# Patient Record
Sex: MALE | Race: WHITE | NOT HISPANIC OR LATINO | Employment: OTHER | URBAN - METROPOLITAN AREA
[De-identification: names, ages, dates, MRNs, and addresses within clinical notes are randomized per-mention and may not be internally consistent; named-entity substitution may affect disease eponyms.]

---

## 2017-10-18 ENCOUNTER — OFFICE VISIT (OUTPATIENT)
Dept: ORTHOPEDICS | Facility: CLINIC | Age: 67
End: 2017-10-18
Payer: COMMERCIAL

## 2017-10-18 ENCOUNTER — RADIANT APPOINTMENT (OUTPATIENT)
Dept: GENERAL RADIOLOGY | Facility: CLINIC | Age: 67
End: 2017-10-18
Attending: ORTHOPAEDIC SURGERY
Payer: COMMERCIAL

## 2017-10-18 VITALS — WEIGHT: 226.3 LBS | HEART RATE: 72 BPM | HEIGHT: 71 IN | BODY MASS INDEX: 31.68 KG/M2

## 2017-10-18 DIAGNOSIS — M25.511 CHRONIC RIGHT SHOULDER PAIN: Primary | ICD-10-CM

## 2017-10-18 DIAGNOSIS — M25.511 RIGHT SHOULDER PAIN: ICD-10-CM

## 2017-10-18 DIAGNOSIS — G89.29 CHRONIC RIGHT SHOULDER PAIN: Primary | ICD-10-CM

## 2017-10-18 PROCEDURE — 99203 OFFICE O/P NEW LOW 30 MIN: CPT | Performed by: ORTHOPAEDIC SURGERY

## 2017-10-18 PROCEDURE — 73030 X-RAY EXAM OF SHOULDER: CPT | Mod: TC

## 2017-10-18 NOTE — LETTER
10/18/2017         RE: Miguelangel Graham  [address] [facility] [facility] [address]        Dear Colleague,    Thank you for referring your patient, Miguelangel Graham, to the [facility]. Please see a copy of my visit note below.    ORTHOPEDIC CONSULT      Chief Complaint: Miguelangel Graham is a 67 year old male who is being seen for Chief Complaint   Patient presents with     Shoulder Pain     right shoulder pain-hx of rotorcuff repair 2000     Consult       History of Present Illness:   History of an open right shoulder rotator cuff repair in 2000.    Since April as had pain in his shoulder associated with raising his arm up. Occasionally will have pain radiating down the arm. He also has pain and spasm along the rhomboid area. No recent traumas or injury. Rates his pain as moderate. Describes it as achy. He has attempted Advil, rest, and activity modification.        Patient's past medical, surgical, social and family histories reviewed.     No past medical history on file.    Past Surgical History:   Procedure Laterality Date     HC KNEE SCOPE, DIAGNOSTIC      Arthroscopy, Knee     HC REPAIR ROTATOR CUFF,CHRONIC         Medications:    Current Outpatient Prescriptions on File Prior to Visit:  NO ACTIVE MEDICATIONS .     No current facility-administered medications on file prior to visit.     Allergies   Allergen Reactions     No Known Drug Allergies        Social History     Occupational History           Social History Main Topics     Smoking status: Former Smoker     Smokeless tobacco: Not on file     Alcohol use Yes     Drug use: No     Sexual activity: Not on file       Family History   Problem Relation Age of Onset     C.A.D. Brother      CAsherARADHA. Brother      CJERROD Brother      CAsherAROMI Brother      KELLY. Brother      DIABETES Sister      DIABETES Sister      DIABETES Brother      DIABETES Brother      DIABETES Brother        REVIEW OF SYSTEMS  10 point review systems performed otherwise  "negative as noted as per history of present illness.    Physical Exam:  Vitals: Pulse 72  Ht 5' 10.5\" (1.791 m)  Wt 226 lb 4.8 oz (102.6 kg)  BMI 32.01 kg/m2  BMI= Body mass index is 32.01 kg/(m^2).  Constitutional: healthy, alert and no acute distress   Psychiatric: mentation appears normal and affect normal/bright  NEURO: no focal deficits  RESP: Normal with easy respirations and no use of accessory muscles to breathe, no audible wheezing or retractions  CV: RUE:  no edema         Regular rate and rhythm by palpation  SKIN: No erythema, rashes, excoriation, or breakdown. No evidence of infection. , Previous well healed incisions/laceration: Anterior incision along the shoulder well-healed  JOINT/EXTREMITIES:right shoulder: Full range of motion. He has no focal areas of tenderness anteriorly. He has some tenderness and spasm along the rhomboid area. He has some minimal weakness with supraspinatus and infraspinatus testing. Negative [place]'s. Negative impingement findings.     GAIT: not tested     Diagnostic Modalities:  right shoulder X-ray: No fractures or dislocations. Type II acromion. Appears to have some postsurgical changes along the a.c. joint and proximal humeral head.  Independent visualization of the images was performed.      Impression: right shoulder pain and weakness with previous rotator cuff repair in 2000    Plan:  All of the above pertinent physical exam and imaging modalities findings was reviewed with Miguelangel.    Given his exam and previous history of recommend MRI for evaluation of rotator cuff tendon.      Return to clinic to discuss test results, or sooner as needed for changes.  Re-x-ray on return: No    Bhupendra Jones D.O.    Again, thank you for allowing me to participate in the care of your patient.        Sincerely,        Jose Jones, DO    "

## 2017-10-18 NOTE — MR AVS SNAPSHOT
"              After Visit Summary   10/18/2017    Miguelangel Graham    MRN: [id number]           Patient Information     Date Of Birth          1950        Visit Information        Provider Department      10/18/2017 2:10 PM Jose Jones DO [facility]        Today's Diagnoses     Right shoulder pain    -  1       Follow-ups after your visit        Your next 10 appointments already scheduled     Oct 18, 2017  2:10 PM CDT   [facility]    [address] [place] [id number] [phone]              Who to contact     If you have questions or need follow up information about today's clinic visit or your schedule please contact [facility] directly at [phone].  Normal or non-critical lab and imaging results will be communicated to you by MyChart, letter or phone within 4 business days after the clinic has received the results. If you do not hear from us within 7 days, please contact the clinic through Lovejuicehart or phone. If you have a critical or abnormal lab result, we will notify you by phone as soon as possible.  Submit refill requests through Spotbros or call your pharmacy and they will forward the refill request to us. Please allow 3 business days for your refill to be completed.          Additional Information About Your Visit        Lovejuicehart Information     Spotbros lets you send messages to your doctor, view your test results, renew your prescriptions, schedule appointments and more. To sign up, go to [email or web address] . Click on \"Log in\" on the left side of the screen, which will take you to the Welcome page. Then click on \"Sign up Now\" on the right side of the page.     You will be asked to enter the access code listed below, as well as some personal information. Please follow the directions to create your username and password.     Your access code is: " "[email or web address]  Expires: 2018  1:44 PM     Your access code will  in 90 days. If you need help or a new code, please call your [facility] clinic or [phone].        Care EveryWhere ID     This is your Care EveryWhere ID. This could be used by other organizations to access your [facility] medical records  [id number]        Your Vitals Were     Pulse Height BMI (Body Mass Index)             72 1.791 m (5' 10.5\") 32.01 kg/m2          Blood Pressure from Last 3 Encounters:   13 122/78   04/30/10 122/78   09 122/78    Weight from Last 3 Encounters:   10/18/17 102.6 kg (226 lb 4.8 oz)   13 105.7 kg (233 lb)   04/30/10 105.7 kg (233 lb)               Primary Care Provider Office Phone # Fax #    John Doshi [phone]       XXX RESIGNED  [facility] [address] [place] [address]        Equal Access to Services     [facility] HospitalSTACEY : Hadii concha saenz hadadalberto Somaddie, waaxda luqadaha, qaybta kaalmaximo deal, sherwin varghese. So [facility] [phone].    ATENCIÓN: Si habla español, tiene a alicea disposición servicios gratuitos de asistencia lingüística. [facility] [phone].    We comply with applicable federal civil rights laws and [place] laws. We do not discriminate on the basis of race, color, national origin, age, disability, sex, sexual orientation, or gender identity.            Thank you!     Thank you for choosing [facility]  for your care. Our goal is always to provide you with excellent care. Hearing back from our patients is one way we can continue to improve our services. Please take a few minutes to complete the written survey that you may receive in the mail after your visit with us. Thank you!             Your Updated Medication List - Protect others around you: Learn how to safely use, store and throw away your medicines at www.disposemymeds.org.          This list is accurate as of: 10/18/17  1:58 PM.  Always use " your most recent med list.                   Brand Name Dispense Instructions for use Diagnosis    NO ACTIVE MEDICATIONS      .    Disorders of bursae and tendons in shoulder region, unspecified

## 2017-10-18 NOTE — PROGRESS NOTES
"ORTHOPEDIC CONSULT      Chief Complaint: Miguelangel Graham is a 67 year old male who is being seen for Chief Complaint   Patient presents with     Shoulder Pain     right shoulder pain-hx of rotorcuff repair 2000     Consult       History of Present Illness:   History of an open right shoulder rotator cuff repair in 2000.    Since April as had pain in his shoulder associated with raising his arm up. Occasionally will have pain radiating down the arm. He also has pain and spasm along the rhomboid area. No recent traumas or injury. Rates his pain as moderate. Describes it as achy. He has attempted Advil, rest, and activity modification.        Patient's past medical, surgical, social and family histories reviewed.     No past medical history on file.    Past Surgical History:   Procedure Laterality Date     HC KNEE SCOPE, DIAGNOSTIC      Arthroscopy, Knee     HC REPAIR ROTATOR CUFF,CHRONIC         Medications:    Current Outpatient Prescriptions on File Prior to Visit:  NO ACTIVE MEDICATIONS .     No current facility-administered medications on file prior to visit.     Allergies   Allergen Reactions     No Known Drug Allergies        Social History     Occupational History           Social History Main Topics     Smoking status: Former Smoker     Smokeless tobacco: Not on file     Alcohol use Yes     Drug use: No     Sexual activity: Not on file       Family History   Problem Relation Age of Onset     C.A.D. Brother      C.A.D. Brother      C.A.D. Brother      C.A.D. Brother      C.A.D. Brother      DIABETES Sister      DIABETES Sister      DIABETES Brother      DIABETES Brother      DIABETES Brother        REVIEW OF SYSTEMS  10 point review systems performed otherwise negative as noted as per history of present illness.    Physical Exam:  Vitals: Pulse 72  Ht 5' 10.5\" (1.791 m)  Wt 226 lb 4.8 oz (102.6 kg)  BMI 32.01 kg/m2  BMI= Body mass index is 32.01 kg/(m^2).  Constitutional: healthy, alert and no " acute distress   Psychiatric: mentation appears normal and affect normal/bright  NEURO: no focal deficits  RESP: Normal with easy respirations and no use of accessory muscles to breathe, no audible wheezing or retractions  CV: RUE:  no edema         Regular rate and rhythm by palpation  SKIN: No erythema, rashes, excoriation, or breakdown. No evidence of infection. , Previous well healed incisions/laceration: Anterior incision along the shoulder well-healed  JOINT/EXTREMITIES:right shoulder: Full range of motion. He has no focal areas of tenderness anteriorly. He has some tenderness and spasm along the rhomboid area. He has some minimal weakness with supraspinatus and infraspinatus testing. Negative [place]'s. Negative impingement findings.     GAIT: not tested     Diagnostic Modalities:  right shoulder X-ray: No fractures or dislocations. Type II acromion. Appears to have some postsurgical changes along the a.c. joint and proximal humeral head.  Independent visualization of the images was performed.      Impression: right shoulder pain and weakness with previous rotator cuff repair in 2000    Plan:  All of the above pertinent physical exam and imaging modalities findings was reviewed with Miguelangel.    Given his exam and previous history of recommend MRI for evaluation of rotator cuff tendon.      Return to clinic to discuss test results, or sooner as needed for changes.  Re-x-ray on return: No    Bhupendra Jones D.O.

## 2017-10-18 NOTE — NURSING NOTE
"Chief Complaint   Patient presents with     Shoulder Pain     right shoulder pain-hx of rotorcuff repair 2000     Consult       Initial Pulse 72  Ht 1.791 m (5' 10.5\")  Wt 102.6 kg (226 lb 4.8 oz)  BMI 32.01 kg/m2 Estimated body mass index is 32.01 kg/(m^2) as calculated from the following:    Height as of this encounter: 1.791 m (5' 10.5\").    Weight as of this encounter: 102.6 kg (226 lb 4.8 oz).  Medication Reconciliation: complete    "

## 2017-10-21 ENCOUNTER — HOSPITAL ENCOUNTER (OUTPATIENT)
Dept: MRI IMAGING | Facility: CLINIC | Age: 67
Discharge: HOME | End: 2017-10-21
Attending: ORTHOPAEDIC SURGERY | Admitting: ORTHOPAEDIC SURGERY
Payer: MEDICARE

## 2017-10-21 DIAGNOSIS — G89.29 CHRONIC RIGHT SHOULDER PAIN: ICD-10-CM

## 2017-10-21 DIAGNOSIS — M25.511 CHRONIC RIGHT SHOULDER PAIN: ICD-10-CM

## 2017-10-21 PROCEDURE — 73221 MRI JOINT UPR EXTREM W/O DYE: CPT | Mod: RT

## 2017-10-25 ENCOUNTER — TELEPHONE (OUTPATIENT)
Dept: ORTHOPEDICS | Facility: CLINIC | Age: 67
End: 2017-10-25

## 2017-10-25 ENCOUNTER — OFFICE VISIT (OUTPATIENT)
Dept: ORTHOPEDICS | Facility: CLINIC | Age: 67
End: 2017-10-25
Payer: COMMERCIAL

## 2017-10-25 VITALS — TEMPERATURE: 97.5 F | HEIGHT: 71 IN | WEIGHT: 226 LBS | BODY MASS INDEX: 31.64 KG/M2

## 2017-10-25 DIAGNOSIS — M75.21 BICEPS TENDONITIS, RIGHT: ICD-10-CM

## 2017-10-25 DIAGNOSIS — M75.121 COMPLETE TEAR OF RIGHT ROTATOR CUFF: Primary | ICD-10-CM

## 2017-10-25 DIAGNOSIS — M19.019 AC JOINT ARTHROPATHY: ICD-10-CM

## 2017-10-25 PROCEDURE — 99213 OFFICE O/P EST LOW 20 MIN: CPT | Performed by: ORTHOPAEDIC SURGERY

## 2017-10-25 ASSESSMENT — PAIN SCALES - GENERAL: PAINLEVEL: NO PAIN (1)

## 2017-10-25 NOTE — NURSING NOTE
"Chief Complaint   Patient presents with     RECHECK     MRI results of right shoulder       Initial Temp 97.5  F (36.4  C) (Temporal)  Ht 1.791 m (5' 10.5\")  Wt 102.5 kg (226 lb)  BMI 31.97 kg/m2 Estimated body mass index is 31.97 kg/(m^2) as calculated from the following:    Height as of this encounter: 1.791 m (5' 10.5\").    Weight as of this encounter: 102.5 kg (226 lb).  Medication Reconciliation: complete   Marah/ROHINI     "

## 2017-10-25 NOTE — TELEPHONE ENCOUNTER
Surgery Scheduled   Date of Surgery 12/12/17 Time of Surgery    Procedure: Rt Shoulder RTC Repair    Hospital/Surgical Facility: [facility]   Surgeon: Dr. Abdi   Type of Anesthesia : General   Pre-op 11/27/17 with Dr. Horowitz   2 week post op:12/21/17 with Dr. Abdi   6 week post op: with      Surgery packet mailed to patient's home address. Patients instructed to arrive  hours prior to surgery. Patient understood and agrees to plan.  Nicol Moe

## 2017-10-25 NOTE — PROGRESS NOTES
"HISTORY OF PRESENT ILLNESS:    Miguelangel Graham is a 67 year old male who is seen in follow up for   Chief Complaint   Patient presents with     RECHECK     MRI results of right shoulder   Present symptoms: Patient reports a very persistent and constant ache of the deltoid and posterior shoulder. Patient is also describing adduction as well as difficulty reaching behind his back is difficult.  Patient had previous rotator cuff repair by open procedure approximately 10 years ago  Treatments tried to this point: Previous open rotator cuff repair  Patient evaluation done with Dr. Jose Jones DO    October 18, 2017 visit:  History of an open right shoulder rotator cuff repair in 2000.     Since April as had pain in his shoulder associated with raising his arm up. Occasionally will have pain radiating down the arm. He also has pain and spasm along the rhomboid area. No recent traumas or injury. Rates his pain as moderate. Describes it as achy. He has attempted Advil, rest, and activity modification.    Physical Exam:  Vitals: Temp 97.5  F (36.4  C) (Temporal)  Ht 1.791 m (5' 10.5\")  Wt 102.5 kg (226 lb)  BMI 31.97 kg/m2  BMI= Body mass index is 31.97 kg/(m^2).  Constitutional: healthy, alert and no acute distress   Psychiatric: mentation appears normal and affect normal/bright  NEURO: no focal deficits  SKIN: no excoriation or erythema. No signs of infection.  JOINT/EXTREMITIES:  Affected extremity pulses are easily palpable.  SHOULDER Exam-Right   Inspection: No obvious asymmetry    Tenderness of: AC Joint    Range of Motion: Active-patient demonstrates good range of motion in forward flexion as well as abduction to full 180 . Patient does have difficulty with adduction as well as internal rotation behind the back maneuver.  External rotation also with some mild deficit    Strength: Patient with discomfort in speed's maneuver as well as with resistance in abduction . Positive [place]'s.      IMAGING INTERPRETATION:  MRI " images are available. MRI does include a full-thickness tear at the insertion of the rotator cuff to the humeral head. There does not appear to be any retraction. Patient also with a very prominent and bulbous appearing a.c. joint. Also seen is some fraying /splitting of the biceps tendon.  Independent visualization of the images was performed. Points of the MRI were shared with the patient     ASSESSMENT:  Chief Complaint   Patient presents with     RECHECK     MRI results of right shoulder       ICD-10-CM    1. Complete tear of right rotator cuff M75.121    2. Biceps tendonitis, right M75.21    3. 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 Patient also with biceps tendinitis and a.c. joint symptoms.    Plan:   The above was explained to the patient as well as the surgical risks as well as recovery.  Due to the extent of the tear plan is for shoulder arthroscopy. This was explained to the patient as he had previous open procedure.    I discussed surgery would include a right shoulder arthroscopy with arthroscopic rotator cuff repair, biceps tenodesis, and distal clavicle excision with subacromial decompression.     The risks, benefits,  complication, limitations, and expectations were reviewed at length. Patient is familiar with the recovery process due to previous rotator cuff repair. Complication such as infection, bleeding, tendon re-tears was reviewed. Surgery to be done under general anesthesia, risks of aspiration, strokes, heart attacks,  and deaths was reviewed.     With any repair of a torn rotator cuff limitations would be in place post-operatively. This generally includes use of a sling and passive pendulum exercises in the first month, passive range of motion in the second month, active range of motion in the third month and finally strengthening in the fourth month.  There would be restrictions for overhead activity as well as weight  restrictions.. Return to full unrestricted activity would be about approximately 20 weeks after surgery. These are a general time reference and may be changed depending on the pathology and fixation.     All questions were answered.Together through a combined decision making approach we have decided on a shoulder arthroscopy and the above listed procedures.    The patient's past medical and surgical history was reviewed; The patient will need a preoperative medical evaluation and clearance.    Return to clinic postoperatively 10-14 days    Scribed by  Danna Mckee PA-C   10/25/2017  9:38 AM      I attest I have seen and evaluated the patient.  I agree with above impression and plan.    Jose Jones DO    Please schedule for surgery, pre op H&P, and post ops.      Patient Name:  Miguelangel Graham ([id number]).  :  1950  Gender:  male  Patient Type:  Same Day Surgery  Surgeon:  Jose Jones DO  Physician requests assist from:  PA    Procedures:    Right shoulder arthroscopy with rotator cuff repair, biceps tenodesis, distal clavicle excision and subacromial decompression with partial chondroplasty  Diagnosis:     Complete tear of right rotator cuff  Biceps tendonitis, right  AC joint arthropathy  Special instruments/supplies:  [employer]  Vendor Rep:  yes  Anesthesia:  General  Block:  Yes - Given by  CRNA  Block type: Shoulder  Time needed:  90 minutes    [facility] Discussed:  Not Applicable    Post op 1:

## 2017-10-25 NOTE — LETTER
"    10/25/2017         RE: Miguelangel Graham  [address] [facility] [address]        Dear Colleague,    Thank you for referring your patient, Miguelangel Graham, to the [facility]. Please see a copy of my visit note below.    HISTORY OF PRESENT ILLNESS:    Miguelangel Graham is a 67 year old male who is seen in follow up for   Chief Complaint   Patient presents with     RECHECK     MRI results of right shoulder   Present symptoms: Patient reports a very persistent and constant ache of the deltoid and posterior shoulder. Patient is also describing adduction as well as difficulty reaching behind his back is difficult.  Patient had previous rotator cuff repair by open procedure approximately 10 years ago  Treatments tried to this point: Previous open rotator cuff repair  Patient evaluation done with Dr. Jose Jones DO    October 18, 2017 visit:  History of an open right shoulder rotator cuff repair in 2000.     Since April as had pain in his shoulder associated with raising his arm up. Occasionally will have pain radiating down the arm. He also has pain and spasm along the rhomboid area. No recent traumas or injury. Rates his pain as moderate. Describes it as achy. He has attempted Advil, rest, and activity modification.    Physical Exam:  Vitals: Temp 97.5  F (36.4  C) (Temporal)  Ht 1.791 m (5' 10.5\")  Wt 102.5 kg (226 lb)  BMI 31.97 kg/m2  BMI= Body mass index is 31.97 kg/(m^2).  Constitutional: healthy, alert and no acute distress   Psychiatric: mentation appears normal and affect normal/bright  NEURO: no focal deficits  SKIN: no excoriation or erythema. No signs of infection.  JOINT/EXTREMITIES:  Affected extremity pulses are easily palpable.  SHOULDER Exam-Right   Inspection: No obvious asymmetry    Tenderness of: AC Joint    Range of Motion: Active-patient demonstrates good range of motion in forward flexion as well as abduction to full 180 . Patient does have difficulty with adduction as well as " internal rotation behind the back maneuver.  External rotation also with some mild deficit    Strength: Patient with discomfort in speed's maneuver as well as with resistance in abduction . Positive [place]'s.      IMAGING INTERPRETATION:  MRI images are available. MRI does include a full-thickness tear at the insertion of the rotator cuff to the humeral head. There does not appear to be any retraction. Patient also with a very prominent and bulbous appearing a.c. joint. Also seen is some fraying /splitting of the biceps tendon.  Independent visualization of the images was performed. Points of the MRI were shared with the patient     ASSESSMENT:  Chief Complaint   Patient presents with     RECHECK     MRI results of right shoulder       ICD-10-CM    1. Complete tear of right rotator cuff M75.121    2. Biceps tendonitis, right M75.21    3. 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 Patient also with biceps tendinitis and a.c. joint symptoms.    Plan:   The above was explained to the patient as well as the surgical risks as well as recovery.  Due to the extent of the tear plan is for shoulder arthroscopy. This was explained to the patient as he had previous open procedure.    I discussed surgery would include a right shoulder arthroscopy with arthroscopic rotator cuff repair, biceps tenodesis, and distal clavicle excision with subacromial decompression.     The risks, benefits,  complication, limitations, and expectations were reviewed at length. Patient is familiar with the recovery process due to previous rotator cuff repair. Complication such as infection, bleeding, tendon re-tears was reviewed. Surgery to be done under general anesthesia, risks of aspiration, strokes, heart attacks,  and deaths was reviewed.     With any repair of a torn rotator cuff limitations would be in place post-operatively. This generally includes use of a sling and passive  pendulum exercises in the first month, passive range of motion in the second month, active range of motion in the third month and finally strengthening in the fourth month.  There would be restrictions for overhead activity as well as weight restrictions.. Return to full unrestricted activity would be about approximately 20 weeks after surgery. These are a general time reference and may be changed depending on the pathology and fixation.     All questions were answered.Together through a combined decision making approach we have decided on a shoulder arthroscopy and the above listed procedures.    The patient's past medical and surgical history was reviewed; The patient will need a preoperative medical evaluation and clearance.    Return to clinic postoperatively 10-14 days    Scribed by  Danna Mckee PA-C   10/25/2017  9:38 AM      I attest I have seen and evaluated the patient.  I agree with above impression and plan.    Jose Jones DO    Please schedule for surgery, pre op H&P, and post ops.      Patient Name:  Miguelangel Graham ([id number]).  :  1950  Gender:  male  Patient Type:  Same Day Surgery  Surgeon:  Jose Jones DO  Physician requests assist from:  PA    Procedures:    Right shoulder arthroscopy with rotator cuff repair, biceps tenodesis, distal clavicle excision and subacromial decompression with partial chondroplasty  Diagnosis:     Complete tear of right rotator cuff  Biceps tendonitis, right  AC joint arthropathy  Special instruments/supplies:  [employer]  Vendor Rep:  yes  Anesthesia:  General  Block:  Yes - Given by  CRNA  Block type: Shoulder  Time needed:  90 minutes    [facility] Discussed:  Not Applicable    Post op 1:              Again, thank you for allowing me to participate in the care of your patient.        Sincerely,        Jose Jones DO

## 2017-10-25 NOTE — MR AVS SNAPSHOT
"              After Visit Summary   10/25/2017    Miguelangel Graham    MRN: [id number]           Patient Information     Date Of Birth          1950        Visit Information        Provider Department      10/25/2017 9:20 AM Jose Jones DO [facility]         Follow-ups after your visit        Your next 10 appointments already scheduled     Oct 25, 2017  9:20 AM CDT   Return Visit with Jose Jones DO   [facility] ([facility]    [address] [place] [id number] [phone]              Who to contact     If you have questions or need follow up information about today's clinic visit or your schedule please contact [facility] directly at [phone].  Normal or non-critical lab and imaging results will be communicated to you by MyChart, letter or phone within 4 business days after the clinic has received the results. If you do not hear from us within 7 days, please contact the clinic through MyChart or phone. If you have a critical or abnormal lab result, we will notify you by phone as soon as possible.  Submit refill requests through Viewdle or call your pharmacy and they will forward the refill request to us. Please allow 3 business days for your refill to be completed.          Additional Information About Your Visit        Digit Game Studioshart Information     Viewdle lets you send messages to your doctor, view your test results, renew your prescriptions, schedule appointments and more. To sign up, go to [email or web address] . Click on \"Log in\" on the left side of the screen, which will take you to the Welcome page. Then click on \"Sign up Now\" on the right side of the page.     You will be asked to enter the access code listed below, as well as some personal information. Please follow the directions to create your username and password.     Your access code is: [email or web address]  Expires: 1/16/2018  1:44 PM     Your access " "code will  in 90 days. If you need help or a new code, please call your [facility] clinic or [phone].        Care EveryWhere ID     This is your Care EveryWhere ID. This could be used by other organizations to access your [facility] medical records  [id number]        Your Vitals Were     Temperature Height BMI (Body Mass Index)             97.5  F (36.4  C) (Temporal) 1.791 m (5' 10.5\") 31.97 kg/m2          Blood Pressure from Last 3 Encounters:   13 122/78   04/30/10 122/78   09 122/78    Weight from Last 3 Encounters:   10/25/17 102.5 kg (226 lb)   10/18/17 102.6 kg (226 lb 4.8 oz)   13 105.7 kg (233 lb)              Today, you had the following     No orders found for display       Primary Care Provider Office Phone # Fax #    John Doshi [phone]       XXX RESIGNED  [facility] [address] [place] [address]        Equal Access to Services     [facility]: Hadii concha ortiz Somaddie, waaxda lukeri, qaybta karodney deal, sherwin yoder . So [facility] [phone].    ATENCIÓN: Si habla español, tiene a alicea disposición servicios gratuitos de asistencia lingüística. [facility] [phone].    We comply with applicable federal civil rights laws and [place] laws. We do not discriminate on the basis of race, color, national origin, age, disability, sex, sexual orientation, or gender identity.            Thank you!     Thank you for choosing [facility]  for your care. Our goal is always to provide you with excellent care. Hearing back from our patients is one way we can continue to improve our services. Please take a few minutes to complete the written survey that you may receive in the mail after your visit with us. Thank you!             Your Updated Medication List - Protect others around you: Learn how to safely use, store and throw away your medicines at www.disposemymeds.org.          This list is accurate " as of: 10/25/17  9:16 AM.  Always use your most recent med list.                   Brand Name Dispense Instructions for use Diagnosis    NO ACTIVE MEDICATIONS      .    Disorders of bursae and tendons in shoulder region, unspecified

## 2017-11-14 ENCOUNTER — ALLIED HEALTH/NURSE VISIT (OUTPATIENT)
Dept: FAMILY MEDICINE | Facility: CLINIC | Age: 67
End: 2017-11-14
Payer: COMMERCIAL

## 2017-11-14 DIAGNOSIS — Z23 NEED FOR PROPHYLACTIC VACCINATION AND INOCULATION AGAINST INFLUENZA: Primary | ICD-10-CM

## 2017-11-14 PROCEDURE — 99207 ZZC NO CHARGE NURSE ONLY: CPT

## 2017-11-14 PROCEDURE — G0008 ADMIN INFLUENZA VIRUS VAC: HCPCS

## 2017-11-14 PROCEDURE — 90662 IIV NO PRSV INCREASED AG IM: CPT

## 2017-11-14 NOTE — MR AVS SNAPSHOT
After Visit Summary   11/14/2017    Miguelangel Graham    MRN: [id number]           Patient Information     Date Of Birth          1950        Visit Information        Provider Department      11/14/2017 4:30 PM NL FLOAT TEAM D [facility]        Today's Diagnoses     Need for prophylactic vaccination and inoculation against influenza    -  1       Follow-ups after your visit        Your next 10 appointments already scheduled     Nov 14, 2017  4:30 PM CST   Nurse Only with NL FLOAT TEAM D [facility] ([facility])    [address] [place] [id number] [phone]            Dec 01, 2017  8:00 AM CST   Pre-Op physical with Gregory Horowitz MD   [facility] ([facility])    [address] [place] [id number] [phone]            Dec 12, 2017   Procedure with Jose Jones DO   [facility] ([facility])    [address] [place] [id number] [phone]           From [address]: Exit at [address] on south side of [place]. Turn right on [address]. Turn left at stoplight on [address]. [facility] will be in view two blocks ahead            Dec 21, 2017  9:00 AM CST   Return Visit with Jose Jones DO   [facility] ([facility])    [address] [place] [id number] [phone]              Who to contact     If you have questions or need follow up information about today's clinic visit or your schedule please contact [facility] directly at [phone].  Normal or non-critical lab and imaging results will be communicated to you by MyChart, letter or phone within 4 business days after the clinic has received the results. If you do not hear from us within 7 days, please contact the clinic through MyChart or phone. If you have a critical or  "abnormal lab result, we will notify you by phone as soon as possible.  Submit refill requests through Hotswap or call your pharmacy and they will forward the refill request to us. Please allow 3 business days for your refill to be completed.          Additional Information About Your Visit        Carnivalhart Information     Hotswap lets you send messages to your doctor, view your test results, renew your prescriptions, schedule appointments and more. To sign up, go to [email or web address] . Click on \"Log in\" on the left side of the screen, which will take you to the Welcome page. Then click on \"Sign up Now\" on the right side of the page.     You will be asked to enter the access code listed below, as well as some personal information. Please follow the directions to create your username and password.     Your access code is: [email or web address]  Expires: 2018 12:44 PM     Your access code will  in 90 days. If you need help or a new code, please call your [facility] clinic or [phone].        Care EveryWhere ID     This is your Care EveryWhere ID. This could be used by other organizations to access your [facility] medical records  [id number]         Blood Pressure from Last 3 Encounters:   13 122/78   04/30/10 122/78   09 122/78    Weight from Last 3 Encounters:   10/25/17 226 lb (102.5 kg)   10/18/17 226 lb 4.8 oz (102.6 kg)   13 233 lb (105.7 kg)              We Performed the Following     FLU VACCINE, INCREASED ANTIGEN, PRESV FREE, AGE 65+ [26927]     Vaccine Administration, Initial [93587]        Primary Care Provider Office Phone # Fax #    John Doshi [phone]       XXX RESIGNED  [facility] [address] [place] [address]        Equal Access to Services     [facility] : Dom Courtney, warosalia luqlaura, qaybta kaalmaximo deal, sherwin yoder . [facility] [phone].    ATENCIÓN: Si shelly rodriguez " disposición servicios gratuitos de asistencia lingüística. Sarath dominguez [phone].    We comply with applicable federal civil rights laws and [place] laws. We do not discriminate on the basis of race, color, national origin, age, disability, sex, sexual orientation, or gender identity.            Thank you!     Thank you for choosing [facility]  for your care. Our goal is always to provide you with excellent care. Hearing back from our patients is one way we can continue to improve our services. Please take a few minutes to complete the written survey that you may receive in the mail after your visit with us. Thank you!             Your Updated Medication List - Protect others around you: Learn how to safely use, store and throw away your medicines at www.disposemymeds.org.          This list is accurate as of: 11/14/17  3:47 PM.  Always use your most recent med list.                   Brand Name Dispense Instructions for use Diagnosis    NO ACTIVE MEDICATIONS      .    Disorders of bursae and tendons in shoulder region, unspecified

## 2017-11-14 NOTE — PROGRESS NOTES

## 2017-12-01 ENCOUNTER — OFFICE VISIT (OUTPATIENT)
Dept: FAMILY MEDICINE | Facility: CLINIC | Age: 67
End: 2017-12-01
Payer: COMMERCIAL

## 2017-12-01 VITALS
HEART RATE: 70 BPM | OXYGEN SATURATION: 96 % | HEIGHT: 71 IN | TEMPERATURE: 97.8 F | WEIGHT: 228 LBS | SYSTOLIC BLOOD PRESSURE: 134 MMHG | DIASTOLIC BLOOD PRESSURE: 80 MMHG | BODY MASS INDEX: 31.92 KG/M2

## 2017-12-01 DIAGNOSIS — M75.121 COMPLETE TEAR OF RIGHT ROTATOR CUFF: ICD-10-CM

## 2017-12-01 DIAGNOSIS — Z01.818 PREOP GENERAL PHYSICAL EXAM: Primary | ICD-10-CM

## 2017-12-01 PROCEDURE — 93000 ELECTROCARDIOGRAM COMPLETE: CPT | Performed by: FAMILY MEDICINE

## 2017-12-01 PROCEDURE — 99214 OFFICE O/P EST MOD 30 MIN: CPT | Performed by: FAMILY MEDICINE

## 2017-12-01 NOTE — PROGRESS NOTES
[address] [address]  [phone]  Dept: [phone]    PRE-OP EVALUATION:  Today's date: 2017    Miguelangel Graham (: 1950) presents for pre-operative evaluation assessment as requested by Dr. Jones   He requires evaluation and anesthesia risk assessment prior to undergoing surgery/procedure for treatment of Right shoulder pain .  Proposed procedure: Right shoulder arthroscopy: Rotator cuff repair, biceps tenodesis, clavicle resection and shoulder decompression.    Date of Surgery/ Procedure: 17  Time of Surgery/ Procedure: 1230  Hospital/Surgical Facility: [facility]  Fax number for surgical facility:   Primary Physician: No Ref-Primary, Physician  Type of Anesthesia Anticipated: Combined general with block    Patient has a Health Care Directive or Living Will:  NO    Preop Questions 2017   1.  Do you have a history of heart attack, stroke, stent, bypass or surgery on an artery in the head, neck, heart or legs? No   2.  Do you ever have any pain or discomfort in your chest? No   3.  Do you have a history of  Heart Failure? No   4.   Are you troubled by shortness of breath when:  walking on a level surface, or up a slight hill, or at night? No   5.  Do you currently have a cold, bronchitis or other respiratory infection? No   6.  Do you have a cough, shortness of breath, or wheezing? No   7.  Do you sometimes get pains in the calves of your legs when you walk? No   8. Do you or anyone in your family have previous history of blood clots? No   9.  Do you or does anyone in your family have a serious bleeding problem such as prolonged bleeding following surgeries or cuts? No   10. Have you ever had problems with anemia or been told to take iron pills? No   11. Have you had any abnormal blood loss such as black, tarry or bloody stools? No   12. Have you ever had a blood transfusion? No   13. Have you or any of your relatives ever had problems with  anesthesia? No   14. Do you have sleep apnea, excessive snoring or daytime drowsiness? YES - wife says he snores too much    15. Do you have any prosthetic heart valves? No   16. Do you have prosthetic joints? No           HPI:                                                      Brief HPI related to upcoming procedure: Long-term problems with his right shoulder has had previous surgery for rotator cuff repair now it has become painful again in decreased usage. MRI indicates acute changes          MEDICAL HISTORY:                                                    Patient Active Problem List    Diagnosis Date Noted     AC joint arthropathy 10/25/2017     Priority: Medium     Biceps tendonitis, right 10/25/2017     Priority: Medium     Complete tear of right rotator cuff 10/25/2017     Priority: Medium     CARDIOVASCULAR SCREENING; LDL GOAL LESS THAN 160 10/31/2010     Priority: Medium      No past medical history on file.  Past Surgical History:   Procedure Laterality Date     HC KNEE SCOPE, DIAGNOSTIC      Arthroscopy, Knee     HC REPAIR ROTATOR CUFF,CHRONIC       Current Outpatient Prescriptions   Medication Sig Dispense Refill     NO ACTIVE MEDICATIONS .       OTC products: None, except as noted above    Allergies   Allergen Reactions     No Known Drug Allergies       Latex Allergy: NO    Social History   Substance Use Topics     Smoking status: Former Smoker     Smokeless tobacco: Never Used     Alcohol use Yes     History   Drug Use No       REVIEW OF SYSTEMS:                                                    Constitutional, neuro, ENT, endocrine, pulmonary, cardiac, gastrointestinal, genitourinary, musculoskeletal, integument and psychiatric systems are negative, except as otherwise noted.      EXAM:                                                    There were no vitals taken for this visit.    GENERAL APPEARANCE: healthy, alert and no distress     EYES: EOMI,  PERRL     HENT: ear canals and TM's normal and  nose and mouth without ulcers or lesions     NECK: no adenopathy, no asymmetry, masses, or scars and thyroid normal to palpation     RESP: lungs clear to auscultation - no rales, rhonchi or wheezes     CV: regular rates and rhythm, normal S1 S2, no S3 or S4 and no murmur, click or rub     ABDOMEN:  soft, nontender, no HSM or masses and bowel sounds normal     MS: extremities normal- no gross deformities noted, no evidence of inflammation in joints, FROM in all extremities.     SKIN: no suspicious lesions or rashes     NEURO: Normal strength and tone, sensory exam grossly normal, mentation intact and speech normal     PSYCH: mentation appears normal. and affect normal/bright     LYMPHATICS: No axillary, cervical, or supraclavicular nodes    DIAGNOSTICS:                                                    EKG: sinus bradycardia, normal axis, normal intervals, no acute ST/T changes c/w ischemia, no LVH by voltage criteria    Recent Labs   Lab Test  05/01/13   0803   NA  140   POTASSIUM  4.1   CR  0.86        IMPRESSION:                                                    Reason for surgery/procedure: Right rotator cuff tear     The proposed surgical procedure is considered INTERMEDIATE risk.    REVISED CARDIAC RISK INDEX  The patient has the following serious cardiovascular risks for perioperative complications such as (MI, PE, VFib and 3  AV Block):  No serious cardiac risks  INTERPRETATION: 0 risks: Class I (very low risk - 0.4% complication rate)    The patient has the following additional risks for perioperative complications:  No identified additional risks      ICD-10-CM    1. Preop general physical exam Z01.818        RECOMMENDATIONS:                                                              APPROVAL GIVEN to proceed with proposed procedure, without further diagnostic evaluation       Signed Electronically by: Gregory Horowitz MD    Copy of this evaluation report is provided to requesting  physician.    [facility] Preop Guidelines

## 2017-12-01 NOTE — NURSING NOTE
"Chief Complaint   Patient presents with     Pre-Op Exam     Arthoscopy of right shoulder: Rotator cuff repair, Bicep tenodesis, clavicle resection and shoulder decompression       Initial /80 (BP Location: Right arm, Patient Position: Chair, Cuff Size: Adult Large)  Pulse 70  Temp 97.8  F (36.6  C) (Temporal)  Ht 5' 10.5\" (1.791 m)  Wt 228 lb (103.4 kg)  SpO2 96%  BMI 32.25 kg/m2 Estimated body mass index is 32.25 kg/(m^2) as calculated from the following:    Height as of this encounter: 5' 10.5\" (1.791 m).    Weight as of this encounter: 228 lb (103.4 kg).  Medication Reconciliation: complete    "

## 2017-12-01 NOTE — MR AVS SNAPSHOT
After Visit Summary   12/1/2017    Miguelangel Graham    MRN: [id number]           Patient Information     Date Of Birth          1950        Visit Information        Provider Department      12/1/2017 8:00 AM Gregory Horowitz MD [facility]        Today's Diagnoses     Preop general physical exam    -  1    Complete tear of right rotator cuff          Care Instructions      Before Your Surgery      Call your surgeon if there is any change in your health. This includes signs of a cold or flu (such as a sore throat, runny nose, cough, rash or fever).    Do not smoke, drink alcohol or take over the counter medicine (unless your surgeon or primary care doctor tells you to) for the 24 hours before and after surgery.    If you take prescribed drugs: Follow your doctor s orders about which medicines to take and which to stop until after surgery.    Eating and drinking prior to surgery: follow the instructions from your surgeon    Take a shower or bath the night before surgery. Use the soap your surgeon gave you to gently clean your skin. If you do not have soap from your surgeon, use your regular soap. Do not shave or scrub the surgery site.  Wear clean pajamas and have clean sheets on your bed.   Before Your Surgery    Call your surgeon if there is any change in your health. This includes signs of a cold or flu (such as a sore throat, runny nose, cough, rash or fever).  Do not smoke, drink alcohol or take over the counter medicine (unless your surgeon or primary care doctor tells you to) for the 24 hours before and after surgery.  If you take prescribed drugs: Follow your doctor s orders about which medicines to take and which to stop until after surgery.  Eating and drinking prior to surgery: follow the instructions from your surgeon  Take a shower or bath the night before surgery. Use the soap your surgeon gave you to gently clean your skin. If you do not have soap from your surgeon, use  "your regular soap. Do not shave or scrub the surgery site.  Wear clean pajamas and have clean sheets on your bed.           Follow-ups after your visit        Your next 10 appointments already scheduled     Dec 12, 2017   Procedure with DO Nagi Ribeiro [facility] ([facility])    [address] [facility] [address] [place] [id number] [phone]           From [address]: Exit at [address] on south side of [place]. Turn right on [address]. Turn left at stoplight on [address]. [facility] will be in view two blocks ahead            Dec 21, 2017  9:00 AM CST   Return Visit with Jose Jones DO   [facility] ([facility])    [address]  [place] [phone]              Who to contact     If you have questions or need follow up information about today's clinic visit or your schedule please contact [facility] directly at [phone].  Normal or non-critical lab and imaging results will be communicated to you by MyChart, letter or phone within 4 business days after the clinic has received the results. If you do not hear from us within 7 days, please contact the clinic through Excelsofthart or phone. If you have a critical or abnormal lab result, we will notify you by phone as soon as possible.  Submit refill requests through Yoyi Media or call your pharmacy and they will forward the refill request to us. Please allow 3 business days for your refill to be completed.          Additional Information About Your Visit        Excelsofthart Information     [employer] Media lets you send messages to your doctor, view your test results, renew your prescriptions, schedule appointments and more. To sign up, go to [email or web address] . Click on \"Log in\" on the left side of the screen, which will take you to the Welcome page. Then click on \"Sign up Now\" on the right side of the page.     You will be " "asked to enter the access code listed below, as well as some personal information. Please follow the directions to create your username and password.     Your access code is: [email or web address]  Expires: 2018 12:44 PM     Your access code will  in 90 days. If you need help or a new code, please call your [facility] clinic or [phone].        Care EveryWhere ID     This is your Care EveryWhere ID. This could be used by other organizations to access your [facility] medical records  [id number]        Your Vitals Were     Pulse Temperature Height Pulse Oximetry BMI (Body Mass Index)       70 97.8  F (36.6  C) (Temporal) 5' 10.5\" (1.791 m) 96% 32.25 kg/m2        Blood Pressure from Last 3 Encounters:   17 134/80   13 122/78   04/30/10 122/78    Weight from Last 3 Encounters:   17 228 lb (103.4 kg)   10/25/17 226 lb (102.5 kg)   10/18/17 226 lb 4.8 oz (102.6 kg)              We Performed the Following     EKG 12-lead complete w/read - Clinics        Primary Care Provider Fax #    Physician No Ref-Primary [phone]       No address on file        Equal Access to Services     [employer] : Dom morrowo Sorogerioali, waaxda luqadaha, qaybta kaalmada adeegyada, sherwin yoder . So [facility] [phone].    ATENCIÓN: Si habla español, tiene a alicea disposición servicios gratuitos de asistencia lingüística. Llame al [phone].    We comply with applicable federal civil rights laws and [place] laws. We do not discriminate on the basis of race, color, national origin, age, disability, sex, sexual orientation, or gender identity.            Thank you!     Thank you for choosing [facility]  for your care. Our goal is always to provide you with excellent care. Hearing back from our patients is one way we can continue to improve our services. Please take a few minutes to complete the written survey that you may receive in the mail after your visit with us. Thank " you!             Your Updated Medication List - Protect others around you: Learn how to safely use, store and throw away your medicines at www.disposemymeds.org.          This list is accurate as of: 12/1/17  1:14 PM.  Always use your most recent med list.                   Brand Name Dispense Instructions for use Diagnosis    NO ACTIVE MEDICATIONS      .    Disorders of bursae and tendons in shoulder region, unspecified

## 2017-12-11 NOTE — H&P (VIEW-ONLY)
[address] [address]  [phone]  Dept: [phone]    PRE-OP EVALUATION:  Today's date: 2017    Miguelangel Graham (: 1950) presents for pre-operative evaluation assessment as requested by Dr. Jones   He requires evaluation and anesthesia risk assessment prior to undergoing surgery/procedure for treatment of Right shoulder pain .  Proposed procedure: Right shoulder arthroscopy: Rotator cuff repair, biceps tenodesis, clavicle resection and shoulder decompression.    Date of Surgery/ Procedure: 17  Time of Surgery/ Procedure: 1230  Hospital/Surgical Facility: [facility]  Fax number for surgical facility:   Primary Physician: No Ref-Primary, Physician  Type of Anesthesia Anticipated: Combined general with block    Patient has a Health Care Directive or Living Will:  NO    Preop Questions 2017   1.  Do you have a history of heart attack, stroke, stent, bypass or surgery on an artery in the head, neck, heart or legs? No   2.  Do you ever have any pain or discomfort in your chest? No   3.  Do you have a history of  Heart Failure? No   4.   Are you troubled by shortness of breath when:  walking on a level surface, or up a slight hill, or at night? No   5.  Do you currently have a cold, bronchitis or other respiratory infection? No   6.  Do you have a cough, shortness of breath, or wheezing? No   7.  Do you sometimes get pains in the calves of your legs when you walk? No   8. Do you or anyone in your family have previous history of blood clots? No   9.  Do you or does anyone in your family have a serious bleeding problem such as prolonged bleeding following surgeries or cuts? No   10. Have you ever had problems with anemia or been told to take iron pills? No   11. Have you had any abnormal blood loss such as black, tarry or bloody stools? No   12. Have you ever had a blood transfusion? No   13. Have you or any of your relatives ever had problems with  anesthesia? No   14. Do you have sleep apnea, excessive snoring or daytime drowsiness? YES - wife says he snores too much    15. Do you have any prosthetic heart valves? No   16. Do you have prosthetic joints? No           HPI:                                                      Brief HPI related to upcoming procedure: Long-term problems with his right shoulder has had previous surgery for rotator cuff repair now it has become painful again in decreased usage. MRI indicates acute changes          MEDICAL HISTORY:                                                    Patient Active Problem List    Diagnosis Date Noted     AC joint arthropathy 10/25/2017     Priority: Medium     Biceps tendonitis, right 10/25/2017     Priority: Medium     Complete tear of right rotator cuff 10/25/2017     Priority: Medium     CARDIOVASCULAR SCREENING; LDL GOAL LESS THAN 160 10/31/2010     Priority: Medium      No past medical history on file.  Past Surgical History:   Procedure Laterality Date     HC KNEE SCOPE, DIAGNOSTIC      Arthroscopy, Knee     HC REPAIR ROTATOR CUFF,CHRONIC       Current Outpatient Prescriptions   Medication Sig Dispense Refill     NO ACTIVE MEDICATIONS .       OTC products: None, except as noted above    Allergies   Allergen Reactions     No Known Drug Allergies       Latex Allergy: NO    Social History   Substance Use Topics     Smoking status: Former Smoker     Smokeless tobacco: Never Used     Alcohol use Yes     History   Drug Use No       REVIEW OF SYSTEMS:                                                    Constitutional, neuro, ENT, endocrine, pulmonary, cardiac, gastrointestinal, genitourinary, musculoskeletal, integument and psychiatric systems are negative, except as otherwise noted.      EXAM:                                                    There were no vitals taken for this visit.    GENERAL APPEARANCE: healthy, alert and no distress     EYES: EOMI,  PERRL     HENT: ear canals and TM's normal and  nose and mouth without ulcers or lesions     NECK: no adenopathy, no asymmetry, masses, or scars and thyroid normal to palpation     RESP: lungs clear to auscultation - no rales, rhonchi or wheezes     CV: regular rates and rhythm, normal S1 S2, no S3 or S4 and no murmur, click or rub     ABDOMEN:  soft, nontender, no HSM or masses and bowel sounds normal     MS: extremities normal- no gross deformities noted, no evidence of inflammation in joints, FROM in all extremities.     SKIN: no suspicious lesions or rashes     NEURO: Normal strength and tone, sensory exam grossly normal, mentation intact and speech normal     PSYCH: mentation appears normal. and affect normal/bright     LYMPHATICS: No axillary, cervical, or supraclavicular nodes    DIAGNOSTICS:                                                    EKG: sinus bradycardia, normal axis, normal intervals, no acute ST/T changes c/w ischemia, no LVH by voltage criteria    Recent Labs   Lab Test  05/01/13   0803   NA  140   POTASSIUM  4.1   CR  0.86        IMPRESSION:                                                    Reason for surgery/procedure: Right rotator cuff tear     The proposed surgical procedure is considered INTERMEDIATE risk.    REVISED CARDIAC RISK INDEX  The patient has the following serious cardiovascular risks for perioperative complications such as (MI, PE, VFib and 3  AV Block):  No serious cardiac risks  INTERPRETATION: 0 risks: Class I (very low risk - 0.4% complication rate)    The patient has the following additional risks for perioperative complications:  No identified additional risks      ICD-10-CM    1. Preop general physical exam Z01.818        RECOMMENDATIONS:                                                              APPROVAL GIVEN to proceed with proposed procedure, without further diagnostic evaluation       Signed Electronically by: Gregory Horowitz MD    Copy of this evaluation report is provided to requesting  physician.    [facility] Preop Guidelines

## 2017-12-12 ENCOUNTER — SURGERY (OUTPATIENT)
Age: 67
End: 2017-12-12

## 2017-12-12 ENCOUNTER — HOSPITAL ENCOUNTER (OUTPATIENT)
Facility: CLINIC | Age: 67
Discharge: HOME | End: 2017-12-12
Attending: ORTHOPAEDIC SURGERY | Admitting: ORTHOPAEDIC SURGERY
Payer: MEDICARE

## 2017-12-12 ENCOUNTER — ANESTHESIA EVENT (OUTPATIENT)
Dept: SURGERY | Facility: CLINIC | Age: 67
End: 2017-12-12
Payer: MEDICARE

## 2017-12-12 ENCOUNTER — ANESTHESIA (OUTPATIENT)
Dept: SURGERY | Facility: CLINIC | Age: 67
End: 2017-12-12
Payer: MEDICARE

## 2017-12-12 VITALS
BODY MASS INDEX: 31.92 KG/M2 | TEMPERATURE: 97.9 F | WEIGHT: 228 LBS | OXYGEN SATURATION: 98 % | DIASTOLIC BLOOD PRESSURE: 75 MMHG | SYSTOLIC BLOOD PRESSURE: 130 MMHG | RESPIRATION RATE: 16 BRPM | HEIGHT: 71 IN

## 2017-12-12 DIAGNOSIS — M75.121 COMPLETE TEAR OF RIGHT ROTATOR CUFF: Primary | ICD-10-CM

## 2017-12-12 PROCEDURE — 25000128 H RX IP 250 OP 636: Performed by: ORTHOPAEDIC SURGERY

## 2017-12-12 PROCEDURE — 25000566 ZZH SEVOFLURANE, EA 15 MIN: Performed by: ORTHOPAEDIC SURGERY

## 2017-12-12 PROCEDURE — 71000015 ZZH RECOVERY PHASE 1 LEVEL 2 EA ADDTL HR: Performed by: ORTHOPAEDIC SURGERY

## 2017-12-12 PROCEDURE — 23412 REPAIR ROTATOR CUFF CHRONIC: CPT | Mod: AS | Performed by: NURSE PRACTITIONER

## 2017-12-12 PROCEDURE — 29828 SHO ARTHRS SRG BICP TENODSIS: CPT | Mod: RT | Performed by: ORTHOPAEDIC SURGERY

## 2017-12-12 PROCEDURE — 23120 CLAVICULECTOMY PARTIAL: CPT | Mod: AS | Performed by: NURSE PRACTITIONER

## 2017-12-12 PROCEDURE — 23120 CLAVICULECTOMY PARTIAL: CPT | Mod: RT | Performed by: ORTHOPAEDIC SURGERY

## 2017-12-12 PROCEDURE — 25000128 H RX IP 250 OP 636: Performed by: NURSE ANESTHETIST, CERTIFIED REGISTERED

## 2017-12-12 PROCEDURE — 27110028 ZZH OR GENERAL SUPPLY NON-STERILE: Performed by: ORTHOPAEDIC SURGERY

## 2017-12-12 PROCEDURE — 37000008 ZZH ANESTHESIA TECHNICAL FEE, 1ST 30 MIN: Performed by: ORTHOPAEDIC SURGERY

## 2017-12-12 PROCEDURE — 40000306 ZZH STATISTIC PRE PROC ASSESS II: Performed by: ORTHOPAEDIC SURGERY

## 2017-12-12 PROCEDURE — 37000009 ZZH ANESTHESIA TECHNICAL FEE, EACH ADDTL 15 MIN: Performed by: ORTHOPAEDIC SURGERY

## 2017-12-12 PROCEDURE — 71000027 ZZH RECOVERY PHASE 2 EACH 15 MINS: Performed by: ORTHOPAEDIC SURGERY

## 2017-12-12 PROCEDURE — 29826 SHO ARTHRS SRG DECOMPRESSION: CPT | Mod: AS | Performed by: NURSE PRACTITIONER

## 2017-12-12 PROCEDURE — 25000125 ZZHC RX 250: Performed by: NURSE ANESTHETIST, CERTIFIED REGISTERED

## 2017-12-12 PROCEDURE — 29826 SHO ARTHRS SRG DECOMPRESSION: CPT | Mod: 59 | Performed by: ORTHOPAEDIC SURGERY

## 2017-12-12 PROCEDURE — 36000063 ZZH SURGERY LEVEL 4 EA 15 ADDTL MIN: Performed by: ORTHOPAEDIC SURGERY

## 2017-12-12 PROCEDURE — 71000014 ZZH RECOVERY PHASE 1 LEVEL 2 FIRST HR: Performed by: ORTHOPAEDIC SURGERY

## 2017-12-12 PROCEDURE — 25000125 ZZHC RX 250: Performed by: ORTHOPAEDIC SURGERY

## 2017-12-12 PROCEDURE — 27210794 ZZH OR GENERAL SUPPLY STERILE: Performed by: ORTHOPAEDIC SURGERY

## 2017-12-12 PROCEDURE — 29828 SHO ARTHRS SRG BICP TENODSIS: CPT | Mod: AS | Performed by: NURSE PRACTITIONER

## 2017-12-12 PROCEDURE — 27210995 ZZH RX 272: Performed by: ORTHOPAEDIC SURGERY

## 2017-12-12 PROCEDURE — C1713 ANCHOR/SCREW BN/BN,TIS/BN: HCPCS | Performed by: ORTHOPAEDIC SURGERY

## 2017-12-12 PROCEDURE — 36000093 ZZH SURGERY LEVEL 4 1ST 30 MIN: Performed by: ORTHOPAEDIC SURGERY

## 2017-12-12 PROCEDURE — 23412 REPAIR ROTATOR CUFF CHRONIC: CPT | Mod: RT | Performed by: ORTHOPAEDIC SURGERY

## 2017-12-12 DEVICE — IMP ANCHOR SUTURE Q-FIX 1.8MM 25-1800: Type: IMPLANTABLE DEVICE | Site: SHOULDER | Status: FUNCTIONAL

## 2017-12-12 DEVICE — IMP ANCHOR SUTURE HEALICOIL PK 4.5MM 72203378: Type: IMPLANTABLE DEVICE | Site: SHOULDER | Status: FUNCTIONAL

## 2017-12-12 RX ORDER — OXYCODONE HYDROCHLORIDE 5 MG/1
10 TABLET ORAL EVERY 4 HOURS PRN
Status: DISCONTINUED | OUTPATIENT
Start: 2017-12-12 | End: 2017-12-12 | Stop reason: HOSPADM

## 2017-12-12 RX ORDER — PROPOFOL 10 MG/ML
INJECTION, EMULSION INTRAVENOUS PRN
Status: DISCONTINUED | OUTPATIENT
Start: 2017-12-12 | End: 2017-12-12

## 2017-12-12 RX ORDER — LIDOCAINE HYDROCHLORIDE AND EPINEPHRINE 10; 10 MG/ML; UG/ML
INJECTION, SOLUTION INFILTRATION; PERINEURAL PRN
Status: DISCONTINUED | OUTPATIENT
Start: 2017-12-12 | End: 2017-12-12 | Stop reason: HOSPADM

## 2017-12-12 RX ORDER — FENTANYL CITRATE 50 UG/ML
INJECTION, SOLUTION INTRAMUSCULAR; INTRAVENOUS PRN
Status: DISCONTINUED | OUTPATIENT
Start: 2017-12-12 | End: 2017-12-12

## 2017-12-12 RX ORDER — LIDOCAINE HYDROCHLORIDE 20 MG/ML
INJECTION, SOLUTION INFILTRATION; PERINEURAL PRN
Status: DISCONTINUED | OUTPATIENT
Start: 2017-12-12 | End: 2017-12-12

## 2017-12-12 RX ORDER — BUPIVACAINE HYDROCHLORIDE 2.5 MG/ML
INJECTION, SOLUTION INFILTRATION; PERINEURAL PRN
Status: DISCONTINUED | OUTPATIENT
Start: 2017-12-12 | End: 2017-12-12 | Stop reason: HOSPADM

## 2017-12-12 RX ORDER — FENTANYL CITRATE 50 UG/ML
25-50 INJECTION, SOLUTION INTRAMUSCULAR; INTRAVENOUS
Status: DISCONTINUED | OUTPATIENT
Start: 2017-12-12 | End: 2017-12-12 | Stop reason: HOSPADM

## 2017-12-12 RX ORDER — SODIUM CHLORIDE, SODIUM LACTATE, POTASSIUM CHLORIDE, CALCIUM CHLORIDE 600; 310; 30; 20 MG/100ML; MG/100ML; MG/100ML; MG/100ML
INJECTION, SOLUTION INTRAVENOUS CONTINUOUS
Status: DISCONTINUED | OUTPATIENT
Start: 2017-12-12 | End: 2017-12-12 | Stop reason: HOSPADM

## 2017-12-12 RX ORDER — CEFAZOLIN SODIUM 2 G/100ML
2 INJECTION, SOLUTION INTRAVENOUS
Status: COMPLETED | OUTPATIENT
Start: 2017-12-12 | End: 2017-12-12

## 2017-12-12 RX ORDER — ONDANSETRON 2 MG/ML
4 INJECTION INTRAMUSCULAR; INTRAVENOUS EVERY 30 MIN PRN
Status: COMPLETED | OUTPATIENT
Start: 2017-12-12 | End: 2017-12-12

## 2017-12-12 RX ORDER — GLYCOPYRROLATE 0.2 MG/ML
INJECTION, SOLUTION INTRAMUSCULAR; INTRAVENOUS PRN
Status: DISCONTINUED | OUTPATIENT
Start: 2017-12-12 | End: 2017-12-12

## 2017-12-12 RX ORDER — METHOCARBAMOL 750 MG/1
750 TABLET, FILM COATED ORAL EVERY 6 HOURS PRN
Qty: 60 TABLET | Refills: 1 | Status: SHIPPED | OUTPATIENT
Start: 2017-12-12 | End: 2018-01-22

## 2017-12-12 RX ORDER — DIMENHYDRINATE 50 MG/ML
25 INJECTION, SOLUTION INTRAMUSCULAR; INTRAVENOUS EVERY 6 HOURS PRN
Status: DISCONTINUED | OUTPATIENT
Start: 2017-12-12 | End: 2017-12-12 | Stop reason: HOSPADM

## 2017-12-12 RX ORDER — NALOXONE HYDROCHLORIDE 0.4 MG/ML
.1-.4 INJECTION, SOLUTION INTRAMUSCULAR; INTRAVENOUS; SUBCUTANEOUS
Status: DISCONTINUED | OUTPATIENT
Start: 2017-12-12 | End: 2017-12-12 | Stop reason: HOSPADM

## 2017-12-12 RX ORDER — NEOSTIGMINE METHYLSULFATE 1 MG/ML
VIAL (ML) INJECTION PRN
Status: DISCONTINUED | OUTPATIENT
Start: 2017-12-12 | End: 2017-12-12

## 2017-12-12 RX ORDER — DEXAMETHASONE SODIUM PHOSPHATE 10 MG/ML
INJECTION, SOLUTION INTRAMUSCULAR; INTRAVENOUS PRN
Status: DISCONTINUED | OUTPATIENT
Start: 2017-12-12 | End: 2017-12-12

## 2017-12-12 RX ORDER — BUPIVACAINE HYDROCHLORIDE AND EPINEPHRINE 5; 5 MG/ML; UG/ML
INJECTION, SOLUTION PERINEURAL PRN
Status: DISCONTINUED | OUTPATIENT
Start: 2017-12-12 | End: 2017-12-12

## 2017-12-12 RX ORDER — OXYCODONE HYDROCHLORIDE 5 MG/1
5-10 TABLET ORAL
Qty: 70 TABLET | Refills: 0 | Status: SHIPPED | OUTPATIENT
Start: 2017-12-12 | End: 2018-01-22

## 2017-12-12 RX ORDER — OXYCODONE HYDROCHLORIDE 5 MG/1
5 TABLET ORAL
Status: DISCONTINUED | OUTPATIENT
Start: 2017-12-12 | End: 2017-12-12 | Stop reason: HOSPADM

## 2017-12-12 RX ORDER — EPHEDRINE SULFATE 50 MG/ML
INJECTION, SOLUTION INTRAMUSCULAR; INTRAVENOUS; SUBCUTANEOUS PRN
Status: DISCONTINUED | OUTPATIENT
Start: 2017-12-12 | End: 2017-12-12

## 2017-12-12 RX ORDER — ONDANSETRON 2 MG/ML
INJECTION INTRAMUSCULAR; INTRAVENOUS PRN
Status: DISCONTINUED | OUTPATIENT
Start: 2017-12-12 | End: 2017-12-12

## 2017-12-12 RX ORDER — ACETAMINOPHEN 10 MG/ML
INJECTION, SOLUTION INTRAVENOUS PRN
Status: DISCONTINUED | OUTPATIENT
Start: 2017-12-12 | End: 2017-12-12

## 2017-12-12 RX ORDER — AMOXICILLIN 250 MG
1-2 CAPSULE ORAL 2 TIMES DAILY
Qty: 30 TABLET | Refills: 1 | Status: SHIPPED | OUTPATIENT
Start: 2017-12-12 | End: 2017-12-21

## 2017-12-12 RX ORDER — HYDROMORPHONE HYDROCHLORIDE 1 MG/ML
.3-.5 INJECTION, SOLUTION INTRAMUSCULAR; INTRAVENOUS; SUBCUTANEOUS EVERY 10 MIN PRN
Status: DISCONTINUED | OUTPATIENT
Start: 2017-12-12 | End: 2017-12-12 | Stop reason: HOSPADM

## 2017-12-12 RX ORDER — ALBUTEROL SULFATE 0.83 MG/ML
2.5 SOLUTION RESPIRATORY (INHALATION) EVERY 4 HOURS PRN
Status: DISCONTINUED | OUTPATIENT
Start: 2017-12-12 | End: 2017-12-12 | Stop reason: HOSPADM

## 2017-12-12 RX ORDER — MEPERIDINE HYDROCHLORIDE 50 MG/ML
12.5 INJECTION INTRAMUSCULAR; INTRAVENOUS; SUBCUTANEOUS
Status: DISCONTINUED | OUTPATIENT
Start: 2017-12-12 | End: 2017-12-12 | Stop reason: HOSPADM

## 2017-12-12 RX ORDER — CEFAZOLIN SODIUM 1 G/3ML
1 INJECTION, POWDER, FOR SOLUTION INTRAMUSCULAR; INTRAVENOUS SEE ADMIN INSTRUCTIONS
Status: DISCONTINUED | OUTPATIENT
Start: 2017-12-12 | End: 2017-12-12 | Stop reason: HOSPADM

## 2017-12-12 RX ORDER — ONDANSETRON 4 MG/1
4 TABLET, ORALLY DISINTEGRATING ORAL EVERY 30 MIN PRN
Status: COMPLETED | OUTPATIENT
Start: 2017-12-12 | End: 2017-12-12

## 2017-12-12 RX ADMIN — MIDAZOLAM 2 MG: 1 INJECTION INTRAMUSCULAR; INTRAVENOUS at 12:38

## 2017-12-12 RX ADMIN — GLYCOPYRROLATE 0.4 MG: 0.2 INJECTION, SOLUTION INTRAMUSCULAR; INTRAVENOUS at 14:57

## 2017-12-12 RX ADMIN — Medication 100 MG: at 12:50

## 2017-12-12 RX ADMIN — SODIUM CHLORIDE, POTASSIUM CHLORIDE, SODIUM LACTATE AND CALCIUM CHLORIDE: 600; 310; 30; 20 INJECTION, SOLUTION INTRAVENOUS at 12:52

## 2017-12-12 RX ADMIN — ONDANSETRON 4 MG: 2 INJECTION, SOLUTION INTRAMUSCULAR; INTRAVENOUS at 17:10

## 2017-12-12 RX ADMIN — SODIUM CHLORIDE, POTASSIUM CHLORIDE, SODIUM LACTATE AND CALCIUM CHLORIDE: 600; 310; 30; 20 INJECTION, SOLUTION INTRAVENOUS at 14:51

## 2017-12-12 RX ADMIN — DEXAMETHASONE SODIUM PHOSPHATE 2.5 MG: 10 INJECTION, SOLUTION INTRAMUSCULAR; INTRAVENOUS at 15:26

## 2017-12-12 RX ADMIN — DEXAMETHASONE SODIUM PHOSPHATE 2.5 MG: 10 INJECTION, SOLUTION INTRAMUSCULAR; INTRAVENOUS at 15:25

## 2017-12-12 RX ADMIN — DIMENHYDRINATE 25 MG: 50 INJECTION, SOLUTION INTRAMUSCULAR; INTRAVENOUS at 17:01

## 2017-12-12 RX ADMIN — FENTANYL CITRATE 50 MCG: 50 INJECTION, SOLUTION INTRAMUSCULAR; INTRAVENOUS at 13:20

## 2017-12-12 RX ADMIN — Medication 5 ML: at 15:27

## 2017-12-12 RX ADMIN — SODIUM CHLORIDE, POTASSIUM CHLORIDE, SODIUM LACTATE AND CALCIUM CHLORIDE: 600; 310; 30; 20 INJECTION, SOLUTION INTRAVENOUS at 12:01

## 2017-12-12 RX ADMIN — Medication 5 ML: at 15:25

## 2017-12-12 RX ADMIN — ROCURONIUM BROMIDE 10 MG: 10 INJECTION INTRAVENOUS at 12:49

## 2017-12-12 RX ADMIN — LIDOCAINE HYDROCHLORIDE 1 ML: 10 INJECTION, SOLUTION EPIDURAL; INFILTRATION; INTRACAUDAL; PERINEURAL at 12:02

## 2017-12-12 RX ADMIN — DEXAMETHASONE SODIUM PHOSPHATE 2.5 MG: 10 INJECTION, SOLUTION INTRAMUSCULAR; INTRAVENOUS at 15:28

## 2017-12-12 RX ADMIN — CEFAZOLIN SODIUM 2 G: 2 INJECTION, SOLUTION INTRAVENOUS at 12:58

## 2017-12-12 RX ADMIN — LIDOCAINE HYDROCHLORIDE,EPINEPHRINE BITARTRATE 20 ML: 10; .01 INJECTION, SOLUTION INFILTRATION; PERINEURAL at 13:14

## 2017-12-12 RX ADMIN — ONDANSETRON 4 MG: 2 INJECTION, SOLUTION INTRAMUSCULAR; INTRAVENOUS at 16:42

## 2017-12-12 RX ADMIN — CEFAZOLIN 1 G: 1 INJECTION, POWDER, FOR SOLUTION INTRAMUSCULAR; INTRAVENOUS at 14:54

## 2017-12-12 RX ADMIN — FENTANYL CITRATE 100 MCG: 50 INJECTION, SOLUTION INTRAMUSCULAR; INTRAVENOUS at 12:43

## 2017-12-12 RX ADMIN — FENTANYL CITRATE 50 MCG: 50 INJECTION, SOLUTION INTRAMUSCULAR; INTRAVENOUS at 13:50

## 2017-12-12 RX ADMIN — Medication 10 MG: at 13:16

## 2017-12-12 RX ADMIN — ONDANSETRON 4 MG: 2 INJECTION INTRAMUSCULAR; INTRAVENOUS at 14:55

## 2017-12-12 RX ADMIN — Medication 3 MG: at 14:57

## 2017-12-12 RX ADMIN — Medication 5 ML: at 15:26

## 2017-12-12 RX ADMIN — FENTANYL CITRATE 50 MCG: 50 INJECTION, SOLUTION INTRAMUSCULAR; INTRAVENOUS at 15:10

## 2017-12-12 RX ADMIN — Medication 10 MG: at 13:28

## 2017-12-12 RX ADMIN — DEXAMETHASONE SODIUM PHOSPHATE 10 MG: 10 INJECTION, SOLUTION INTRAMUSCULAR; INTRAVENOUS at 13:05

## 2017-12-12 RX ADMIN — LIDOCAINE HYDROCHLORIDE 40 MG: 20 INJECTION, SOLUTION INFILTRATION; PERINEURAL at 12:50

## 2017-12-12 RX ADMIN — DEXAMETHASONE SODIUM PHOSPHATE 2.5 MG: 10 INJECTION, SOLUTION INTRAMUSCULAR; INTRAVENOUS at 15:27

## 2017-12-12 RX ADMIN — ROCURONIUM BROMIDE 40 MG: 10 INJECTION INTRAVENOUS at 13:00

## 2017-12-12 RX ADMIN — EPINEPHRINE 3000 ML GIVEN: 1 INJECTION INTRAMUSCULAR; INTRAVENOUS; SUBCUTANEOUS at 13:20

## 2017-12-12 RX ADMIN — PROPOFOL 150 MG: 10 INJECTION, EMULSION INTRAVENOUS at 12:50

## 2017-12-12 RX ADMIN — ACETAMINOPHEN 1000 MG: 10 INJECTION, SOLUTION INTRAVENOUS at 13:18

## 2017-12-12 RX ADMIN — Medication 5 ML: at 15:28

## 2017-12-12 RX ADMIN — Medication 5 MG: at 14:00

## 2017-12-12 ASSESSMENT — LIFESTYLE VARIABLES: TOBACCO_USE: 0

## 2017-12-12 NOTE — IP AVS SNAPSHOT
[facility]    [address]     [place] [address]    Phone:  [phone]                                       After Visit Summary   12/12/2017    Miguelangel Graham    MRN: [id number]           After Visit Summary Signature Page     I have received my discharge instructions, and my questions have been answered. I have discussed any challenges I see with this plan with the nurse or doctor.    ..........................................................................................................................................  Patient/Patient Representative Signature      ..........................................................................................................................................  Patient Representative Print Name and Relationship to Patient    ..................................................               ................................................  Date                                            Time    ..........................................................................................................................................  Reviewed by Signature/Title    ...................................................              ..............................................  Date                                                            Time

## 2017-12-12 NOTE — ANESTHESIA CARE TRANSFER NOTE
Patient: Miguelangel Graham    Procedure(s):   - Wound Class: I-Clean   - Wound Class: I-Clean   - Wound Class: I-Clean  right shoulder arthroscopy with open mini rotator cuff repair, biceps tenodesis, distal clavicle and subacromial decompression with partial acromioplasty and removal of foreing body - Wound Class: I-Clean   - Wound Class: II-Clean Contaminated    Diagnosis: complete tear right rotator cuff, biceps tenodesis, AC joint arthropathy  Diagnosis Additional Information: No value filed.    Anesthesia Type:   General, ETT, Periph. Nerve Block for postop pain     Note:  Airway :Face Mask  Patient transferred to:PACU  Handoff Report: Identifed the Patient, Identified the Reponsible Provider, Reviewed the pertinent medical history, Discussed the surgical course, Reviewed Intra-OP anesthesia mangement and issues during anesthesia, Set expectations for post-procedure period and Allowed opportunity for questions and acknowledgement of understanding      Vitals: (Last set prior to Anesthesia Care Transfer)    CRNA VITALS  12/12/2017 1506 - 12/12/2017 1541      12/12/2017             Pulse: 61    SpO2: 98 %    EKG: Sinus bradycardia                Electronically Signed By: POONAM Hinson CRNA  December 12, 2017  3:41 PM

## 2017-12-12 NOTE — ANESTHESIA POSTPROCEDURE EVALUATION
Patient: Miguelangel Graham    Procedure(s):   - Wound Class: I-Clean   - Wound Class: I-Clean   - Wound Class: I-Clean  right shoulder arthroscopy with open mini rotator cuff repair, biceps tenodesis, distal clavicle and subacromial decompression with partial acromioplasty and removal of foreing body - Wound Class: I-Clean   - Wound Class: II-Clean Contaminated    Diagnosis:complete tear right rotator cuff, biceps tenodesis, AC joint arthropathy  Diagnosis Additional Information: No value filed.    Anesthesia Type:  General, ETT, Periph. Nerve Block for postop pain    Note:  Anesthesia Post Evaluation    Patient location during evaluation: PACU and Bedside  Patient participation: Able to fully participate in evaluation  Level of consciousness: awake and alert  Pain management: satisfactory to patient  Airway patency: patent  Cardiovascular status: stable  Respiratory status: nasal cannula  Hydration status: stable  PONV: none     Anesthetic complications: None    Comments: Appear to tolerate Gen with right ISB well without anesthesia related problems / complications noted.  Pain level satisfactory per patient. No N  /  V.  No complaints per patient.  Will follow as needed.        Last vitals:  Vitals:    12/12/17 1615 12/12/17 1630 12/12/17 1634   BP: 132/78 118/63 118/63   Resp: 20 22 18   Temp:      SpO2: 94% 94% 93%         Electronically Signed By: POONAM Hinson CRNA  December 12, 2017  4:42 PM

## 2017-12-12 NOTE — ANESTHESIA PREPROCEDURE EVALUATION
Anesthesia Evaluation     . Pt has had prior anesthetic. Type: General    No history of anesthetic complications          ROS/MED HX    ENT/Pulmonary:  - neg pulmonary ROS    (-) tobacco use   Neurologic:  - neg neurologic ROS     Cardiovascular:  - neg cardiovascular ROS   (+) ----. : . . . :. . Previous cardiac testing date:results:date: results:ECG reviewed date:12/1/17 results:sinus bradycardia, normal axis, normal intervals, no acute ST/T changes c/w ischemia, no LVH by voltage criteria date: results:          METS/Exercise Tolerance:     Hematologic:  - neg hematologic  ROS       Musculoskeletal:   (+) , , other musculoskeletal- Right shoulder arthropathy - presents for surgical intervention of same      GI/Hepatic:  - neg GI/hepatic ROS       Renal/Genitourinary:  - ROS Renal section negative       Endo:  - neg endo ROS       Psychiatric:  - neg psychiatric ROS       Infectious Disease:  - neg infectious disease ROS       Malignancy:      - no malignancy   Other:    - neg other ROS                 Physical Exam  Normal systems: cardiovascular, pulmonary and dental    Airway   Mallampati: II  TM distance: >3 FB  Neck ROM: full    Dental     Cardiovascular   Rhythm and rate: regular and normal  (-) no murmur    Pulmonary    breath sounds clear to auscultation                    Anesthesia Plan      History & Physical Review  History and physical reviewed and following examination; no interval change.    ASA Status:  2 .    NPO Status:  > 6 hours    Plan for General, ETT and Periph. Nerve Block for postop pain with Propofol and Intravenous induction. Maintenance will be Balanced.    PONV prophylaxis:  Ondansetron (or other 5HT-3) and Dexamethasone or Solumedrol  Right interscalene block postoperatively was discussed      Postoperative Care  Postoperative pain management:  IV analgesics, Oral pain medications and Peripheral nerve block (Single Shot).      Consents  Anesthetic plan, risks, benefits and  alternatives discussed with:  Patient.  Use of blood products discussed: No .   .                          .

## 2017-12-12 NOTE — IP AVS SNAPSHOT
MRN:[id number]                      After Visit Summary   12/12/2017    Miguelangel Graham    MRN: [id number]           Thank you!     Thank you for choosing [facility] for your care. Our goal is always to provide you with excellent care. Hearing back from our patients is one way we can continue to improve our services. Please take a few minutes to complete the written survey that you may receive in the mail after you visit with us. Thank you!        Patient Information     Date Of Birth          1950        About your hospital stay     You were admitted on:  December 12, 2017 You last received care in the:  [facility] II    You were discharged on:  December 12, 2017       Who to Call     For medical emergencies, please call 911.  For non-urgent questions about your medical care, please call your primary care provider or clinic, None  For questions related to your surgery, please call your surgery clinic        Attending Provider     Provider Jose Santiago,  Orthopedics       Primary Care Provider Fax #    Physician No Ref-Primary [phone]      After Care Instructions      Diet as Tolerated       Return to diet before surgery, unless instructed otherwise.            Discharge Instructions       Review outpatient procedure discharge instructions with patient as directed by Provider            Discharge Instructions - Lifting Limit (specify)       Lifting limit  0 pounds until seen at Post-op follow up appointment.            Dressing Change       Change dressing on third day after surgery.            Ice to affected area       Ice pack to surgical site every 15 minutes per hour for 24 hours            No Alcohol       For 24 hours post procedure or when taking pain medications            No driving or operating machinery        until further notice            Notify Provider       For signs and symptoms of infection: Fever greater than 101, redness,  swelling, heat at site, drainage, pus.            Return to clinic       Return to clinic in 10 days            Wound care       Do not immerse wound in water until sutures removed                  Your next 10 appointments already scheduled     Dec 21, 2017  9:00 AM CST   [facility]    [address] [id number] [phone]              Further instructions from your care team       General Shoulder Arthroscopy Discharge Instructions                                        [phone]  Bone and Joint Service Line for issues or concerns        General Care:  After surgery you may feel tired/sleepy. This is normal. Please have someone stay with you for 24 hours after surgery. You should avoid driving for 1-2 days after surgery, as your reaction time may be slow. You should not drive at all if you have had surgery on your arms, right leg and/or are taking narcotic pain medications until released by your doctor. If you have any question along the way please contact the office. If you feel it is an issue cannot wait for normal office hours, contact the on-call physician.    Bandages:    Change your bandage after the first 48-72 hours. You may use Band-Aids or sterile gauze with a small amount of tape. It s normal to have some blood-tinged fluid on your bandages, this will usually continue for the first day or two. Keep the area clean and dry. Do not apply any ointments.     Bathing:  Do not submerge your incision in water such as a bath or pool. It is ok to shower after removing your initial bandage after the first 2 days from surgery. Avoid any excessively hot showers, baths, or hot tubes after surgery.     Follow up:  Your follow up appointment should already be scheduled. If its not, please call the office to verify an appointment 10 days after surgery.     Diet:  Start with non-alcoholic liquids at  first, particularly water or sports drinks after surgery. Progress to bland foods such as crackers and bread and finally to your normal diet if you have no problems.     Pain control:  Take your pain medications as prescribed. These medications may make you sleepy. Do not drive, operate equipment, or drink alcohol when taking these.  You may take Tylenol (Generic name is acetaminophen) as directed on the bottle for additional relief or in place of the prescribed pain medications as your pain gets better. If the medications cause a reaction such as nausea or skin rash, stop taking them and contact your doctor. Please plan accordingly, pain medications will not be re-filled on the weekends or at night. Call the office during the day if you need more medications. Narcotic pain medication can cause constipation, take the stool softer as prescribed.         Sleeping Position:  Sometimes this can be a challenge after shoulder surgery. Some find it comfortable sleeping propped up on several pillows with pillows also behind their elbow. Others, if available, sleep in an easy chair. You may also lay on your back, it may be more comfortable to have a pillow behind your elbow to keep it from falling back.     Sling/Brace:  Keep the sling or brace on after surgery until your follow up. You may remove to bathe, but keep your elbow to your side and do not reach with your arm. You should also slip your arm out of your sling and allow your elbow to straighten all the way out and then bend all the way up. You may need to use your other arm to assist in this. Also make sure to move your wrist back and forth and practice making a fist. Do this 10-15 times about 3-4 times a day.       Physical Therapy:  Depending on your surgery, physical therapy may start within a few days or be delayed 4-6 weeks. At your first post-operative visit, your doctor will direct you on your personal therapy program. You may start the Codman exercises as  listed below if pain is controlled.     Interscalene Nerve Block   Regional anesthesia is injected into or around the nerves to anesthetize the area supplied by that set of nerves.  It is a type of local anesthesia used to numb a specific area, and is used to control pain and decrease the need for narcotic following surgery.  Interscalene Block: A block injected near the neck/upper shoulder for post-operative pain relief after upper arm or shoulder surgery.  Benefits: Significant to total pain relief following extensive surgeries involving the shoulder and upper arm.  Additional benefits include: Decreased pain medication requirements, reduced incidence of nausea and vomiting, and potentially early discharge home.  Normal Course: A numb and often immobile shoulder and upper arm is expected for approximately 8-12 hours.  The duration of the numbness can vary and is dependent on the type of local anesthetic used, additive and individual variation.    Once the numbness starts to wear off: the discomfort from surgery will intensify progressively over the next 1-2 hours.  We recommend starting oral narcotics (e.g. Vicodin or Percocet) and anti-inflammatory medications (e.g. ibuprofen or Motrin) as soon as oral medications are tolerated.  These medications should be taken on a scheduled basis, allowing for a smooth transition from the nerve block to oral medication based relief.  Normal and expected side effects: a droopy eyelid and blurry vision on the affected side, along with voice hoarseness can last as long as the local anesthetic effect.  Local anesthetic effect varies, but is typically between 8 and 24 hours.  Mild sensation of shortness of breath may be noted particularly in patients with respiratory disease  Risks: failed block, bleeding, infection, reaction to local anesthetic including seizure and cardiac arrest, spinal block, epidural adeline, collapsed lung, peripheral nerve injury or persistent tingling  sensation are all potential risks.  Please discuss any concerns regarding these risks with your anesthesia care provider.  These are most likely to occur at the time of administration of the block.   Additional Recommendations: Please keep the operative arm and elbow well protected and padded for the duration of numbness.  This will prevent unrecognized pressure from being placed on the arm that could result in nerve injury.  Post-operative call: To ensure your safety, you will receive a nursing call 24 hours after surgery.  If you have additional concerns or if you feel that the medications are not wearing off, please inform the nurse or your anesthesia care provider.  Please discuss all concerns regarding this procedure and your anesthetic care with your anesthesia care provider.  The above information is not intended as a substitute for a complete discussion with your anesthesia care provider.  It is intended for your education and to enhance your ability to ask informed questions.    Codman Shoulder Exercises:  Bend over at the waist and let your arm completely relax. Support your weight by leaning on a table or counter.  Swing your arm slowly from side to side.  Repeat this 20 times, four times each day:      Normal findings after surgery:  It is sometimes normal to have pain in the back of the shoulder even before the block wears off in the hand or elbow.  Warmth and swelling about the hand and shoulder is normal for up to 3-4 weeks after surgery.  Numbness around the incisions is normal.  You may have bruising around the incisions and into the bicep area. You may notice this bruising settle into the elbow and forearm.   Low grade fevers less than 100.5 degrees Fahrenheit are normal.     When to call the Office:  [phone]  Temperature greater than 101.5 degrees Fahreheit.  Fever, chills, and increasing pain in the shoulder.  Excessive drainage from the incisions that include bright red blood.  Drainage  from the incisions sites that appear yellow, pus-like, or foul smelling.  Increasing pain the shoulder not relieved by the prescribed pain medications or ice.  Persistent nausea or vomiting   Any other effects you feel are significant.    [facility] Adult Discharge Orders & Instructions     For 24 hours after surgery    1. Get plenty of rest.  A responsible adult must stay with you for at least 24 hours after you leave the hospital.   2. Do not drive or use heavy equipment.  If you have weakness or tingling, don't drive or use heavy equipment until this feeling goes away.  3. Do not drink alcohol.  4. Avoid strenuous or risky activities.  Ask for help when climbing stairs.   5. You may feel lightheaded.  IF so, sit for a few minutes before standing.  Have someone help you get up.   6. If you have nausea (feel sick to your stomach): Drink only clear liquids such as apple juice, ginger ale, broth or 7-Up.  Rest may also help.  Be sure to drink enough fluids.  Move to a regular diet as you feel able.  7. You may have a slight fever. Call the doctor if your fever is over 100 F (37.7 C) (taken under the tongue) or lasts longer than 24 hours.  8. You may have a dry mouth, a sore throat, muscle aches or trouble sleeping.  These should go away after 24 hours.  9. Do not make important or legal decisions.   Call your doctor for any of the followin.  Signs of infection (fever, growing tenderness at the surgery site, a large amount of drainage or bleeding, severe pain, foul-smelling drainage, redness, swelling).    2. It has been over 8 to 10 hours since surgery and you are still not able to urinate (pass water).        Pending Results     No orders found from 12/10/2017 to 2017.            Admission Information     Date & Time Provider Department Dept. Phone    2017 Jose Jones DO [facility] Phase [phone]      Your Vitals Were     Blood Pressure  "Temperature Respirations Height Weight Pulse Oximetry    130/84 97.9  F (36.6  C) 16 1.791 m (5' 10.5\") 103.4 kg (228 lb) 92%    BMI (Body Mass Index)                   32.25 kg/m2           [employer] Information     [employer] lets you send messages to your doctor, view your test results, renew your prescriptions, schedule appointments and more. To sign up, go to [email or web address] . Click on \"Log in\" on the left side of the screen, which will take you to the Welcome page. Then click on \"Sign up Now\" on the right side of the page.     You will be asked to enter the access code listed below, as well as some personal information. Please follow the directions to create your username and password.     Your access code is: [email or web address]  Expires: 2018 12:44 PM     Your access code will  in 90 days. If you need help or a new code, please call your [facility] clinic or [phone].        Care EveryWhere ID     This is your Care EveryWhere ID. This could be used by other organizations to access your [facility] medical records  [id number]        Equal Access to Services     OLIVIER SKY AH: Dom Courtney, dinadra jackman, qapaula kaalmada toyin, sherwin varghese. So [facility] [phone].    ATENCIÓN: Si habla español, tiene a alicea disposición servicios gratuitos de asistencia lingüística. Llame al [phone].    We comply with applicable federal civil rights laws and [place] laws. We do not discriminate on the basis of race, color, national origin, age, disability, sex, sexual orientation, or gender identity.               Review of your medicines      START taking        Dose / Directions    methocarbamol 750 MG tablet   Commonly known as:  ROBAXIN   Used for:  Complete tear of right rotator cuff        Dose:  750 mg   Take 1 tablet (750 mg) by mouth every 6 hours as needed for muscle spasms (muscle spasm)   Quantity:  60 tablet   Refills:  1       oxyCODONE IR 5 MG tablet "   Commonly known as:  ROXICODONE   Used for:  Complete tear of right rotator cuff        Dose:  5-10 mg   Take 1-2 tablets (5-10 mg) by mouth every 3 hours as needed for pain or other (Moderate to Severe)   Quantity:  70 tablet   Refills:  0       senna-docusate 8.6-50 MG per tablet   Commonly known as:  SENOKOT-S;PERICOLACE   Used for:  Complete tear of right rotator cuff        Dose:  1-2 tablet   Take 1-2 tablets by mouth 2 times daily Take while on oral narcotics to prevent or treat constipation.   Quantity:  30 tablet   Refills:  1         CONTINUE these medicines which have NOT CHANGED        Dose / Directions    NO ACTIVE MEDICATIONS   Used for:  Disorders of bursae and tendons in shoulder region, unspecified        .   Refills:  0            Where to get your medicines      Some of these will need a paper prescription and others can be bought over the counter. Ask your nurse if you have questions.     Bring a paper prescription for each of these medications     methocarbamol 750 MG tablet    oxyCODONE IR 5 MG tablet    senna-docusate 8.6-50 MG per tablet                Protect others around you: Learn how to safely use, store and throw away your medicines at www.disposemymeds.org.             Medication List: This is a list of all your medications and when to take them. Check marks below indicate your daily home schedule. Keep this list as a reference.      Medications           Morning Afternoon Evening Bedtime As Needed    methocarbamol 750 MG tablet   Commonly known as:  ROBAXIN   Take 1 tablet (750 mg) by mouth every 6 hours as needed for muscle spasms (muscle spasm)                                NO ACTIVE MEDICATIONS   .                                oxyCODONE IR 5 MG tablet   Commonly known as:  ROXICODONE   Take 1-2 tablets (5-10 mg) by mouth every 3 hours as needed for pain or other (Moderate to Severe)                                senna-docusate 8.6-50 MG per tablet   Commonly known as:   SENOKOT-S;PERICOLACE   Take 1-2 tablets by mouth 2 times daily Take while on oral narcotics to prevent or treat constipation.

## 2017-12-12 NOTE — INTERVAL H&P NOTE
This H&P has been reviewed and there are no clinically significant changes in the patient s condition.  The patient was evaluated by myself as well as Dr. Javad Horowitz prior to surgery. The Patient is approved for surgery.

## 2017-12-12 NOTE — BRIEF OP NOTE
[facility] Brief Operative Note    Pre-operative diagnosis: complete tear right rotator cuff, subacromial impingement, long head biceps tendon tear, AC joint arthropathy   Post-operative diagnosis: right shoudler retained suture, rotator cuff tear, subacromial impingment, AC joint arthritis, biceps long head tear   Procedure: Procedure(s):  ARTHROSCOPY SHOULDER arthroscopic BICEPS TENODESIS   Open SHOULDER DISTAL CLAVICLE RESECTION    ARTHROSCOPY SHOULDER  subacromial decompression with partial acromioplasty,    Open mini open rotator cuff repair.    REMOVE FOREIGN BODY UPPER EXTREMITY   Surgeon: Jose Jones DO   Assistant(s): Gurdeep Sales, APRN, CNP, DNP (A advanced practice provider was necessary for his expertise, exposure and surgical assistance throughout the case.)   Estimated blood loss:  Fluids: Less than 10 ml  2200 ml Crystalloids   Specimens: None   Findings: See dictated operative report for full details [id number]     Bhupendra Jones D.O.

## 2017-12-12 NOTE — DISCHARGE INSTRUCTIONS
General Shoulder Arthroscopy Discharge Instructions                                        [phone]  [facility] Service Line for issues or concerns        General Care:  After surgery you may feel tired/sleepy. This is normal. Please have someone stay with you for 24 hours after surgery. You should avoid driving for 1-2 days after surgery, as your reaction time may be slow. You should not drive at all if you have had surgery on your arms, right leg and/or are taking narcotic pain medications until released by your doctor. If you have any question along the way please contact the office. If you feel it is an issue cannot wait for normal office hours, contact the on-call physician.    Bandages:    Change your bandage after the first 48-72 hours. You may use Band-Aids or sterile gauze with a small amount of tape. It s normal to have some blood-tinged fluid on your bandages, this will usually continue for the first day or two. Keep the area clean and dry. Do not apply any ointments.     Bathing:  Do not submerge your incision in water such as a bath or pool. It is ok to shower after removing your initial bandage after the first 2 days from surgery. Avoid any excessively hot showers, baths, or hot tubes after surgery.     Follow up:  Your follow up appointment should already be scheduled. If its not, please call the office to verify an appointment 10 days after surgery.     Diet:  Start with non-alcoholic liquids at first, particularly water or sports drinks after surgery. Progress to bland foods such as crackers and bread and finally to your normal diet if you have no problems.     Pain control:  Take your pain medications as prescribed. These medications may make you sleepy. Do not drive, operate equipment, or drink alcohol when taking these.  You may take Tylenol (Generic name is acetaminophen) as directed on the bottle for additional relief or in place of the prescribed pain medications as your pain gets  better. If the medications cause a reaction such as nausea or skin rash, stop taking them and contact your doctor. Please plan accordingly, pain medications will not be re-filled on the weekends or at night. Call the office during the day if you need more medications. Narcotic pain medication can cause constipation, take the stool softer as prescribed.         Sleeping Position:  Sometimes this can be a challenge after shoulder surgery. Some find it comfortable sleeping propped up on several pillows with pillows also behind their elbow. Others, if available, sleep in an easy chair. You may also lay on your back, it may be more comfortable to have a pillow behind your elbow to keep it from falling back.     Sling/Brace:  Keep the sling or brace on after surgery until your follow up. You may remove to bathe, but keep your elbow to your side and do not reach with your arm. You should also slip your arm out of your sling and allow your elbow to straighten all the way out and then bend all the way up. You may need to use your other arm to assist in this. Also make sure to move your wrist back and forth and practice making a fist. Do this 10-15 times about 3-4 times a day.       Physical Therapy:  Depending on your surgery, physical therapy may start within a few days or be delayed 4-6 weeks. At your first post-operative visit, your doctor will direct you on your personal therapy program. You may start the Codman exercises as listed below if pain is controlled.     Interscalene Nerve Block   Regional anesthesia is injected into or around the nerves to anesthetize the area supplied by that set of nerves.  It is a type of local anesthesia used to numb a specific area, and is used to control pain and decrease the need for narcotic following surgery.  Interscalene Block: A block injected near the neck/upper shoulder for post-operative pain relief after upper arm or shoulder surgery.  Benefits: Significant to total pain relief  following extensive surgeries involving the shoulder and upper arm.  Additional benefits include: Decreased pain medication requirements, reduced incidence of nausea and vomiting, and potentially early discharge home.  Normal Course: A numb and often immobile shoulder and upper arm is expected for approximately 8-12 hours.  The duration of the numbness can vary and is dependent on the type of local anesthetic used, additive and individual variation.    Once the numbness starts to wear off: the discomfort from surgery will intensify progressively over the next 1-2 hours.  We recommend starting oral narcotics (e.g. Vicodin or Percocet) and anti-inflammatory medications (e.g. ibuprofen or Motrin) as soon as oral medications are tolerated.  These medications should be taken on a scheduled basis, allowing for a smooth transition from the nerve block to oral medication based relief.  Normal and expected side effects: a droopy eyelid and blurry vision on the affected side, along with voice hoarseness can last as long as the local anesthetic effect.  Local anesthetic effect varies, but is typically between 8 and 24 hours.  Mild sensation of shortness of breath may be noted particularly in patients with respiratory disease  Risks: failed block, bleeding, infection, reaction to local anesthetic including seizure and cardiac arrest, spinal block, epidural adeline, collapsed lung, peripheral nerve injury or persistent tingling sensation are all potential risks.  Please discuss any concerns regarding these risks with your anesthesia care provider.  These are most likely to occur at the time of administration of the block.   Additional Recommendations: Please keep the operative arm and elbow well protected and padded for the duration of numbness.  This will prevent unrecognized pressure from being placed on the arm that could result in nerve injury.  Post-operative call: To ensure your safety, you will receive a nursing call 24  hours after surgery.  If you have additional concerns or if you feel that the medications are not wearing off, please inform the nurse or your anesthesia care provider.  Please discuss all concerns regarding this procedure and your anesthetic care with your anesthesia care provider.  The above information is not intended as a substitute for a complete discussion with your anesthesia care provider.  It is intended for your education and to enhance your ability to ask informed questions.    Codman Shoulder Exercises:  Bend over at the waist and let your arm completely relax. Support your weight by leaning on a table or counter.  Swing your arm slowly from side to side.  Repeat this 20 times, four times each day:      Normal findings after surgery:  It is sometimes normal to have pain in the back of the shoulder even before the block wears off in the hand or elbow.  Warmth and swelling about the hand and shoulder is normal for up to 3-4 weeks after surgery.  Numbness around the incisions is normal.  You may have bruising around the incisions and into the bicep area. You may notice this bruising settle into the elbow and forearm.   Low grade fevers less than 100.5 degrees Fahrenheit are normal.     When to call the Office:  [phone]  Temperature greater than 101.5 degrees Fahreheit.  Fever, chills, and increasing pain in the shoulder.  Excessive drainage from the incisions that include bright red blood.  Drainage from the incisions sites that appear yellow, pus-like, or foul smelling.  Increasing pain the shoulder not relieved by the prescribed pain medications or ice.  Persistent nausea or vomiting   Any other effects you feel are significant.    [facility] Adult Discharge Orders & Instructions     For 24 hours after surgery    1. Get plenty of rest.  A responsible adult must stay with you for at least 24 hours after you leave the hospital.   2. Do not drive or use heavy equipment.  If you  have weakness or tingling, don't drive or use heavy equipment until this feeling goes away.  3. Do not drink alcohol.  4. Avoid strenuous or risky activities.  Ask for help when climbing stairs.   5. You may feel lightheaded.  IF so, sit for a few minutes before standing.  Have someone help you get up.   6. If you have nausea (feel sick to your stomach): Drink only clear liquids such as apple juice, ginger ale, broth or 7-Up.  Rest may also help.  Be sure to drink enough fluids.  Move to a regular diet as you feel able.  7. You may have a slight fever. Call the doctor if your fever is over 100 F (37.7 C) (taken under the tongue) or lasts longer than 24 hours.  8. You may have a dry mouth, a sore throat, muscle aches or trouble sleeping.  These should go away after 24 hours.  9. Do not make important or legal decisions.   Call your doctor for any of the followin.  Signs of infection (fever, growing tenderness at the surgery site, a large amount of drainage or bleeding, severe pain, foul-smelling drainage, redness, swelling).    2. It has been over 8 to 10 hours since surgery and you are still not able to urinate (pass water).

## 2017-12-12 NOTE — ANESTHESIA PROCEDURE NOTES
Peripheral nerve/Neuraxial procedure note : interscalene  Pre-Procedure  Performed by  MERARI BARRIOS   Location: post-op      Pre-Anesthestic Checklist: patient identified, IV checked, site marked, risks and benefits discussed, informed consent, monitors and equipment checked, pre-op evaluation, at physician/surgeon's request and post-op pain management    Timeout  Correct Patient: Yes   Correct Procedure: Yes   Correct Site: Yes   Correct Laterality: Yes   Correct Position: Yes   Site Marked: Yes   .   Procedure Documentation    .    Procedure:    Interscalene.     Ultrasound used to identify targeted nerve, plexus, or vascular marker and placed a needle adjacent to it., Ultrasound was used to visualize the spread of the anesthetic in close proximity to the above stated nerve.   Patient Prep;mask, sterile gloves, chlorhexidine gluconate and isopropyl alcohol.  Nerve Stim: Initial Level 0.05 mA.  Lowest motor response 0.02 mA..  Needle: insulated Needle Gauge: 22.  Needle Length (millimeters) 100  Insertion Method: Single Shot.       Assessment/Narrative  Paresthesias: No.  Injection made incrementally with aspirations every 5 mL..  The placement was negative for: blood aspirated, painful injection and site bleeding.  Bolus given via..   Secured via.   Complications: none. Test dose of mL at. Test dose negative for signs of intravascular, subdural or intrathecal injection. Comments:  Block was performed prior to emergence from anesthesia per CHAS Beltrán under the direct supervision of JOHN Barrios CRNA without noted incident.  Pt tolerated the procedure well.  Upon emergence pt rated pain as a 0 on 0-10 pain scale.  No complications noted.  Will follow if needed.

## 2017-12-13 NOTE — OP NOTE
DATE OF PROCEDURE:  12/12/2017      POSTOPERATIVE DIAGNOSIS:  Right shoulder full thickness rotator cuff tendon tear, biceps tenosynovitis, subacromial impingement, acromioclavicular joint arthritis.      POSTOPERATIVE DIAGNOSIS:  Right shoulder retained suture, rotator cuff tear full thickness, subacromial impingement, acromioclavicular joint arthritis, long head biceps tendon partial tear.      PROCEDURES:  Right shoulder arthroscopy with arthroscopic biceps tenodesis, subacromial decompression with partial acromioplasty.  Open right shoulder rotator cuff tendon tear, and distal clavicle excision as well as right shoulder arthroscopy with arthroscopic removal of foreign body (previous suture).      ANESTHESIA:  General with postop scalene block.      SURGEON:  Jose Jones DO      ASSISTANT:   MAGDALENE LEVIN CNP  (utilized throughout the procedure, assisting with instrument passage into and out of the shoulder, and he provided closure.)      ESTIMATED BLOOD LOSS:  Less than 10 mL.      FLUIDS:  2200 mL crystalloid.        COUNTS:  Correct.      DISPOSITION:  PACU in stable condition.      SPECIFICATIONS:  [employer] one 4.5 Healicoil suture anchor and 2 Q-Fix anchors for the biceps tenodesis.      INDICATIONS:  Miguelangel Graham is a pleasant 67-year-old male with a previous history of rotator cuff surgery.  Complaining of right shoulder pain.  Pain has been going on since April.  Progressive with occasional spasm of the rhomboid area.  Describes it as achy.  He attempted rest, activity modification, Advil.  He presents with an MRI which helped to confirm his clinical diagnosis.  Based on his exam and MRI results we discussed shoulder arthroscopy with biceps tenodesis, distal clavicle excision and subacromial decompression with partial acromioplasty.  I discussed the potential for open procedure based on his previous surgery.  The risks and benefits were thoroughly discussed.  Once reviewed, he opted to  proceed.      DETAILS OF PROCEDURE:  He was met preoperatively and again informed consent was verified appropriate extremity was marked.  He was wheeled to operative suite #1.  He was moved to the table with no issues.  When deemed appropriate by Anesthesia, he was positioned in the beach chair position, head remained in neutral through the entirety of the case.  The right upper extremity was then sterilely prepped and draped in a normal manner.  Prior to incision, a timeout was performed.  Again, the appropriate patient, surgery and extremity were verified and antibiotics administered.  A posterior portal was established, the trocar was gradually and easily introduced intra-articular no significant resistance.  The scope was then introduced.  Under direct visualization with an outside-in technique, an anterior portal was established.  In probing the structures the articular surface of the glenoid and humeral head were intact with no significant chondromalacia.  The superior labrum showed some fraying; however, no SLAP tear.  There was some longitudinal fraying of the biceps tendon particularly at end of the sling.  There was some synovitis along the anterior aspect of the shoulder and along the subscapularis.  Subscapularis was carefully probed had some longitudinal splits however, there was no tear at the footprint.  There was a full thickness tear of the supraspinatus tendon along the previous retained suture.  At this time, based on his fraying and his preop exam I performed a tenotomy with the jose.  I then turned my attention  subacromial.  Subacromial there was some significantly thickened scarred bursal tissue.  It was tight for manipulation and movement.  I was able to perform a subacromial decompression.  I was able to use a shaver and performed a partial acromioplasty.  Worked my way anteriorly.  Again, throughout this whole area with some thickened scarred fibrous tissue.  I was able to work down finding  and palpating the bicipital groove.  At this time, there was noted a stitch along the soft tissue of the subscapularis.  It was loose.  This was removed.  Again, the subscapularis tendon was carefully probed showing no tear.  The bicipital groove was opened on the lateral aspect along the transverse ligament.  The biceps tendon was found.  I placed a locking grasper on it to place tension on it.  I then placed a total of 2 Q-Fix anchors with the first one setting the tension, creating a suture loop tying it down nicely.  I then passed the other utilizing the first pass through the biceps tendon.  I then tied this down nicely approximated the tendon.  The remainder of the stump was then removed.  At this time, based on the swelling, I opted to go open.  I removed the scope.  I extended a previous anterolateral portal incision with blunt dissection through the deltoid.  Care was taken not to extend this distally.  Once through the deltoid the rotator cuff tear was easily found.  I then placed 1 Q-Fix anchor.  It had good purchase in the bone and I was able whipstitch part of the tendon and tie it down nicely.  I used a double loaded tendon repeated.  This brought the tendon down nicely to the footprint.  Prior to doing this, I did roughen up the footprint to a bleeding bed of bone utilizing a rongeur.  I then made a separate more anterior incision along the previous portal site extending into the AC joint.  Blunt dissection carried it down to the level of the AC joint.  Protecting the rotator cuff below, I was able to use a saw and do distal clavicle resection approximately 1 cm.  This fragment was then removed.  The areas were copiously irrigated, suctioned dry.  Hemostasis was achieved with the approach.  The subcutaneous tissues closed with 2-0 Vicryl, followed with 4-0 Monocryl closure.  A sterile dressing was applied.  He subsequently transferred to the PACU in stable condition.      He will be discharged home  with oxycodone and Robaxin for pain, followup appointment has been scheduled, discharge instructions provided.         JUANA HAYES DO             D: 2017 16:07   T: 2017 22:15   MT: JAVIER#[id number]      Name:     DIAN GARZA   MRN:      [id number]        Account:        [id number]   :      1950           Procedure Date: 2017      Document: [id number]

## 2017-12-21 ENCOUNTER — OFFICE VISIT (OUTPATIENT)
Dept: ORTHOPEDICS | Facility: CLINIC | Age: 67
End: 2017-12-21
Payer: COMMERCIAL

## 2017-12-21 VITALS — WEIGHT: 228 LBS | TEMPERATURE: 97.1 F | HEIGHT: 71 IN | BODY MASS INDEX: 31.92 KG/M2

## 2017-12-21 DIAGNOSIS — M75.121 COMPLETE TEAR OF RIGHT ROTATOR CUFF: Primary | ICD-10-CM

## 2017-12-21 PROCEDURE — 99024 POSTOP FOLLOW-UP VISIT: CPT | Performed by: ORTHOPAEDIC SURGERY

## 2017-12-21 ASSESSMENT — PAIN SCALES - GENERAL: PAINLEVEL: NO PAIN (0)

## 2017-12-21 NOTE — NURSING NOTE
"Chief Complaint   Patient presents with     Surgical Followup     DOS:12/12/2017~ Right shoulder arthroscopy with arthroscopic biceps tenodesis, subacromial decompression with partial acromioplasty.  Open right shoulder rotator cuff tendon tear, and distal clavicle excision as well as right shoulder arthroscopy with arthroscopic removal of foreign body (previous suture). ~9 days postop       Initial Temp 97.1  F (36.2  C) (Temporal)  Ht 1.791 m (5' 10.5\")  Wt 103.4 kg (228 lb)  BMI 32.25 kg/m2 Estimated body mass index is 32.25 kg/(m^2) as calculated from the following:    Height as of this encounter: 1.791 m (5' 10.5\").    Weight as of this encounter: 103.4 kg (228 lb).  Medication Reconciliation: complete   Marah/ROHINI       "

## 2017-12-21 NOTE — MR AVS SNAPSHOT
"              After Visit Summary   2017    Miguelangel Graham    MRN: [id number]           Patient Information     Date Of Birth          1950        Visit Information        Provider Department      2017 9:00 AM Jose Jones, DO [facility]         Follow-ups after your visit        Who to contact     If you have questions or need follow up information about today's clinic visit or your schedule please contact [facility] directly at [phone].  Normal or non-critical lab and imaging results will be communicated to you by MyChart, letter or phone within 4 business days after the clinic has received the results. If you do not hear from us within 7 days, please contact the clinic through [employer] or phone. If you have a critical or abnormal lab result, we will notify you by phone as soon as possible.  Submit refill requests through [employer] or call your pharmacy and they will forward the refill request to us. Please allow 3 business days for your refill to be completed.          Additional Information About Your Visit        [facility] Information     [employer] lets you send messages to your doctor, view your test results, renew your prescriptions, schedule appointments and more. To sign up, go to [email or web address] . Click on \"Log in\" on the left side of the screen, which will take you to the Welcome page. Then click on \"Sign up Now\" on the right side of the page.     You will be asked to enter the access code listed below, as well as some personal information. Please follow the directions to create your username and password.     Your access code is: [email or web address]  Expires: 2018 12:44 PM     Your access code will  in 90 days. If you need help or a new code, please call your [facility] or [phone].        Care EveryWhere ID     This is your Care EveryWhere ID. This could be used by other organizations to access your [place] medical " "records  [id number]        Your Vitals Were     Temperature Height BMI (Body Mass Index)             97.1  F (36.2  C) (Temporal) 1.791 m (5' 10.5\") 32.25 kg/m2          Blood Pressure from Last 3 Encounters:   12/12/17 130/75   12/01/17 134/80   05/02/13 122/78    Weight from Last 3 Encounters:   12/21/17 103.4 kg (228 lb)   12/12/17 103.4 kg (228 lb)   12/01/17 103.4 kg (228 lb)              Today, you had the following     No orders found for display       Primary Care Provider Fax #    Physician No Ref-Primary [phone]       No address on file        Equal Access to Services     OLIVIER SKY : Dom Courtney, diandar jackman, jonathan kaalmastephania deal, sherwin yoder . So [facility] [phone].    ATENCIÓN: Si habla español, tiene a alicea disposición servicios gratuitos de asistencia lingüística. Llame al [phone].    We comply with applicable federal civil rights laws and [place] laws. We do not discriminate on the basis of race, color, national origin, age, disability, sex, sexual orientation, or gender identity.            Thank you!     Thank you for choosing [facility]  for your care. Our goal is always to provide you with excellent care. Hearing back from our patients is one way we can continue to improve our services. Please take a few minutes to complete the written survey that you may receive in the mail after your visit with us. Thank you!             Your Updated Medication List - Protect others around you: Learn how to safely use, store and throw away your medicines at www.disposemymeds.org.          This list is accurate as of: 12/21/17  9:23 AM.  Always use your most recent med list.                   Brand Name Dispense Instructions for use Diagnosis    methocarbamol 750 MG tablet    ROBAXIN    60 tablet    Take 1 tablet (750 mg) by mouth every 6 hours as needed for muscle spasms (muscle spasm)    Complete tear of right rotator cuff    "    oxyCODONE IR 5 MG tablet    ROXICODONE    70 tablet    Take 1-2 tablets (5-10 mg) by mouth every 3 hours as needed for pain or other (Moderate to Severe)    Complete tear of right rotator cuff

## 2017-12-21 NOTE — PROGRESS NOTES
"Orthopedic Clinic Post-Operative Note    CHIEF COMPLAINT:   Chief Complaint   Patient presents with     Surgical Followup     DOS:12/12/2017~ Right shoulder arthroscopy with arthroscopic biceps tenodesis, subacromial decompression with partial acromioplasty.  Open right shoulder rotator cuff tendon tear, and distal clavicle excision as well as right shoulder arthroscopy with arthroscopic removal of foreign body (previous suture). ~9 days postop       HISTORY OF PRESENT ILLNESS  Stopped his pain medication after the second day.  He has been taking Tylenol.  Pain is been much better than before surgery.  He has been \"coughing up blood\".  Only in the morning.  It has since decreased and only slightly blood-tinged in the morning.  Patient's past medical, surgical, social and family histories reviewed.     Past Medical History:   Diagnosis Date     PONV (postoperative nausea and vomiting) 12/12/2017    shoulder surgery       Past Surgical History:   Procedure Laterality Date     ARTHROSCOPY SHOULDER BICEPS TENODESIS REPAIR Right 12/12/2017    Procedure: ARTHROSCOPY SHOULDER BICEPS TENODESIS REPAIR;;  Surgeon: Jose Jones DO;  Location:  OR     ARTHROSCOPY SHOULDER DECOMPRESSION Right 12/12/2017    Procedure: ARTHROSCOPY SHOULDER DECOMPRESSION;;  Surgeon: Jose Jones DO;  Location:  OR     ARTHROSCOPY SHOULDER DISTAL CLAVICLE REPAIR Right 12/12/2017    Procedure: ARTHROSCOPY SHOULDER DISTAL CLAVICLE RESECTION;;  Surgeon: Jose Jones DO;  Location:  OR     ARTHROSCOPY SHOULDER, OPEN ROTATOR CUFF REPAIR, COMBINED Right 12/12/2017    Procedure: COMBINED ARTHROSCOPY SHOULDER, OPEN ROTATOR CUFF REPAIR;  right shoulder arthroscopy with open mini rotator cuff repair, biceps tenodesis, distal clavicle and subacromial decompression with partial acromioplasty and removal of foreing body;  Surgeon: Jose Jones DO;  Location:  OR      KNEE SCOPE, DIAGNOSTIC      " "Arthroscopy, Knee     HC REPAIR ROTATOR CUFF,CHRONIC       REMOVE FOREIGN BODY UPPER EXTREMITY Right 12/12/2017    Procedure: REMOVE FOREIGN BODY UPPER EXTREMITY;;  Surgeon: Jose Jones DO;  Location: [facility]       Medications:    Current Outpatient Prescriptions on File Prior to Visit:  methocarbamol (ROBAXIN) 750 MG tablet Take 1 tablet (750 mg) by mouth every 6 hours as needed for muscle spasms (muscle spasm)   oxyCODONE IR (ROXICODONE) 5 MG tablet Take 1-2 tablets (5-10 mg) by mouth every 3 hours as needed for pain or other (Moderate to Severe)     No current facility-administered medications on file prior to visit.     Allergies   Allergen Reactions     No Known Drug Allergies        Social History     Occupational History           Social History Main Topics     Smoking status: Former Smoker     Smokeless tobacco: Never Used     Alcohol use Yes      Comment: daily      Drug use: No     Sexual activity: Not on file       Family History   Problem Relation Age of Onset     C.A.RHETT Brother      ANTELMOARADHA. Brother      ANTELMOAROMI Brother      TOÑITO Brother      TOÑITO Brother      DIABETES Sister      DIABETES Sister      DIABETES Brother      DIABETES Brother      DIABETES Brother        REVIEW OF SYSTEMS  General: negative for, night sweats, dizziness, fatigue  Resp: No shortness of breath and no cough  CV: negative for chest pain, syncope or near-syncope  GI: negative for nausea, vomiting and diarrhea  : negative for dysuria and hematuria  Musculoskeletal: as above  Neurologic: negative for syncope   Hematologic: negative for bleeding disorder    Physical Exam:  Vitals: Temp 97.1  F (36.2  C) (Temporal)  Ht 5' 10.5\" (1.791 m)  Wt 228 lb (103.4 kg)  BMI 32.25 kg/m2  BMI= Body mass index is 32.25 kg/(m^2).  Constitutional: healthy, alert and no acute distress   Psychiatric: mentation appears normal and affect normal/bright  NEURO: no focal deficits  SKIN: .well healed, no erythema, no " "incision breakdown and no drainage.  JOINT/EXTREMITIES: Ecchymosis and swelling along the upper arm and into the anterior chest.  Distal neurovascular intact.  No significant peripheral edema.  No gross deformity.  GAIT: not tested     Diagnostic Modalities:  None today.  Independent visualization of the images was performed.      Impression:   Chief Complaint   Patient presents with     Surgical Followup     DOS:12/12/2017~ Right shoulder arthroscopy with arthroscopic biceps tenodesis, subacromial decompression with partial acromioplasty.  Open right shoulder rotator cuff tendon tear, and distal clavicle excision as well as right shoulder arthroscopy with arthroscopic removal of foreign body (previous suture). ~9 days postop   Doing well.  Arthroscopy pictures discussed.    Plan:   Activity: No use right arm  Staples/Sutures out: Yes.  Pain controlled: Yes. Continue to use: Tylenol  Immobilzation: Yes, Sling for an additional 4 weeks  Physical Therapy: Start in 2 weeks with medium rotator cuff and bicep tenodesis protocols  Rest, Ice  Return to clinic 6, week(s), or sooner as needed for changes.  We discussed him \"splitting of blood\".  Probable irritation related to the tube.  It has decreased significantly.  I have instructed him to call me if it is not gone in 1 week.  Plan to send him to ENT versus primary if continued.  Re-x-ray on return: No    Bhupendra Jones D.O.  "

## 2017-12-21 NOTE — LETTER
"    12/21/2017         RE: Miguelangel Graham  [address] [facility] [address]        Dear Colleague,    Thank you for referring your patient, Miguelangel Graham, to the [facility]. Please see a copy of my visit note below.    Orthopedic Clinic Post-Operative Note    CHIEF COMPLAINT:   Chief Complaint   Patient presents with     Surgical Followup     DOS:12/12/2017~ Right shoulder arthroscopy with arthroscopic biceps tenodesis, subacromial decompression with partial acromioplasty.  Open right shoulder rotator cuff tendon tear, and distal clavicle excision as well as right shoulder arthroscopy with arthroscopic removal of foreign body (previous suture). ~9 days postop       HISTORY OF PRESENT ILLNESS  Stopped his pain medication after the second day.  He has been taking Tylenol.  Pain is been much better than before surgery.  He has been \"coughing up blood\".  Only in the morning.  It has since decreased and only slightly blood-tinged in the morning.  Patient's past medical, surgical, social and family histories reviewed.     Past Medical History:   Diagnosis Date     PONV (postoperative nausea and vomiting) 12/12/2017    shoulder surgery       Past Surgical History:   Procedure Laterality Date     ARTHROSCOPY SHOULDER BICEPS TENODESIS REPAIR Right 12/12/2017    Procedure: ARTHROSCOPY SHOULDER BICEPS TENODESIS REPAIR;;  Surgeon: Jose Jones DO;  Location[facility]     ARTHROSCOPY SHOULDER DECOMPRESSION Right 12/12/2017    Procedure: ARTHROSCOPY SHOULDER DECOMPRESSION;;  Surgeon: Jose Jones DO;  Location: [facility]     ARTHROSCOPY SHOULDER DISTAL CLAVICLE REPAIR Right 12/12/2017    Procedure: ARTHROSCOPY SHOULDER DISTAL CLAVICLE RESECTION;;  Surgeon: Jose Jones DO;  Location: [facility]     ARTHROSCOPY SHOULDER, OPEN ROTATOR CUFF REPAIR, COMBINED Right 12/12/2017    Procedure: COMBINED ARTHROSCOPY SHOULDER, OPEN ROTATOR CUFF REPAIR;  right shoulder arthroscopy with open " "mini rotator cuff repair, biceps tenodesis, distal clavicle and subacromial decompression with partial acromioplasty and removal of foreing body;  Surgeon: Jose Jones DO;  Location: [facility]     HC KNEE SCOPE, DIAGNOSTIC      Arthroscopy, Knee     HC REPAIR ROTATOR CUFF,CHRONIC       REMOVE FOREIGN BODY UPPER EXTREMITY Right 12/12/2017    Procedure: REMOVE FOREIGN BODY UPPER EXTREMITY;;  Surgeon: Jose Jones DO;  Location: [facility]       Medications:    Current Outpatient Prescriptions on File Prior to Visit:  methocarbamol (ROBAXIN) 750 MG tablet Take 1 tablet (750 mg) by mouth every 6 hours as needed for muscle spasms (muscle spasm)   oxyCODONE IR (ROXICODONE) 5 MG tablet Take 1-2 tablets (5-10 mg) by mouth every 3 hours as needed for pain or other (Moderate to Severe)     No current facility-administered medications on file prior to visit.     Allergies   Allergen Reactions     No Known Drug Allergies        Social History     Occupational History           Social History Main Topics     Smoking status: Former Smoker     Smokeless tobacco: Never Used     Alcohol use Yes      Comment: daily      Drug use: No     Sexual activity: Not on file       Family History   Problem Relation Age of Onset     C.A.D. Brother      C.A.SAHARA. Brother      C.ARADHA. Brother      C.AROMI Brother      C.A.SAHARA. Brother      DIABETES Sister      DIABETES Sister      DIABETES Brother      DIABETES Brother      DIABETES Brother        REVIEW OF SYSTEMS  General: negative for, night sweats, dizziness, fatigue  Resp: No shortness of breath and no cough  CV: negative for chest pain, syncope or near-syncope  GI: negative for nausea, vomiting and diarrhea  : negative for dysuria and hematuria  Musculoskeletal: as above  Neurologic: negative for syncope   Hematologic: negative for bleeding disorder    Physical Exam:  Vitals: Temp 97.1  F (36.2  C) (Temporal)  Ht 5' 10.5\" (1.791 m)  Wt 228 lb (103.4 kg)  BMI " "32.25 kg/m2  BMI= Body mass index is 32.25 kg/(m^2).  Constitutional: healthy, alert and no acute distress   Psychiatric: mentation appears normal and affect normal/bright  NEURO: no focal deficits  SKIN: .well healed, no erythema, no incision breakdown and no drainage.  JOINT/EXTREMITIES: Ecchymosis and swelling along the upper arm and into the anterior chest.  Distal neurovascular intact.  No significant peripheral edema.  No gross deformity.  GAIT: not tested     Diagnostic Modalities:  None today.  Independent visualization of the images was performed.      Impression:   Chief Complaint   Patient presents with     Surgical Followup     DOS:12/12/2017~ Right shoulder arthroscopy with arthroscopic biceps tenodesis, subacromial decompression with partial acromioplasty.  Open right shoulder rotator cuff tendon tear, and distal clavicle excision as well as right shoulder arthroscopy with arthroscopic removal of foreign body (previous suture). ~9 days postop   Doing well.  Arthroscopy pictures discussed.    Plan:   Activity: No use right arm  Staples/Sutures out: Yes.  Pain controlled: Yes. Continue to use: Tylenol  Immobilzation: Yes, Sling for an additional 4 weeks  Physical Therapy: Start in 2 weeks with medium rotator cuff and bicep tenodesis protocols  Rest, Ice  Return to clinic 6, week(s), or sooner as needed for changes.  We discussed him \"splitting of blood\".  Probable irritation related to the tube.  It has decreased significantly.  I have instructed him to call me if it is not gone in 1 week.  Plan to send him to ENT versus primary if continued.  Re-x-ray on return: No    Bhupendra Jones D.O.    Again, thank you for allowing me to participate in the care of your patient.        Sincerely,        Jose Jones, DO    "

## 2018-01-03 ENCOUNTER — HOSPITAL ENCOUNTER (OUTPATIENT)
Dept: PHYSICAL THERAPY | Facility: CLINIC | Age: 68
Setting detail: THERAPIES SERIES
End: 2018-01-03
Attending: ORTHOPAEDIC SURGERY
Payer: MEDICARE

## 2018-01-03 PROCEDURE — 40000718 ZZHC STATISTIC PT DEPARTMENT ORTHO VISIT: Performed by: PHYSICAL THERAPIST

## 2018-01-03 PROCEDURE — 97161 PT EVAL LOW COMPLEX 20 MIN: CPT | Mod: GP | Performed by: PHYSICAL THERAPIST

## 2018-01-03 PROCEDURE — G8984 CARRY CURRENT STATUS: HCPCS | Mod: GP,CL | Performed by: PHYSICAL THERAPIST

## 2018-01-03 PROCEDURE — G8985 CARRY GOAL STATUS: HCPCS | Mod: GP,CH | Performed by: PHYSICAL THERAPIST

## 2018-01-03 PROCEDURE — 97110 THERAPEUTIC EXERCISES: CPT | Mod: GP | Performed by: PHYSICAL THERAPIST

## 2018-01-05 ENCOUNTER — HOSPITAL ENCOUNTER (OUTPATIENT)
Dept: PHYSICAL THERAPY | Facility: CLINIC | Age: 68
Setting detail: THERAPIES SERIES
End: 2018-01-05
Attending: ORTHOPAEDIC SURGERY
Payer: MEDICARE

## 2018-01-05 PROCEDURE — 97140 MANUAL THERAPY 1/> REGIONS: CPT | Mod: GP | Performed by: PHYSICAL THERAPIST

## 2018-01-05 PROCEDURE — 40000718 ZZHC STATISTIC PT DEPARTMENT ORTHO VISIT: Performed by: PHYSICAL THERAPIST

## 2018-01-05 PROCEDURE — 97110 THERAPEUTIC EXERCISES: CPT | Mod: GP | Performed by: PHYSICAL THERAPIST

## 2018-01-05 NOTE — PROGRESS NOTES
01/03/18 1500   General Information   Type of Visit Initial OP Ortho PT Evaluation   Start of Care Date 01/03/18   Referring Physician Dr. Jose Jones   Patient/Family Goals Statement Get back to normal   Orders Evaluate and Treat   Orders Comment RTC medium, biceps tenodesis   Date of Order 12/21/17   Insurance Type Medicare;[employer]   Medical Diagnosis Complete tear of right rotator cuff   Surgical/Medical history reviewed Yes   Precautions/Limitations no known precautions/limitations   Weight-Bearing Status - LUE nonweight-bearing   Weight-Bearing Status - RUE full weight-bearing   Weight-Bearing Status - LLE full weight-bearing   Weight-Bearing Status - RLE full weight-bearing   General Information Comments PMH: 1999 R RTC repair on the same shoulder, 2004 L RTC repair; R knee surgery decompression a couple times       Present No   Body Part(s)   Body Part(s) Shoulder   Presentation and Etiology   Pertinent history of current problem (include personal factors and/or comorbidities that impact the POC) Underwent RTC repair and tenodesis to the R shoulder on 12/12/17.  Pt has not moved his shoulder since surgery.  Wearing the sling all the time.  Does codmans, elbow, and wrist exercises.  Pt states that he has been doing everything with the L UE at this time.  He has been sleeping in the recliner.    Impairments A. Pain;C. Swelling;D. Decreased ROM;F. Decreased strength and endurance   Functional Limitations perform activities of daily living;perform desired leisure / sports activities   Symptom Location Deltoid region.   How/Where did it occur With repetition/overuse   Onset date of current episode/exacerbation 12/12/17   Chronicity New   Pain rating (0-10 point scale) Best (/10);Worst (/10)   Best (/10) 0/10   Worst (/10) 4/10   Pain quality B. Dull;C. Aching   Frequency of pain/symptoms B. Intermittent   Pain/symptoms are: Worse during the day   Pain/symptoms exacerbated by C. Lifting;D.  Carrying;G. Certain positions;H. Overhead reach;J. ADL;K. Home tasks   Pain/symptoms eased by I. OTC medication(s)  (Sling)   Progression of symptoms since onset: Improved   Prior Level of Function   Prior Level of Function-Mobility Independent   Prior Level of Function-ADLs Independent   Current Level of Function   Current Community Support Family/friend caregiver   Patient role/employment history F. Retired   Living environment House/townhome   Current equipment-Gait/Locomotion None   Current equipment-ADL None   Fall Risk Screen   Fall screen completed by PT   Have you fallen 2 or more times in the past year? No   Have you fallen and had an injury in the past year? No   Is patient a fall risk? No   Shoulder Objective Findings   Side (if bilateral, select both right and left) Right   Observation No acute   Integumentary  Swelling around incision site.   Posture Forward head, round shoulder   Cervical Screen (ROM, quadrant) WNL   Thoracic Mobility Screen WNL   Shoulder Special Tests Comments Did not assess as pt is post operative.   Palpation Tenderness to the R shoulder tissue and joint line.   Accessory Motion/Joint Mobility Assessed gentle joint play, normal at this time for available range.   Right Shoulder Flexion AROM NT   Right Shoulder Flexion PROM 94 degrees   Right Shoulder Abduction AROM NT   Right Shoulder Abduction PROM 88 degrees   Right Shoulder ER AROM NT   Right Shoulder ER PROM 10 degrees   Right Shoulder IR AROM NT   Right Shoulder IR PROM To belly   Right Shoulder Flexion Strength NT   Right Shoulder Abduction Strength NT   Right Shoulder ER Strength NT   Right Shoulder IR Strength NT   Right Shoulder Extension Strength NT   Planned Therapy Interventions   Planned Therapy Interventions joint mobilization;manual therapy;neuromuscular re-education;ROM;strengthening;stretching   Planned Modality Interventions   Planned Modality Interventions Comments As needed for symptom relief.   Clinical  Impression   Criteria for Skilled Therapeutic Interventions Met yes, treatment indicated   PT Diagnosis Decreased ROM, strength, and mobility secondary to post operative R RTC repair.   Influenced by the following impairments Pain, weakness   Functional limitations due to impairments Reaching, ADLs, house activities   Clinical Presentation Evolving/Changing   Clinical Presentation Rationale Pt is a 67 year old male post op 4 weeks R RTC and bicep tenodesis repair.  Pt demonstrates with difficulties of ROM, strength, posture, and stability.  Pt is unable to use the R UE at this time for ADLs and IADLs. Pt has a history of R and L shoulder surgeries which may play a factor in outcome of plan of care due to instability and weakness to shoulder and scapular regions.  Pt will benefit from skilled PT services to address impairments and return pt to normal function.   Clinical Decision Making (Complexity) Moderate complexity   Therapy Frequency 2 times/Week   Predicted Duration of Therapy Intervention (days/wks) 8 weeks   Risk & Benefits of therapy have been explained Yes   Patient, Family & other staff in agreement with plan of care Yes   Education Assessment   Preferred Learning Style Listening;Reading;Demonstration;Pictures/video   Barriers to Learning No barriers   ORTHO GOALS   PT Ortho Eval Goals 1;2;3;4   Ortho Goal 1   Goal Identifier #1   Goal Description Pt will demonstrate normal PROM of the R shoulder of >160 degrees for flexion and abduction in order to progress to AROM activities.   Target Date 02/02/18   Ortho Goal 2   Goal Identifier #2   Goal Description Pt will demonstrate AROM of the R shoulder flexion and abduction >160 degrees in order to complete overhead activities such as washing hair and putting dishes away.   Target Date 02/17/18   Ortho Goal 3   Goal Identifier #3   Goal Description Pt will demonstrate ability to sleep for 6 hours in bed without waking to R shoulder pain in order to have restful  night of sleep.   Target Date 02/17/18   Ortho Goal 4   Goal Identifier #4   Goal Description Pt will demonstrate ability to don and doff his jacket and clothes with <2/10 R shoulder pain in order to be independent with dressing.   Target Date 03/04/18   Total Evaluation Time   Total Evaluation Time 30   Therapy Certification   Certification date from 01/03/18   Certification date to 03/04/18   Medical Diagnosis Complete tear of right rotator cuff     Thank you for your referral.    Kathie Palm, PT, DPT  [facility]  [phone]

## 2018-01-05 NOTE — PROGRESS NOTES
[facility]    OUTPATIENT PHYSICAL THERAPY ORTHOPEDIC EVALUATION  PLAN OF TREATMENT FOR OUTPATIENT REHABILITATION  (COMPLETE FOR INITIAL CLAIMS ONLY)  Patient's Last Name, First Name, M.I.  YOB: 1950  GladysMiguelangel  TAMMI    Provider s Name:  [facility]   Medical Record No.  [id number]   Start of Care Date:  01/03/18   Onset Date:  12/12/17   Type:     _X__PT   ___OT   ___SLP Medical Diagnosis:  Complete tear of right rotator cuff     PT Diagnosis:  Decreased ROM, strength, and mobility secondary to post operative R RTC repair.   Visits from SOC:  1      _________________________________________________________________________________  Plan of Treatment/Functional Goals:  joint mobilization, manual therapy, neuromuscular re-education, ROM, strengthening, stretching        As needed for symptom relief.  Goals  Goal Identifier: #1  Goal Description: Pt will demonstrate normal PROM of the R shoulder of >160 degrees for flexion and abduction in order to progress to AROM activities.  Target Date: 02/02/18    Goal Identifier: #2  Goal Description: Pt will demonstrate AROM of the R shoulder flexion and abduction >160 degrees in order to complete overhead activities such as washing hair and putting dishes away.  Target Date: 02/17/18    Goal Identifier: #3  Goal Description: Pt will demonstrate ability to sleep for 6 hours in bed without waking to R shoulder pain in order to have restful night of sleep.  Target Date: 02/17/18    Goal Identifier: #4  Goal Description: Pt will demonstrate ability to don and doff his jacket and clothes with <2/10 R shoulder pain in order to be independent with dressing.  Target Date: 03/04/18    Therapy Frequency:  2 times/Week  Predicted Duration of Therapy Intervention:  8 weeks    Kathie Palm, PT                 I CERTIFY THE NEED FOR THESE SERVICES FURNISHED UNDER        THIS PLAN OF TREATMENT AND WHILE UNDER MY CARE     (Physician  co-signature of this document indicates review and certification of the therapy plan).                     Certification Date From:  01/03/18   Certification Date To:  03/04/18    Referring Provider:  Dr. Jose Jones    Initial Assessment        See Epic Evaluation Start of Care Date: 01/03/18             Thank you for your referral.    Kathie Palm, PT, DPT  [facility]  [phone]

## 2018-01-09 ENCOUNTER — HOSPITAL ENCOUNTER (OUTPATIENT)
Dept: PHYSICAL THERAPY | Facility: CLINIC | Age: 68
Setting detail: THERAPIES SERIES
End: 2018-01-09
Attending: ORTHOPAEDIC SURGERY
Payer: MEDICARE

## 2018-01-09 PROCEDURE — 97140 MANUAL THERAPY 1/> REGIONS: CPT | Mod: GP | Performed by: PHYSICAL THERAPIST

## 2018-01-09 PROCEDURE — 40000718 ZZHC STATISTIC PT DEPARTMENT ORTHO VISIT: Performed by: PHYSICAL THERAPIST

## 2018-01-12 ENCOUNTER — HOSPITAL ENCOUNTER (OUTPATIENT)
Dept: PHYSICAL THERAPY | Facility: CLINIC | Age: 68
Setting detail: THERAPIES SERIES
End: 2018-01-12
Attending: ORTHOPAEDIC SURGERY
Payer: MEDICARE

## 2018-01-12 PROCEDURE — 97140 MANUAL THERAPY 1/> REGIONS: CPT | Mod: GP | Performed by: PHYSICAL THERAPIST

## 2018-01-12 PROCEDURE — 40000718 ZZHC STATISTIC PT DEPARTMENT ORTHO VISIT: Performed by: PHYSICAL THERAPIST

## 2018-01-12 PROCEDURE — 97110 THERAPEUTIC EXERCISES: CPT | Mod: GP | Performed by: PHYSICAL THERAPIST

## 2018-01-15 ENCOUNTER — HOSPITAL ENCOUNTER (OUTPATIENT)
Dept: PHYSICAL THERAPY | Facility: CLINIC | Age: 68
Setting detail: THERAPIES SERIES
End: 2018-01-15
Attending: ORTHOPAEDIC SURGERY
Payer: MEDICARE

## 2018-01-15 PROCEDURE — 40000718 ZZHC STATISTIC PT DEPARTMENT ORTHO VISIT: Performed by: PHYSICAL THERAPIST

## 2018-01-15 PROCEDURE — 97110 THERAPEUTIC EXERCISES: CPT | Mod: GP | Performed by: PHYSICAL THERAPIST

## 2018-01-15 PROCEDURE — 97140 MANUAL THERAPY 1/> REGIONS: CPT | Mod: GP | Performed by: PHYSICAL THERAPIST

## 2018-01-17 ENCOUNTER — HOSPITAL ENCOUNTER (OUTPATIENT)
Dept: PHYSICAL THERAPY | Facility: CLINIC | Age: 68
Setting detail: THERAPIES SERIES
End: 2018-01-17
Attending: ORTHOPAEDIC SURGERY
Payer: MEDICARE

## 2018-01-17 PROCEDURE — 97110 THERAPEUTIC EXERCISES: CPT | Mod: GP | Performed by: PHYSICAL THERAPIST

## 2018-01-17 PROCEDURE — 40000718 ZZHC STATISTIC PT DEPARTMENT ORTHO VISIT: Performed by: PHYSICAL THERAPIST

## 2018-01-17 PROCEDURE — 97140 MANUAL THERAPY 1/> REGIONS: CPT | Mod: GP | Performed by: PHYSICAL THERAPIST

## 2018-01-22 ENCOUNTER — OFFICE VISIT (OUTPATIENT)
Dept: ORTHOPEDICS | Facility: OTHER | Age: 68
End: 2018-01-22
Payer: COMMERCIAL

## 2018-01-22 ENCOUNTER — HOSPITAL ENCOUNTER (OUTPATIENT)
Dept: PHYSICAL THERAPY | Facility: CLINIC | Age: 68
Setting detail: THERAPIES SERIES
End: 2018-01-22
Attending: ORTHOPAEDIC SURGERY
Payer: MEDICARE

## 2018-01-22 ENCOUNTER — RADIANT APPOINTMENT (OUTPATIENT)
Dept: GENERAL RADIOLOGY | Facility: OTHER | Age: 68
End: 2018-01-22
Attending: ORTHOPAEDIC SURGERY
Payer: COMMERCIAL

## 2018-01-22 VITALS — BODY MASS INDEX: 31.78 KG/M2 | WEIGHT: 227 LBS | HEIGHT: 71 IN | TEMPERATURE: 96.6 F

## 2018-01-22 DIAGNOSIS — M16.11 PRIMARY OSTEOARTHRITIS OF RIGHT HIP: Primary | ICD-10-CM

## 2018-01-22 DIAGNOSIS — M25.551 HIP PAIN, RIGHT: ICD-10-CM

## 2018-01-22 PROCEDURE — 99213 OFFICE O/P EST LOW 20 MIN: CPT | Mod: 24 | Performed by: ORTHOPAEDIC SURGERY

## 2018-01-22 PROCEDURE — 40000718 ZZHC STATISTIC PT DEPARTMENT ORTHO VISIT: Performed by: PHYSICAL THERAPIST

## 2018-01-22 PROCEDURE — 97140 MANUAL THERAPY 1/> REGIONS: CPT | Mod: GP | Performed by: PHYSICAL THERAPIST

## 2018-01-22 PROCEDURE — 73502 X-RAY EXAM HIP UNI 2-3 VIEWS: CPT

## 2018-01-22 PROCEDURE — 97110 THERAPEUTIC EXERCISES: CPT | Mod: GP | Performed by: PHYSICAL THERAPIST

## 2018-01-22 ASSESSMENT — PAIN SCALES - GENERAL: PAINLEVEL: MILD PAIN (2)

## 2018-01-22 NOTE — NURSING NOTE
"Chief Complaint   Patient presents with     Musculoskeletal Problem     right hip pain since 12/12/2017     Consult       Initial Temp 96.6  F (35.9  C) (Temporal)  Ht 1.803 m (5' 11\")  Wt 103 kg (227 lb)  BMI 31.66 kg/m2 Estimated body mass index is 31.66 kg/(m^2) as calculated from the following:    Height as of this encounter: 1.803 m (5' 11\").    Weight as of this encounter: 103 kg (227 lb).  Medication Reconciliation: complete   Marah/ROHINI     "

## 2018-01-22 NOTE — LETTER
"    1/22/2018         RE: Miguelangel Graham  [address] [facility] [address]        Dear Colleague,    Thank you for referring your patient, Miguelangel Graham, to the [facility]. Please see a copy of my visit note below.    Office Visit-Follow up    Chief Complaint: Miguelangel Graham is a 67 year old male who is being seen for   Chief Complaint   Patient presents with     Musculoskeletal Problem     right hip pain since 12/12/2017     Consult       History of Present Illness:   Complains of right hip pain since December 12, 2017.  This is approximately 3 days after shoulder surgery.  Denies any specific injuries or traumas.  Complaints of deep lateral hip pain radiating down his thigh into his knee at times.  He also has calf pain.  History of previous back and \"sciatic issues\".  No peripheral numbness and tingling.  Taking Advil.  Overall the pain is better when he walks.      REVIEW OF SYSTEMS  General: negative for, night sweats, dizziness, fatigue  Resp: No shortness of breath and no cough  CV: negative for chest pain, syncope or near-syncope  GI: negative for nausea, vomiting and diarrhea  : negative for dysuria and hematuria  Musculoskeletal: as above  Neurologic: negative for syncope   Hematologic: negative for bleeding disorder    Physical Exam:  Vitals: Temp 96.6  F (35.9  C) (Temporal)  Ht 5' 11\" (1.803 m)  Wt 227 lb (103 kg)  BMI 31.66 kg/m2  BMI= Body mass index is 31.66 kg/(m^2).  Constitutional: healthy, alert and no acute distress   Psychiatric: mentation appears normal and affect normal/bright  NEURO: no focal deficits  RESP: Normal with easy respirations and no use of accessory muscles to breathe, no audible wheezing or retractions  CV: RLE: no edema         SKIN: No erythema, rashes, excoriation, or breakdown. No evidence of infection.   JOINT/EXTREMITIES:right hip: Diminished internal rotation.  Pain is re-created with internal rotation to the thigh and lateral hip.  Straight " leg reproduces pain into the calf.  He has no peripheral numbness.  No focal areas of tenderness.  No deformity or atrophy.  GAIT: not tested             Diagnostic Modalities:  right hip X-ray: No fractures or dislocations.  Underlying decreased head neck offset consistent with SUHAS with decreased joint space.  Independent visualization of the images was performed.      Impression: right hip primary osteoarthritis with possible component of lumbar radiculopathy    Plan:  All of the above pertinent physical exam and imaging modalities findings was reviewed with Miguelangel.    Options discussed.  We reviewed his symptoms.  The focus of his pain appears to be along the hip.  Recommend trying a deep right hip intra-articular steroid injection by radiology under fluoroscopy.  We also discussed his back-this may be contributing to his pain.  We briefly reviewed a oral steroid pack.  At this point he would like to attempt the hip injection first.      Return to clinic 2, week(s), or sooner as needed for changes.  Re-x-ray on return: No    Bhupendra Jones D.O.          Again, thank you for allowing me to participate in the care of your patient.        Sincerely,        Jose Jones, DO

## 2018-01-22 NOTE — PROGRESS NOTES
"Office Visit-Follow up    Chief Complaint: Miguelangel Graham is a 67 year old male who is being seen for   Chief Complaint   Patient presents with     Musculoskeletal Problem     right hip pain since 12/12/2017     Consult       History of Present Illness:   Complains of right hip pain since December 12, 2017.  This is approximately 3 days after shoulder surgery.  Denies any specific injuries or traumas.  Complaints of deep lateral hip pain radiating down his thigh into his knee at times.  He also has calf pain.  History of previous back and \"sciatic issues\".  No peripheral numbness and tingling.  Taking Advil.  Overall the pain is better when he walks.      REVIEW OF SYSTEMS  General: negative for, night sweats, dizziness, fatigue  Resp: No shortness of breath and no cough  CV: negative for chest pain, syncope or near-syncope  GI: negative for nausea, vomiting and diarrhea  : negative for dysuria and hematuria  Musculoskeletal: as above  Neurologic: negative for syncope   Hematologic: negative for bleeding disorder    Physical Exam:  Vitals: Temp 96.6  F (35.9  C) (Temporal)  Ht 5' 11\" (1.803 m)  Wt 227 lb (103 kg)  BMI 31.66 kg/m2  BMI= Body mass index is 31.66 kg/(m^2).  Constitutional: healthy, alert and no acute distress   Psychiatric: mentation appears normal and affect normal/bright  NEURO: no focal deficits  RESP: Normal with easy respirations and no use of accessory muscles to breathe, no audible wheezing or retractions  CV: RLE: no edema         SKIN: No erythema, rashes, excoriation, or breakdown. No evidence of infection.   JOINT/EXTREMITIES:right hip: Diminished internal rotation.  Pain is re-created with internal rotation to the thigh and lateral hip.  Straight leg reproduces pain into the calf.  He has no peripheral numbness.  No focal areas of tenderness.  No deformity or atrophy.  GAIT: not tested             Diagnostic Modalities:  right hip X-ray: No fractures or dislocations.  Underlying " decreased head neck offset consistent with SUHAS with decreased joint space.  Independent visualization of the images was performed.      Impression: right hip primary osteoarthritis with possible component of lumbar radiculopathy    Plan:  All of the above pertinent physical exam and imaging modalities findings was reviewed with Miguelangel.    Options discussed.  We reviewed his symptoms.  The focus of his pain appears to be along the hip.  Recommend trying a deep right hip intra-articular steroid injection by radiology under fluoroscopy.  We also discussed his back-this may be contributing to his pain.  We briefly reviewed a oral steroid pack.  At this point he would like to attempt the hip injection first.      Return to clinic 2, week(s), or sooner as needed for changes.  Re-x-ray on return: No    Bhupendra Jones D.O.

## 2018-01-23 ENCOUNTER — HOSPITAL ENCOUNTER (OUTPATIENT)
Dept: GENERAL RADIOLOGY | Facility: CLINIC | Age: 68
Discharge: HOME | End: 2018-01-23
Attending: ORTHOPAEDIC SURGERY | Admitting: ORTHOPAEDIC SURGERY
Payer: MEDICARE

## 2018-01-23 DIAGNOSIS — M16.11 PRIMARY OSTEOARTHRITIS OF RIGHT HIP: ICD-10-CM

## 2018-01-23 LAB
BACTERIA SPEC CULT: NORMAL
BACTERIA SPEC CULT: NORMAL
GRAM STN SPEC: NORMAL
SPECIMEN SOURCE: NORMAL
SPECIMEN SOURCE: NORMAL

## 2018-01-23 PROCEDURE — 87070 CULTURE OTHR SPECIMN AEROBIC: CPT | Performed by: ORTHOPAEDIC SURGERY

## 2018-01-23 PROCEDURE — 25000128 H RX IP 250 OP 636: Performed by: RADIOLOGY

## 2018-01-23 PROCEDURE — 25000125 ZZHC RX 250: Performed by: RADIOLOGY

## 2018-01-23 PROCEDURE — 87205 SMEAR GRAM STAIN: CPT | Performed by: ORTHOPAEDIC SURGERY

## 2018-01-23 PROCEDURE — 87075 CULTR BACTERIA EXCEPT BLOOD: CPT | Performed by: ORTHOPAEDIC SURGERY

## 2018-01-23 PROCEDURE — 25500064 ZZH RX 255 OP 636: Performed by: RADIOLOGY

## 2018-01-23 PROCEDURE — 77002 NEEDLE LOCALIZATION BY XRAY: CPT | Mod: GA

## 2018-01-23 PROCEDURE — 87015 SPECIMEN INFECT AGNT CONCNTJ: CPT | Performed by: ORTHOPAEDIC SURGERY

## 2018-01-23 RX ORDER — TRIAMCINOLONE ACETONIDE 40 MG/ML
40 INJECTION, SUSPENSION INTRA-ARTICULAR; INTRAMUSCULAR ONCE
Status: COMPLETED | OUTPATIENT
Start: 2018-01-23 | End: 2018-01-23

## 2018-01-23 RX ORDER — BUPIVACAINE HYDROCHLORIDE 2.5 MG/ML
10 INJECTION, SOLUTION EPIDURAL; INFILTRATION; INTRACAUDAL ONCE
Status: COMPLETED | OUTPATIENT
Start: 2018-01-23 | End: 2018-01-23

## 2018-01-23 RX ORDER — LIDOCAINE HYDROCHLORIDE 10 MG/ML
10 INJECTION, SOLUTION EPIDURAL; INFILTRATION; INTRACAUDAL; PERINEURAL ONCE
Status: COMPLETED | OUTPATIENT
Start: 2018-01-23 | End: 2018-01-23

## 2018-01-23 RX ORDER — IOPAMIDOL 408 MG/ML
50 INJECTION, SOLUTION INTRAVASCULAR ONCE
Status: COMPLETED | OUTPATIENT
Start: 2018-01-23 | End: 2018-01-23

## 2018-01-23 RX ADMIN — LIDOCAINE HYDROCHLORIDE 1 ML: 10 INJECTION, SOLUTION EPIDURAL; INFILTRATION; INTRACAUDAL; PERINEURAL at 08:43

## 2018-01-23 RX ADMIN — TRIAMCINOLONE ACETONIDE 40 MG: 40 INJECTION, SUSPENSION INTRA-ARTICULAR; INTRAMUSCULAR at 08:53

## 2018-01-23 RX ADMIN — BUPIVACAINE HYDROCHLORIDE 4 ML: 2.5 INJECTION, SOLUTION EPIDURAL; INFILTRATION; INTRACAUDAL at 08:53

## 2018-01-23 RX ADMIN — IOPAMIDOL 1 ML: 408 INJECTION, SOLUTION INTRAVASCULAR at 08:43

## 2018-01-24 ENCOUNTER — HOSPITAL ENCOUNTER (OUTPATIENT)
Dept: PHYSICAL THERAPY | Facility: CLINIC | Age: 68
Setting detail: THERAPIES SERIES
End: 2018-01-24
Attending: ORTHOPAEDIC SURGERY
Payer: MEDICARE

## 2018-01-24 PROCEDURE — 40000718 ZZHC STATISTIC PT DEPARTMENT ORTHO VISIT: Performed by: PHYSICAL THERAPIST

## 2018-01-24 PROCEDURE — 97140 MANUAL THERAPY 1/> REGIONS: CPT | Mod: GP | Performed by: PHYSICAL THERAPIST

## 2018-01-24 PROCEDURE — 97110 THERAPEUTIC EXERCISES: CPT | Mod: GP | Performed by: PHYSICAL THERAPIST

## 2018-01-29 ENCOUNTER — HOSPITAL ENCOUNTER (OUTPATIENT)
Dept: PHYSICAL THERAPY | Facility: CLINIC | Age: 68
Setting detail: THERAPIES SERIES
End: 2018-01-29
Attending: ORTHOPAEDIC SURGERY
Payer: MEDICARE

## 2018-01-29 PROCEDURE — 97035 APP MDLTY 1+ULTRASOUND EA 15: CPT | Mod: GP | Performed by: PHYSICAL THERAPIST

## 2018-01-29 PROCEDURE — 40000718 ZZHC STATISTIC PT DEPARTMENT ORTHO VISIT: Performed by: PHYSICAL THERAPIST

## 2018-01-29 PROCEDURE — 97140 MANUAL THERAPY 1/> REGIONS: CPT | Mod: GP | Performed by: PHYSICAL THERAPIST

## 2018-01-30 LAB
BACTERIA SPEC CULT: NO GROWTH
SPECIMEN SOURCE: NORMAL

## 2018-02-01 ENCOUNTER — OFFICE VISIT (OUTPATIENT)
Dept: ORTHOPEDICS | Facility: CLINIC | Age: 68
End: 2018-02-01
Payer: COMMERCIAL

## 2018-02-01 VITALS — TEMPERATURE: 97.1 F | HEIGHT: 71 IN | BODY MASS INDEX: 31.78 KG/M2 | WEIGHT: 227 LBS

## 2018-02-01 DIAGNOSIS — M75.121 COMPLETE TEAR OF RIGHT ROTATOR CUFF: Primary | ICD-10-CM

## 2018-02-01 PROCEDURE — 99024 POSTOP FOLLOW-UP VISIT: CPT | Performed by: ORTHOPAEDIC SURGERY

## 2018-02-01 ASSESSMENT — PAIN SCALES - GENERAL: PAINLEVEL: NO PAIN (0)

## 2018-02-01 NOTE — NURSING NOTE
"Chief Complaint   Patient presents with     RECHECK     right shoulder follow up     Surgical Followup     DOS:12/12/2017~ Right shoulder arthroscopy with arthroscopic biceps tenodesis, subacromial decompression with partial acromioplasty.  Open right shoulder rotator cuff tendon tear, and distal clavicle excision as well as right shoulder arthroscopy with arthroscopic removal of foreign body (previous suture). ~7 weeks postop       Initial Temp 97.1  F (36.2  C) (Temporal)  Ht 1.803 m (5' 11\")  Wt 103 kg (227 lb)  BMI 31.66 kg/m2 Estimated body mass index is 31.66 kg/(m^2) as calculated from the following:    Height as of this encounter: 1.803 m (5' 11\").    Weight as of this encounter: 103 kg (227 lb).  Medication Reconciliation: complete   Marah/ROHINI     "

## 2018-02-01 NOTE — MR AVS SNAPSHOT
After Visit Summary   2/1/2018    Miguelangel Graham    MRN: [id number]           Patient Information     Date Of Birth          1950        Visit Information        Provider Department      2/1/2018 8:00 AM Jose Jones, DO [facility]         Follow-ups after your visit        Your next 10 appointments already scheduled     Feb 05, 2018  8:15 AM CST   Ortho Treatment with Kathie Kneisl, PT   [facility] ([facility])    [address] [place] [address]   [phone]            Feb 07, 2018  8:15 AM CST   Ortho Treatment with Kathie Kneisl, PT   [facility] ([facility])    [address] [place] [address]   [phone]            Feb 13, 2018  9:00 AM CST   Ortho Treatment with Kathie Kneisl, PT   [facility] ([facility])    [address] [place] [address]   [phone]            Feb 16, 2018  7:30 AM CST   Ortho Treatment with Kathie Kneisl, PT   [facility] ([facility])    [address] [place] [address]   [phone]              Who to contact     If you have questions or need follow up information about today's clinic visit or your schedule please contact [facility] directly at [phone].  Normal or non-critical lab and imaging results will be communicated to you by MyChart, letter or phone within 4 business days after the clinic has received the results. If you do not hear from us within 7 days, please contact the clinic through MyChart or phone. If you have a critical or abnormal lab result, we will notify you by phone as soon as possible.  Submit refill requests through [employer] or call your pharmacy and they will forward the refill request to us. Please allow 3 business days for your refill to be completed.          Additional Information About Your  "Visit        [facility] Information     WhoKnows lets you send messages to your doctor, view your test results, renew your prescriptions, schedule appointments and more. To sign up, go to [email or web address] . Click on \"Log in\" on the left side of the screen, which will take you to the Welcome page. Then click on \"Sign up Now\" on the right side of the page.     You will be asked to enter the access code listed below, as well as some personal information. Please follow the directions to create your username and password.     Your access code is: [email or web address]  Expires: 2018 10:13 AM     Your access code will  in 90 days. If you need help or a new code, please call your [place] clinic or [phone].        Care EveryWhere ID     This is your Care EveryWhere ID. This could be used by other organizations to access your [place] medical records  [id number]        Your Vitals Were     Temperature Height BMI (Body Mass Index)             97.1  F (36.2  C) (Temporal) 1.803 m (5' 11\") 31.66 kg/m2          Blood Pressure from Last 3 Encounters:   17 130/75   17 134/80   13 122/78    Weight from Last 3 Encounters:   18 103 kg (227 lb)   18 103 kg (227 lb)   17 103.4 kg (228 lb)              Today, you had the following     No orders found for display       Primary Care Provider Fax #    Physician No Ref-Primary [phone]       No address on file        Equal Access to Services     [facility] : Hadii concha ortiz Somaddie, waaxda luqadaha, qaybta kaalmasherwin osorio . So [facility] [phone].    ATENCIÓN: Si habla español, tiene a alicea disposición servicios gratuitos de asistencia lingüística. Llame al [phone].    We comply with applicable federal civil rights laws and [place] laws. We do not discriminate on the basis of race, color, national origin, age, disability, sex, sexual orientation, or gender identity.            Thank " you!     Thank you for choosing [facility]  for your care. Our goal is always to provide you with excellent care. Hearing back from our patients is one way we can continue to improve our services. Please take a few minutes to complete the written survey that you may receive in the mail after your visit with us. Thank you!             Your Updated Medication List - Protect others around you: Learn how to safely use, store and throw away your medicines at www.disposemymeds.org.      Notice  As of 2/1/2018  8:06 AM    You have not been prescribed any medications.

## 2018-02-01 NOTE — PROGRESS NOTES
Orthopedic Clinic Post-Operative Note    CHIEF COMPLAINT:   Chief Complaint   Patient presents with     RECHECK     right shoulder follow up     Surgical Followup     DOS:12/12/2017~ Right shoulder arthroscopy with arthroscopic biceps tenodesis, subacromial decompression with partial acromioplasty.  Open right shoulder rotator cuff tendon tear, and distal clavicle excision as well as right shoulder arthroscopy with arthroscopic removal of foreign body (previous suture). ~7 weeks postop       HISTORY OF PRESENT ILLNESS  Couple of days of stiffness and soreness with the right shoulder.  This is since worked its way out.  He is doing physical therapy.    Right hip has improved since aspiration and injection.    Patient's past medical, surgical, social and family histories reviewed.     Past Medical History:   Diagnosis Date     PONV (postoperative nausea and vomiting) 12/12/2017    shoulder surgery       Past Surgical History:   Procedure Laterality Date     ARTHROSCOPY SHOULDER BICEPS TENODESIS REPAIR Right 12/12/2017    Procedure: ARTHROSCOPY SHOULDER BICEPS TENODESIS REPAIR;;  Surgeon: Jose Jones DO;  [facility]     ARTHROSCOPY SHOULDER DECOMPRESSION Right 12/12/2017    Procedure: ARTHROSCOPY SHOULDER DECOMPRESSION;;  Surgeon: Jose Jones DO;  [facility]     ARTHROSCOPY SHOULDER DISTAL CLAVICLE REPAIR Right 12/12/2017    Procedure: ARTHROSCOPY SHOULDER DISTAL CLAVICLE RESECTION;;  Surgeon: Jose Jones DO;  [facility]     ARTHROSCOPY SHOULDER, OPEN ROTATOR CUFF REPAIR, COMBINED Right 12/12/2017    Procedure: COMBINED ARTHROSCOPY SHOULDER, OPEN ROTATOR CUFF REPAIR;  right shoulder arthroscopy with open mini rotator cuff repair, biceps tenodesis, distal clavicle and subacromial decompression with partial acromioplasty and removal of foreing body;  Surgeon: Jose Jones DO;  Location[facility]     HC KNEE SCOPE, DIAGNOSTIC      Arthroscopy, Knee     HC  "REPAIR ROTATOR CUFF,CHRONIC       REMOVE FOREIGN BODY UPPER EXTREMITY Right 12/12/2017    Procedure: REMOVE FOREIGN BODY UPPER EXTREMITY;;  Surgeon: Jose Jones DO;  Location: [facility]       Medications:    No current outpatient prescriptions on file prior to visit.  No current facility-administered medications on file prior to visit.     Allergies   Allergen Reactions     No Known Drug Allergies        Social History     Occupational History           Social History Main Topics     Smoking status: Former Smoker     Quit date: 1/1/1985     Smokeless tobacco: Never Used     Alcohol use Yes      Comment: daily      Drug use: No     Sexual activity: Not on file       Family History   Problem Relation Age of Onset     C.A.D. Brother      C.ARADHA. Brother      EMMY.ARADHA. Brother      C.ARADHA. Brother      ANTELMOARADHA. Brother      DIABETES Sister      DIABETES Sister      DIABETES Brother      DIABETES Brother      DIABETES Brother        REVIEW OF SYSTEMS  General: negative for, night sweats, dizziness, fatigue  Resp: No shortness of breath and no cough  CV: negative for chest pain, syncope or near-syncope  GI: negative for nausea, vomiting and diarrhea  : negative for dysuria and hematuria  Musculoskeletal: as above  Neurologic: negative for syncope   Hematologic: negative for bleeding disorder    Physical Exam:  Vitals: Temp 97.1  F (36.2  C) (Temporal)  Ht 5' 11\" (1.803 m)  Wt 227 lb (103 kg)  BMI 31.66 kg/m2  BMI= Body mass index is 31.66 kg/(m^2).  Constitutional: healthy, alert and no acute distress   Psychiatric: mentation appears normal and affect normal/bright  NEURO: no focal deficits  SKIN: .well healed, no erythema, no incision breakdown and no drainage.  JOINT/EXTREMITIES: Expected stiffness with motion of the shoulder.  No focal areas of tenderness.  Passively I am able to take him to 140/140 with a tight endpoint.  External rotation is approximately 5-10 .  GAIT: not tested     Diagnostic " Modalities:  None today.  Independent visualization of the images was performed.      Impression:   Chief Complaint   Patient presents with     RECHECK     right shoulder follow up     Surgical Followup     DOS:12/12/2017~ Right shoulder arthroscopy with arthroscopic biceps tenodesis, subacromial decompression with partial acromioplasty.  Open right shoulder rotator cuff tendon tear, and distal clavicle excision as well as right shoulder arthroscopy with arthroscopic removal of foreign body (previous suture). ~7 weeks postop   Doing as expected.  Right hip osteoarthritis doing well after the aspiration and steroid injection.    Plan:   Continue physical therapy following the protocol.  Return sooner if any questions concerns but otherwise I would expect to see him in approximately 6 weeks for the shoulder.  Return to clinic 6, week(s), or sooner as needed for changes.    Re-x-ray on return: No    Bhupendra Jones D.O.

## 2018-02-01 NOTE — LETTER
2/1/2018         RE: Miguelangel Graham  [address] [facility] [address]        Dear Colleague,    Thank you for referring your patient, Miguelangel Graham, to the [facility]. Please see a copy of my visit note below.    Orthopedic Clinic Post-Operative Note    CHIEF COMPLAINT:   Chief Complaint   Patient presents with     RECHECK     right shoulder follow up     Surgical Followup     DOS:12/12/2017~ Right shoulder arthroscopy with arthroscopic biceps tenodesis, subacromial decompression with partial acromioplasty.  Open right shoulder rotator cuff tendon tear, and distal clavicle excision as well as right shoulder arthroscopy with arthroscopic removal of foreign body (previous suture). ~7 weeks postop       HISTORY OF PRESENT ILLNESS  Couple of days of stiffness and soreness with the right shoulder.  This is since worked its way out.  He is doing physical therapy.    Right hip has improved since aspiration and injection.    Patient's past medical, surgical, social and family histories reviewed.     Past Medical History:   Diagnosis Date     PONV (postoperative nausea and vomiting) 12/12/2017    shoulder surgery       Past Surgical History:   Procedure Laterality Date     ARTHROSCOPY SHOULDER BICEPS TENODESIS REPAIR Right 12/12/2017    Procedure: ARTHROSCOPY SHOULDER BICEPS TENODESIS REPAIR;;  Surgeon: Jose Jones DO;  [facility]     ARTHROSCOPY SHOULDER DECOMPRESSION Right 12/12/2017    Procedure: ARTHROSCOPY SHOULDER DECOMPRESSION;;  Surgeon: Jose Jones DO;  [facility]     ARTHROSCOPY SHOULDER DISTAL CLAVICLE REPAIR Right 12/12/2017    Procedure: ARTHROSCOPY SHOULDER DISTAL CLAVICLE RESECTION;;  Surgeon: Jose Jones DO;  [facility]     ARTHROSCOPY SHOULDER, OPEN ROTATOR CUFF REPAIR, COMBINED Right 12/12/2017    Procedure: COMBINED ARTHROSCOPY SHOULDER, OPEN ROTATOR CUFF REPAIR;  right shoulder arthroscopy with open mini rotator cuff  "repair, biceps tenodesis, distal clavicle and subacromial decompression with partial acromioplasty and removal of foreing body;  Surgeon: Jose Jones DO;  [facility]     HC KNEE SCOPE, DIAGNOSTIC      Arthroscopy, Knee     HC REPAIR ROTATOR CUFF,CHRONIC       REMOVE FOREIGN BODY UPPER EXTREMITY Right 12/12/2017    Procedure: REMOVE FOREIGN BODY UPPER EXTREMITY;;  Surgeon: Jose Jones DO;  Location[facility]       Medications:    No current outpatient prescriptions on file prior to visit.  No current facility-administered medications on file prior to visit.     Allergies   Allergen Reactions     No Known Drug Allergies        Social History     Occupational History           Social History Main Topics     Smoking status: Former Smoker     Quit date: 1/1/1985     Smokeless tobacco: Never Used     Alcohol use Yes      Comment: daily      Drug use: No     Sexual activity: Not on file       Family History   Problem Relation Age of Onset     C.AROMI Brother      TOÑITO Brother      TOÑITO Brother      TOÑITO Brother      TOÑITO Brother      DIABETES Sister      DIABETES Sister      DIABETES Brother      DIABETES Brother      DIABETES Brother        REVIEW OF SYSTEMS  General: negative for, night sweats, dizziness, fatigue  Resp: No shortness of breath and no cough  CV: negative for chest pain, syncope or near-syncope  GI: negative for nausea, vomiting and diarrhea  : negative for dysuria and hematuria  Musculoskeletal: as above  Neurologic: negative for syncope   Hematologic: negative for bleeding disorder    Physical Exam:  Vitals: Temp 97.1  F (36.2  C) (Temporal)  Ht 5' 11\" (1.803 m)  Wt 227 lb (103 kg)  BMI 31.66 kg/m2  BMI= Body mass index is 31.66 kg/(m^2).  Constitutional: healthy, alert and no acute distress   Psychiatric: mentation appears normal and affect normal/bright  NEURO: no focal deficits  SKIN: .well healed, no erythema, no incision breakdown and no " drainage.  JOINT/EXTREMITIES: Expected stiffness with motion of the shoulder.  No focal areas of tenderness.  Passively I am able to take him to 140/140 with a tight endpoint.  External rotation is approximately 5-10 .  GAIT: not tested     Diagnostic Modalities:  None today.  Independent visualization of the images was performed.      Impression:   Chief Complaint   Patient presents with     RECHECK     right shoulder follow up     Surgical Followup     DOS:12/12/2017~ Right shoulder arthroscopy with arthroscopic biceps tenodesis, subacromial decompression with partial acromioplasty.  Open right shoulder rotator cuff tendon tear, and distal clavicle excision as well as right shoulder arthroscopy with arthroscopic removal of foreign body (previous suture). ~7 weeks postop   Doing as expected.  Right hip osteoarthritis doing well after the aspiration and steroid injection.    Plan:   Continue physical therapy following the protocol.  Return sooner if any questions concerns but otherwise I would expect to see him in approximately 6 weeks for the shoulder.  Return to clinic 6, week(s), or sooner as needed for changes.    Re-x-ray on return: No    Bhupendra Jones D.O.    Again, thank you for allowing me to participate in the care of your patient.        Sincerely,        Jose Jones, DO

## 2018-02-05 ENCOUNTER — HOSPITAL ENCOUNTER (OUTPATIENT)
Dept: PHYSICAL THERAPY | Facility: CLINIC | Age: 68
Setting detail: THERAPIES SERIES
End: 2018-02-05
Attending: ORTHOPAEDIC SURGERY
Payer: MEDICARE

## 2018-02-05 PROCEDURE — G8984 CARRY CURRENT STATUS: HCPCS | Mod: GP,CJ | Performed by: PHYSICAL THERAPIST

## 2018-02-05 PROCEDURE — 40000718 ZZHC STATISTIC PT DEPARTMENT ORTHO VISIT: Performed by: PHYSICAL THERAPIST

## 2018-02-05 PROCEDURE — G8985 CARRY GOAL STATUS: HCPCS | Mod: GP,CH | Performed by: PHYSICAL THERAPIST

## 2018-02-05 PROCEDURE — 97110 THERAPEUTIC EXERCISES: CPT | Mod: GP | Performed by: PHYSICAL THERAPIST

## 2018-02-05 NOTE — PROGRESS NOTES
Outpatient Physical Therapy Progress Note     Patient: Miguelangel Graham  : 1950    Beginning/End Dates of Reporting Period:  1/3/2018 to 2018    Referring Provider: Dr. Jose Jones    Therapy Diagnosis: Decreased ROM, strength, and mobility secondary to post operative R RTC repair.     Client Self Report: Pt reports doing better since last visit.  States MD follow up went well and wants him to continue.  Pt reports that he still has stiffness between exercises.  Does not use his arm much for activities.      Objective Measurements:  Objective Measure: ROM  Details: AROM seated R shoulder: flexion 94 degrees, abduction 83 degrees, ER in neutral 28 degrees.  AROM supine R shoulder: flexion 137 degrees, abduction 111 degrees, and ER in neutral 40 degrees.  AAROM of R shoulder supine: flexion 145 degrees, abduction 121 degrees, ER in neutral 49 degrees.  AAROM of R shoulder seated: flexion 121 degrees, abduction 110 degrees  Objective Measure: Palpation  Details: R glenohumeral joint restrictions with inferior and posterior motions resulting in poor mechanics of the shoulder with overhead motions.  Objective Measure: Strength  Details: Pt is able to activate lower trap musculature for scapular stability, however once he moves R shoulder beyond 30 degrees of abduction or flexion he loses the contraction and is not able to stabilize.    Goals:  Goal Identifier #1   Goal Description Pt will demonstrate normal PROM of the R shoulder of >160 degrees for flexion and abduction in order to progress to AROM activities.   Target Date 18   Date Met      Progress:     Goal Identifier #2   Goal Description Pt will demonstrate AROM of the R shoulder flexion and abduction >160 degrees in order to complete overhead activities such as washing hair and putting dishes away.   Target Date 18   Date Met      Progress:     Goal Identifier #3   Goal Description Pt will demonstrate ability to sleep for 6 hours in bed  without waking to R shoulder pain in order to have restful night of sleep.   Target Date 02/17/18   Date Met  02/05/18   Progress:     Goal Identifier #4   Goal Description Pt will demonstrate ability to don and doff his jacket and clothes with <2/10 R shoulder pain in order to be independent with dressing.   Target Date 03/04/18   Date Met      Progress:     Progress Toward Goals:   Progress this reporting period: Pt has been seen for a total of 10 visits since the start of therapy and since surgery.  Pt demonstrates improvement in ROM and activity tolerance since surgery.  He is progressing slowly with occasional flare up of symptoms.  Pt is not using the R UE as much for daily activities due to some pain with reaching out to the side, reaching overhead, and reaching down to the side like buckling his seat belt.  He still reports pain with sleeping on the R side.  Pt is able to sleep in bed, demonstrates improved ROM, and is starting to tolerate weight activities.  However pt continues to struggle with lower trapezius stability to maintain proper scapular mechanics and positioning with shoulder activities.  He has poor inferior and posterior mobility of the R glenohumeral joint.  Pt demonstrates weakness with upright activities.  PT will continue to work with pt on progressing ROM, joint mobility, strength, activity tolerance, and endurance exercises in order to return pt to OF.    Plan:  Continue therapy per current plan of care.    Discharge:  No    Thank you for your referral.    Kathie Palm, PT, DPT  [facility]  [phone]

## 2018-02-06 LAB
BACTERIA SPEC CULT: NORMAL
Lab: NORMAL
SPECIMEN SOURCE: NORMAL

## 2018-02-07 ENCOUNTER — HOSPITAL ENCOUNTER (OUTPATIENT)
Dept: PHYSICAL THERAPY | Facility: CLINIC | Age: 68
Setting detail: THERAPIES SERIES
End: 2018-02-07
Attending: ORTHOPAEDIC SURGERY
Payer: MEDICARE

## 2018-02-07 PROCEDURE — 97110 THERAPEUTIC EXERCISES: CPT | Mod: GP | Performed by: PHYSICAL THERAPIST

## 2018-02-07 PROCEDURE — 97140 MANUAL THERAPY 1/> REGIONS: CPT | Mod: GP | Performed by: PHYSICAL THERAPIST

## 2018-02-07 PROCEDURE — 40000718 ZZHC STATISTIC PT DEPARTMENT ORTHO VISIT: Performed by: PHYSICAL THERAPIST

## 2018-02-13 ENCOUNTER — HOSPITAL ENCOUNTER (OUTPATIENT)
Dept: PHYSICAL THERAPY | Facility: CLINIC | Age: 68
Setting detail: THERAPIES SERIES
End: 2018-02-13
Attending: ORTHOPAEDIC SURGERY
Payer: MEDICARE

## 2018-02-13 PROCEDURE — 40000718 ZZHC STATISTIC PT DEPARTMENT ORTHO VISIT: Performed by: PHYSICAL THERAPIST

## 2018-02-13 PROCEDURE — 97110 THERAPEUTIC EXERCISES: CPT | Mod: GP | Performed by: PHYSICAL THERAPIST

## 2018-02-13 PROCEDURE — 97140 MANUAL THERAPY 1/> REGIONS: CPT | Mod: GP | Performed by: PHYSICAL THERAPIST

## 2018-02-16 ENCOUNTER — HOSPITAL ENCOUNTER (OUTPATIENT)
Dept: PHYSICAL THERAPY | Facility: CLINIC | Age: 68
Setting detail: THERAPIES SERIES
End: 2018-02-16
Attending: ORTHOPAEDIC SURGERY
Payer: MEDICARE

## 2018-02-16 PROCEDURE — 40000718 ZZHC STATISTIC PT DEPARTMENT ORTHO VISIT: Performed by: PHYSICAL THERAPIST

## 2018-02-16 PROCEDURE — 97140 MANUAL THERAPY 1/> REGIONS: CPT | Mod: GP | Performed by: PHYSICAL THERAPIST

## 2018-02-16 PROCEDURE — 97110 THERAPEUTIC EXERCISES: CPT | Mod: GP | Performed by: PHYSICAL THERAPIST

## 2018-02-20 ENCOUNTER — HOSPITAL ENCOUNTER (OUTPATIENT)
Dept: PHYSICAL THERAPY | Facility: CLINIC | Age: 68
Setting detail: THERAPIES SERIES
End: 2018-02-20
Attending: ORTHOPAEDIC SURGERY
Payer: MEDICARE

## 2018-02-20 PROCEDURE — 97140 MANUAL THERAPY 1/> REGIONS: CPT | Mod: GP | Performed by: PHYSICAL THERAPIST

## 2018-02-20 PROCEDURE — 40000718 ZZHC STATISTIC PT DEPARTMENT ORTHO VISIT: Performed by: PHYSICAL THERAPIST

## 2018-02-20 PROCEDURE — 97110 THERAPEUTIC EXERCISES: CPT | Mod: GP | Performed by: PHYSICAL THERAPIST

## 2018-02-27 ENCOUNTER — HOSPITAL ENCOUNTER (OUTPATIENT)
Dept: PHYSICAL THERAPY | Facility: CLINIC | Age: 68
Setting detail: THERAPIES SERIES
End: 2018-02-27
Attending: ORTHOPAEDIC SURGERY
Payer: MEDICARE

## 2018-02-27 PROCEDURE — 40000718 ZZHC STATISTIC PT DEPARTMENT ORTHO VISIT: Performed by: PHYSICAL THERAPIST

## 2018-02-27 PROCEDURE — 97110 THERAPEUTIC EXERCISES: CPT | Mod: GP | Performed by: PHYSICAL THERAPIST

## 2018-02-27 PROCEDURE — 97140 MANUAL THERAPY 1/> REGIONS: CPT | Mod: GP | Performed by: PHYSICAL THERAPIST

## 2018-03-13 ENCOUNTER — HOSPITAL ENCOUNTER (OUTPATIENT)
Dept: PHYSICAL THERAPY | Facility: CLINIC | Age: 68
Setting detail: THERAPIES SERIES
End: 2018-03-13
Attending: ORTHOPAEDIC SURGERY
Payer: MEDICARE

## 2018-03-13 PROCEDURE — 40000718 ZZHC STATISTIC PT DEPARTMENT ORTHO VISIT: Performed by: PHYSICAL THERAPIST

## 2018-03-13 PROCEDURE — 97140 MANUAL THERAPY 1/> REGIONS: CPT | Mod: GP | Performed by: PHYSICAL THERAPIST

## 2018-03-13 PROCEDURE — 97110 THERAPEUTIC EXERCISES: CPT | Mod: GP | Performed by: PHYSICAL THERAPIST

## 2018-03-15 ENCOUNTER — OFFICE VISIT (OUTPATIENT)
Dept: ORTHOPEDICS | Facility: CLINIC | Age: 68
End: 2018-03-15
Payer: COMMERCIAL

## 2018-03-15 VITALS — TEMPERATURE: 97.4 F | WEIGHT: 233.5 LBS | BODY MASS INDEX: 32.69 KG/M2 | HEIGHT: 71 IN

## 2018-03-15 DIAGNOSIS — M75.121 COMPLETE TEAR OF RIGHT ROTATOR CUFF: ICD-10-CM

## 2018-03-15 DIAGNOSIS — Z98.890 S/P ROTATOR CUFF REPAIR: Primary | ICD-10-CM

## 2018-03-15 PROCEDURE — 99212 OFFICE O/P EST SF 10 MIN: CPT | Performed by: ORTHOPAEDIC SURGERY

## 2018-03-15 ASSESSMENT — PAIN SCALES - GENERAL: PAINLEVEL: NO PAIN (0)

## 2018-03-15 NOTE — NURSING NOTE
"Chief Complaint   Patient presents with     RECHECK     right shoulder follow up     Surgical Followup     DOS:12/12/2017~ Right shoulder arthroscopy with arthroscopic biceps tenodesis, subacromial decompression with partial acromioplasty.  Open right shoulder rotator cuff tendon tear, and distal clavicle excision as well as right shoulder arthroscopy with arthroscopic removal of foreign body (previous suture). ~13 weeks postop       Initial Temp 97.4  F (36.3  C) (Temporal)  Ht 1.803 m (5' 11\")  Wt 105.9 kg (233 lb 8 oz)  BMI 32.57 kg/m2 Estimated body mass index is 32.57 kg/(m^2) as calculated from the following:    Height as of this encounter: 1.803 m (5' 11\").    Weight as of this encounter: 105.9 kg (233 lb 8 oz).  Medication Reconciliation: complete   Marah/ROHINI     "

## 2018-03-15 NOTE — LETTER
3/15/2018         RE: Miguelangel Graham  [address] [facility] [address]        Dear Colleague,    Thank you for referring your patient, Miguelangel Graham, to the [facility]. Please see a copy of my visit note below.    Orthopedic Clinic Post-Operative Note    CHIEF COMPLAINT:   Chief Complaint   Patient presents with     RECHECK     right shoulder follow up     Surgical Followup     DOS:12/12/2017~ Right shoulder arthroscopy with arthroscopic biceps tenodesis, subacromial decompression with partial acromioplasty.  Open right shoulder rotator cuff tendon tear, and distal clavicle excision as well as right shoulder arthroscopy with arthroscopic removal of foreign body (previous suture). ~13 weeks postop       HISTORY OF PRESENT ILLNESS  Returns to clinic.  Feels he is making progress, but is slow.  States pain is well, starting to add small amounts of resistance and strength with physical therapy.  Doing at home exercises 3xday and down to 1 day a week of outpatient PT.  States no new injury no new concerns.  Feels his function is returning.  Overall feels improved since last visit.     Patient's past medical, surgical, social and family histories reviewed.     Past Medical History:   Diagnosis Date     PONV (postoperative nausea and vomiting) 12/12/2017    shoulder surgery       Past Surgical History:   Procedure Laterality Date     ARTHROSCOPY SHOULDER BICEPS TENODESIS REPAIR Right 12/12/2017    Procedure: ARTHROSCOPY SHOULDER BICEPS TENODESIS REPAIR;;  Surgeon: Jose Jones DO;  [facility]     ARTHROSCOPY SHOULDER DECOMPRESSION Right 12/12/2017    Procedure: ARTHROSCOPY SHOULDER DECOMPRESSION;;  Surgeon: Jose Jones DO;  [facility]     ARTHROSCOPY SHOULDER DISTAL CLAVICLE REPAIR Right 12/12/2017    Procedure: ARTHROSCOPY SHOULDER DISTAL CLAVICLE RESECTION;;  Surgeon: Jose Jones DO;  Location[facility]     ARTHROSCOPY SHOULDER, OPEN ROTATOR CUFF  "REPAIR, COMBINED Right 12/12/2017    Procedure: COMBINED ARTHROSCOPY SHOULDER, OPEN ROTATOR CUFF REPAIR;  right shoulder arthroscopy with open mini rotator cuff repair, biceps tenodesis, distal clavicle and subacromial decompression with partial acromioplasty and removal of foreing body;  Surgeon: Jose Jones DO;  Location[facility]     HC KNEE SCOPE, DIAGNOSTIC      Arthroscopy, Knee     HC REPAIR ROTATOR CUFF,CHRONIC       REMOVE FOREIGN BODY UPPER EXTREMITY Right 12/12/2017    Procedure: REMOVE FOREIGN BODY UPPER EXTREMITY;;  Surgeon: Jose Jones DO;  Location[facility]       Medications:    No current outpatient prescriptions on file prior to visit.  No current facility-administered medications on file prior to visit.     Allergies   Allergen Reactions     No Known Drug Allergies        Social History     Occupational History           Social History Main Topics     Smoking status: Former Smoker     Quit date: 1/1/1985     Smokeless tobacco: Never Used     Alcohol use Yes      Comment: daily      Drug use: No     Sexual activity: Not on file       Family History   Problem Relation Age of Onset     TOÑITO Brother      TOÑITO Brother      TOÑITO Brother      TOÑITO Brother      TOÑITO Brother      DIABETES Sister      DIABETES Sister      DIABETES Brother      DIABETES Brother      DIABETES Brother      Physical Exam:  Vitals: Temp 97.4  F (36.3  C) (Temporal)  Ht 1.803 m (5' 11\")  Wt 105.9 kg (233 lb 8 oz)  BMI 32.57 kg/m2  BMI= Body mass index is 32.57 kg/(m^2).  BMI= Body mass index is 31.66 kg/(m^2).  Constitutional: healthy, alert and no acute distress   Psychiatric: mentation appears normal and affect normal/bright  NEURO: no focal deficits  SKIN: .well healed, no erythema, no incision breakdown and no drainage.  JOINT/EXTREMITIES: Continued stiffness with motion of the shoulder.  No focal areas of tenderness.  Passively I am able to take him to 160/160 with a tight " endpoint.  External rotation is approximately 35 degrees  GAIT: not tested       Diagnostic Modalities:  None today.  Previous imaging reviewed.  Independent visualization of the images was performed.      Impression:   Chief Complaint   Patient presents with     RECHECK     right shoulder follow up     Surgical Followup     DOS:12/12/2017~ Right shoulder arthroscopy with arthroscopic biceps tenodesis, subacromial decompression with partial acromioplasty.  Open right shoulder rotator cuff tendon tear, and distal clavicle excision as well as right shoulder arthroscopy with arthroscopic removal of foreign body (previous suture). ~13 weeks postop   recovering as expected, making slow progress.    Plan:   Continue physical therapy and HEP following the protocol.  Return sooner if any questions concerns but otherwise I would expect to see him in approximately 8 weeks for the shoulder.  Return to clinic 8, week(s), or sooner as needed for changes.    Re-x-ray on return: No    Scribed by:  POONAM Avery, CNP, DNP  8:22 AM  3/15/2018    I attest I have seen and evaluated the patient.  I agree with above impression and plan.  Bhupendra Jones D.O.    Again, thank you for allowing me to participate in the care of your patient.        Sincerely,        Jose Jones, DO

## 2018-03-15 NOTE — PROGRESS NOTES
Orthopedic Clinic Post-Operative Note    CHIEF COMPLAINT:   Chief Complaint   Patient presents with     RECHECK     right shoulder follow up     Surgical Followup     DOS:12/12/2017~ Right shoulder arthroscopy with arthroscopic biceps tenodesis, subacromial decompression with partial acromioplasty.  Open right shoulder rotator cuff tendon tear, and distal clavicle excision as well as right shoulder arthroscopy with arthroscopic removal of foreign body (previous suture). ~13 weeks postop       HISTORY OF PRESENT ILLNESS  Returns to clinic.  Feels he is making progress, but is slow.  States pain is well, starting to add small amounts of resistance and strength with physical therapy.  Doing at home exercises 3xday and down to 1 day a week of outpatient PT.  States no new injury no new concerns.  Feels his function is returning.  Overall feels improved since last visit.     Patient's past medical, surgical, social and family histories reviewed.     Past Medical History:   Diagnosis Date     PONV (postoperative nausea and vomiting) 12/12/2017    shoulder surgery       Past Surgical History:   Procedure Laterality Date     ARTHROSCOPY SHOULDER BICEPS TENODESIS REPAIR Right 12/12/2017    Procedure: ARTHROSCOPY SHOULDER BICEPS TENODESIS REPAIR;;  Surgeon: Jose Jones DO;  [facility]     ARTHROSCOPY SHOULDER DECOMPRESSION Right 12/12/2017    Procedure: ARTHROSCOPY SHOULDER DECOMPRESSION;;  Surgeon: Jose Jones DO;  [facility]     ARTHROSCOPY SHOULDER DISTAL CLAVICLE REPAIR Right 12/12/2017    Procedure: ARTHROSCOPY SHOULDER DISTAL CLAVICLE RESECTION;;  Surgeon: Jose Jones DO;  [facility]     ARTHROSCOPY SHOULDER, OPEN ROTATOR CUFF REPAIR, COMBINED Right 12/12/2017    Procedure: COMBINED ARTHROSCOPY SHOULDER, OPEN ROTATOR CUFF REPAIR;  right shoulder arthroscopy with open mini rotator cuff repair, biceps tenodesis, distal clavicle and subacromial decompression with  "partial acromioplasty and removal of foreing body;  Surgeon: Jose Jones DO;  [facility]     HC KNEE SCOPE, DIAGNOSTIC      Arthroscopy, Knee     HC REPAIR ROTATOR CUFF,CHRONIC       REMOVE FOREIGN BODY UPPER EXTREMITY Right 12/12/2017    Procedure: REMOVE FOREIGN BODY UPPER EXTREMITY;;  Surgeon: Jose Jones DO;  Location[facility]       Medications:    No current outpatient prescriptions on file prior to visit.  No current facility-administered medications on file prior to visit.     Allergies   Allergen Reactions     No Known Drug Allergies        Social History     Occupational History           Social History Main Topics     Smoking status: Former Smoker     Quit date: 1/1/1985     Smokeless tobacco: Never Used     Alcohol use Yes      Comment: daily      Drug use: No     Sexual activity: Not on file       Family History   Problem Relation Age of Onset     TOÑITO Brother      TOÑITO Brother      TOÑITO Brother      TOÑITO Brother      TOÑITO Brother      DIABETES Sister      DIABETES Sister      DIABETES Brother      DIABETES Brother      DIABETES Brother      Physical Exam:  Vitals: Temp 97.4  F (36.3  C) (Temporal)  Ht 1.803 m (5' 11\")  Wt 105.9 kg (233 lb 8 oz)  BMI 32.57 kg/m2  BMI= Body mass index is 32.57 kg/(m^2).  BMI= Body mass index is 31.66 kg/(m^2).  Constitutional: healthy, alert and no acute distress   Psychiatric: mentation appears normal and affect normal/bright  NEURO: no focal deficits  SKIN: .well healed, no erythema, no incision breakdown and no drainage.  JOINT/EXTREMITIES: Continued stiffness with motion of the shoulder.  No focal areas of tenderness.  Passively I am able to take him to 160/160 with a tight endpoint.  External rotation is approximately 35 degrees  GAIT: not tested       Diagnostic Modalities:  None today.  Previous imaging reviewed.  Independent visualization of the images was performed.      Impression:   Chief Complaint "   Patient presents with     RECHECK     right shoulder follow up     Surgical Followup     DOS:12/12/2017~ Right shoulder arthroscopy with arthroscopic biceps tenodesis, subacromial decompression with partial acromioplasty.  Open right shoulder rotator cuff tendon tear, and distal clavicle excision as well as right shoulder arthroscopy with arthroscopic removal of foreign body (previous suture). ~13 weeks postop   recovering as expected, making slow progress.    Plan:   Continue physical therapy and HEP following the protocol.  Return sooner if any questions concerns but otherwise I would expect to see him in approximately 8 weeks for the shoulder.  Return to clinic 8, week(s), or sooner as needed for changes.    Re-x-ray on return: No    Scribed by:  Gurdeep Sales, APRN, CNP, DNP  8:22 AM  3/15/2018    I attest I have seen and evaluated the patient.  I agree with above impression and plan.  Bhupendra Jones D.O.

## 2018-03-15 NOTE — MR AVS SNAPSHOT
After Visit Summary   3/15/2018    Miguelangel Graham    MRN: [id number]           Patient Information     Date Of Birth          1950        Visit Information        Provider Department      3/15/2018 8:00 AM Jose Jones DO [facility]        Today's Diagnoses     S/P rotator cuff repair    -  1    Complete tear of right rotator cuff           Follow-ups after your visit        Your next 10 appointments already scheduled     Mar 20, 2018  7:30 AM CDT   Ortho Treatment with Kathie Palm, PT   [facility] ([facility])    [address] [place] [address] [address] [place] [address]   [phone]            Mar 27, 2018  8:15 AM CDT   Ortho Treatment with Kathie Palm, PT   [facility] ([facility])    [address] [place] [address] [address] [place] [address] [phone]            Apr 26, 2018  8:30 AM CDT   [facility] Visit with Jose Jones DO   [facility]    [address] [place] [id number] [phone]              Who to contact     If you have questions or need follow up information about today's clinic visit or your schedule please contact [facility] directly at [phone].  Normal or non-critical lab and imaging results will be communicated to you by [employer], letter or phone within 4 business days after the clinic has received the results. If you do not hear from us within 7 days, please contact the clinic through [employer] or phone. If you have a critical or abnormal lab result, we will notify you by phone as soon as possible.  Submit refill requests through [employer] or call your pharmacy and they will forward the refill request to us. Please allow 3 business days for your refill to be completed.          Additional Information About Your Visit        [employer] Information     [employer] lets you send messages to your doctor, view your test  "results, renew your prescriptions, schedule appointments and more. To sign up, go to [email or web address] . Click on \"Log in\" on the left side of the screen, which will take you to the Welcome page. Then click on \"Sign up Now\" on the right side of the page.     You will be asked to enter the access code listed below, as well as some personal information. Please follow the directions to create your username and password.     Your access code is: [email or web address]  Expires: 2018 11:13 AM     Your access code will  in 90 days. If you need help or a new code, please call your [facility] clinic or [phone].        Care EveryWhere ID     This is your Care EveryWhere ID. This could be used by other organizations to access your [facility] medical records  [id number]        Your Vitals Were     Temperature Height BMI (Body Mass Index)             97.4  F (36.3  C) (Temporal) 1.803 m (5' 11\") 32.57 kg/m2          Blood Pressure from Last 3 Encounters:   17 130/75   17 134/80   13 122/78    Weight from Last 3 Encounters:   03/15/18 105.9 kg (233 lb 8 oz)   18 103 kg (227 lb)   18 103 kg (227 lb)              Today, you had the following     No orders found for display       Primary Care Provider Fax #    Physician No Ref-Primary [phone]       No address on file        Equal Access to Services     WILLIAM SKY : Hadii aad ku hadasho Sorogerioali, waaxda luqadaha, qaybta kaalmada adeegyastephania, sherwin yoder . So [facility] [phone].    ATENCIÓN: Si habla español, tiene a alicea disposición servicios gratuitos de asistencia lingüística. Llame al [phone].    We comply with applicable federal civil rights laws and [place] laws. We do not discriminate on the basis of race, color, national origin, age, disability, sex, sexual orientation, or gender identity.            Thank you!     Thank you for choosing [facility]  for your care. Our goal is always " to provide you with excellent care. Hearing back from our patients is one way we can continue to improve our services. Please take a few minutes to complete the written survey that you may receive in the mail after your visit with us. Thank you!             Your Updated Medication List - Protect others around you: Learn how to safely use, store and throw away your medicines at www.disposemymeds.org.      Notice  As of 3/15/2018  8:43 AM    You have not been prescribed any medications.

## 2018-03-20 ENCOUNTER — HOSPITAL ENCOUNTER (OUTPATIENT)
Dept: PHYSICAL THERAPY | Facility: CLINIC | Age: 68
Setting detail: THERAPIES SERIES
End: 2018-03-20
Attending: ORTHOPAEDIC SURGERY
Payer: MEDICARE

## 2018-03-20 PROCEDURE — G8985 CARRY GOAL STATUS: HCPCS | Mod: GP,CH | Performed by: PHYSICAL THERAPIST

## 2018-03-20 PROCEDURE — 97140 MANUAL THERAPY 1/> REGIONS: CPT | Mod: GP | Performed by: PHYSICAL THERAPIST

## 2018-03-20 PROCEDURE — G8984 CARRY CURRENT STATUS: HCPCS | Mod: GP,CJ | Performed by: PHYSICAL THERAPIST

## 2018-03-20 PROCEDURE — 40000718 ZZHC STATISTIC PT DEPARTMENT ORTHO VISIT: Performed by: PHYSICAL THERAPIST

## 2018-03-20 NOTE — PROGRESS NOTES
[facility]      OUTPATIENT PHYSICAL THERAPY  PLAN OF TREATMENT FOR OUTPATIENT REHABILITATION    Patient's Last Name, First Name, M.I.                YOB: 1950  Miguelangel Graham  TAMMI                        Provider's Name  [facility] Medical Record No.  [id number]                               Onset Date: 12/12/17   Start of Care Date: 1/3/2018   Type:     _X_PT   ___OT   ___SLP Medical Diagnosis: Complete tear of right rotator cuff                       PT Diagnosis: Decreased ROM, strength, and mobility secondary to post operative R RTC repair      _________________________________________________________________________________  Plan of Treatment:    Frequency/Duration: 1x/week for 8 weeks     Goals:  Goal Identifier #1   Goal Description Pt will demonstrate normal PROM of the R shoulder of >160 degrees for flexion and abduction in order to progress to AROM activities.   Target Date 03/22/18   Date Met      Progress:     Goal Identifier #2   Goal Description Pt will demonstrate AROM of the R shoulder flexion and abduction >160 degrees in order to complete overhead activities such as washing hair and putting dishes away.   Target Date 03/22/18   Date Met      Progress:     Goal Identifier #3   Goal Description Pt will demonstrate ability to sleep for 6 hours in bed without waking to R shoulder pain in order to have restful night of sleep.   Target Date 02/17/18   Date Met  02/05/18   Progress:     Goal Identifier #4   Goal Description Pt will demonstrate ability to don and doff his jacket and clothes with <2/10 R shoulder pain in order to be independent with dressing.   Target Date 03/04/18   Date Met  03/13/18 (No pain just tightness)   Progress:     Progress Toward Goals:   Progress this reporting period: Pt has been seen for a total of 10 visits since the start of therapy and since  surgery.  Pt demonstrates improvement in ROM and activity tolerance since surgery.  He is progressing slowly with occasional flare up of symptoms.  Pt is not using the R UE as much for daily activities due to some pain with reaching out to the side, reaching overhead, and reaching down to the side like buckling his seat belt.  He still reports pain with sleeping on the R side.  Pt is able to sleep in bed, demonstrates improved ROM, and is starting to tolerate weight activities.  However pt continues to struggle with lower trapezius stability to maintain proper scapular mechanics and positioning with shoulder activities.  He has poor inferior and posterior mobility of the R glenohumeral joint.  Pt demonstrates weakness with upright activities.  PT will continue to work with pt on progressing ROM, joint mobility, strength, activity tolerance, and endurance exercises in order to return pt to PLOF.    Certification date from 3/4/2018 to 5/4/2018.    Kathie Palm, PT          I CERTIFY THE NEED FOR THESE SERVICES FURNISHED UNDER        THIS PLAN OF TREATMENT AND WHILE UNDER MY CARE     (Physician co-signature of this document indicates review and certification of the therapy plan).                Referring Provider: Dr. Jose Jones

## 2018-03-27 ENCOUNTER — HOSPITAL ENCOUNTER (OUTPATIENT)
Dept: PHYSICAL THERAPY | Facility: CLINIC | Age: 68
Setting detail: THERAPIES SERIES
End: 2018-03-27
Attending: ORTHOPAEDIC SURGERY
Payer: MEDICARE

## 2018-03-27 PROCEDURE — 97110 THERAPEUTIC EXERCISES: CPT | Mod: GP | Performed by: PHYSICAL THERAPIST

## 2018-03-27 PROCEDURE — 40000718 ZZHC STATISTIC PT DEPARTMENT ORTHO VISIT: Performed by: PHYSICAL THERAPIST

## 2018-03-27 PROCEDURE — 97140 MANUAL THERAPY 1/> REGIONS: CPT | Mod: GP | Performed by: PHYSICAL THERAPIST

## 2018-04-10 ENCOUNTER — HOSPITAL ENCOUNTER (OUTPATIENT)
Dept: PHYSICAL THERAPY | Facility: CLINIC | Age: 68
Setting detail: THERAPIES SERIES
End: 2018-04-10
Attending: ORTHOPAEDIC SURGERY
Payer: MEDICARE

## 2018-04-10 PROCEDURE — 40000718 ZZHC STATISTIC PT DEPARTMENT ORTHO VISIT: Performed by: PHYSICAL THERAPIST

## 2018-04-10 PROCEDURE — 97140 MANUAL THERAPY 1/> REGIONS: CPT | Mod: GP | Performed by: PHYSICAL THERAPIST

## 2018-04-17 ENCOUNTER — HOSPITAL ENCOUNTER (OUTPATIENT)
Dept: PHYSICAL THERAPY | Facility: CLINIC | Age: 68
Setting detail: THERAPIES SERIES
End: 2018-04-17
Attending: ORTHOPAEDIC SURGERY
Payer: MEDICARE

## 2018-04-17 PROCEDURE — 97140 MANUAL THERAPY 1/> REGIONS: CPT | Mod: GP | Performed by: PHYSICAL THERAPIST

## 2018-04-17 PROCEDURE — 40000718 ZZHC STATISTIC PT DEPARTMENT ORTHO VISIT: Performed by: PHYSICAL THERAPIST

## 2018-04-17 PROCEDURE — G8985 CARRY GOAL STATUS: HCPCS | Mod: GP,CH | Performed by: PHYSICAL THERAPIST

## 2018-04-17 PROCEDURE — G8984 CARRY CURRENT STATUS: HCPCS | Mod: GP,CJ | Performed by: PHYSICAL THERAPIST

## 2018-04-17 PROCEDURE — 97110 THERAPEUTIC EXERCISES: CPT | Mod: GP | Performed by: PHYSICAL THERAPIST

## 2018-04-17 NOTE — PROGRESS NOTES
Outpatient Physical Therapy Progress Note     Patient: Miguelangel Graham  : 1950    Beginning/End Dates of Reporting Period:  2018 to 2018    Referring Provider: Dr. Jose Jones    Therapy Diagnosis: Decreased ROM, strength, and mobility secondary to post operative R RTC repair.     Client Self Report: Pt states shoulder has been feeling overall ok.  Occasionally able to get the R arm to reach overhead to get a coffee cup out of cupboard.      Objective Measurements:  Objective Measure: ROM  Details: AROM seated R shoulder: flexion 96 degrees, abduction 89 degrees, ER in neutral 43 degrees.  AAROM seated of R shoulder: flexion 126 degrees, abduction 128 degrees.  Supine AROM of the R shoulder: flexion 133 degrees, abduction 140 degrees.  AAROM supine of R shoulder: flexion 140 degrees, abduction 145 degrees, ER in neutral 48 degrees.  Objective Measure: Palpation  Details: R scapular muscle tightness, teres minor, infraspinatus, and rhomboids.  Objective Measure: Strength  Details: Pt strugges with mid range strength with flexion and abduction.  Pt also has issues with scapular retraction and maintaining the sequencing between lower trap activation with arm raising.  Pt's scapula wants to follow head of humerus into flexion and abduction and not allow the separation to occur to avoid shoulder hiking and joint roll and glide.    Goals:  Goal Identifier #1   Goal Description Pt will demonstrate normal PROM of the R shoulder of >160 degrees for flexion and abduction in order to progress to AROM activities.   Target Date 18   Date Met      Progress:     Goal Identifier #2   Goal Description Pt will demonstrate AROM of the R shoulder flexion and abduction >160 degrees in order to complete overhead activities such as washing hair and putting dishes away.   Target Date 18   Date Met      Progress:     Goal Identifier #3   Goal Description Pt will demonstrate ability to sleep for 6 hours in bed  without waking to R shoulder pain in order to have restful night of sleep.   Target Date 02/17/18   Date Met  02/05/18   Progress:     Goal Identifier #4   Goal Description Pt will demonstrate ability to don and doff his jacket and clothes with <2/10 R shoulder pain in order to be independent with dressing.   Target Date 03/04/18   Date Met  03/13/18 (No pain just tightness)   Progress:     Goal Identifier New Goal #5   Goal Description Pt will demonstrate ability to reach overhead with 10# weight in order to put things on high shelf at home.   Target Date 06/01/18   Date Met      Progress:     Progress Toward Goals:   Progress this reporting period: Pt has been progressing very slowly.  ROM measurements have improved.  He continues to demonstrate tissue and joint tightness limiting full AROM and strength.  Struggles with mid range strength with shoulder flexion and abduction.  Continues to demonstrate shoulder hiking and poor scapular mechanics and sequencing.  Pt will benefit from continued PT services to progress ROM, strength, and scapular stability in order to improve functional activities.    Plan:  Continue therapy per current plan of care.    Discharge:  No    Thank you for your referral.    Kathie Palm, PT, DPT  [facility]  [phone]

## 2018-04-24 ENCOUNTER — HOSPITAL ENCOUNTER (OUTPATIENT)
Dept: PHYSICAL THERAPY | Facility: CLINIC | Age: 68
Setting detail: THERAPIES SERIES
End: 2018-04-24
Attending: ORTHOPAEDIC SURGERY
Payer: MEDICARE

## 2018-04-24 PROCEDURE — 97140 MANUAL THERAPY 1/> REGIONS: CPT | Mod: GP | Performed by: PHYSICAL THERAPIST

## 2018-04-24 PROCEDURE — 97110 THERAPEUTIC EXERCISES: CPT | Mod: GP,KX | Performed by: PHYSICAL THERAPIST

## 2018-04-24 PROCEDURE — 40000718 ZZHC STATISTIC PT DEPARTMENT ORTHO VISIT: Performed by: PHYSICAL THERAPIST

## 2018-04-26 ENCOUNTER — OFFICE VISIT (OUTPATIENT)
Dept: ORTHOPEDICS | Facility: CLINIC | Age: 68
End: 2018-04-26
Payer: COMMERCIAL

## 2018-04-26 VITALS
WEIGHT: 233 LBS | HEIGHT: 71 IN | SYSTOLIC BLOOD PRESSURE: 120 MMHG | DIASTOLIC BLOOD PRESSURE: 70 MMHG | TEMPERATURE: 97.5 F | BODY MASS INDEX: 32.62 KG/M2

## 2018-04-26 DIAGNOSIS — M75.121 COMPLETE TEAR OF RIGHT ROTATOR CUFF: Primary | ICD-10-CM

## 2018-04-26 PROCEDURE — 99212 OFFICE O/P EST SF 10 MIN: CPT | Performed by: ORTHOPAEDIC SURGERY

## 2018-04-26 ASSESSMENT — PAIN SCALES - GENERAL: PAINLEVEL: NO PAIN (0)

## 2018-04-26 NOTE — PROGRESS NOTES
Orthopedic Clinic Post-Operative Note    CHIEF COMPLAINT:   Chief Complaint   Patient presents with     RECHECK     right shoulder follow up      Surgical Followup     DOS:12/12/2017~ Right shoulder arthroscopy with arthroscopic biceps tenodesis, subacromial decompression with partial acromioplasty.  Open right shoulder rotator cuff tendon tear, and distal clavicle excision as well as right shoulder arthroscopy with arthroscopic removal of foreign body (previous suture). ~4 months postop       HISTORY OF PRESENT ILLNESS  Making progress.  He feels physical therapy is going well.  Still is tight.    Patient's past medical, surgical, social and family histories reviewed.     Past Medical History:   Diagnosis Date     PONV (postoperative nausea and vomiting) 12/12/2017    shoulder surgery       Past Surgical History:   Procedure Laterality Date     ARTHROSCOPY SHOULDER BICEPS TENODESIS REPAIR Right 12/12/2017    Procedure: ARTHROSCOPY SHOULDER BICEPS TENODESIS REPAIR;;  Surgeon: Jose Jones DO;  [facility]     ARTHROSCOPY SHOULDER DECOMPRESSION Right 12/12/2017    Procedure: ARTHROSCOPY SHOULDER DECOMPRESSION;;  Surgeon: Jose Jones DO;  [facility]     ARTHROSCOPY SHOULDER DISTAL CLAVICLE REPAIR Right 12/12/2017    Procedure: ARTHROSCOPY SHOULDER DISTAL CLAVICLE RESECTION;;  Surgeon: Jose Jones DO;  [facility]     ARTHROSCOPY SHOULDER, OPEN ROTATOR CUFF REPAIR, COMBINED Right 12/12/2017    Procedure: COMBINED ARTHROSCOPY SHOULDER, OPEN ROTATOR CUFF REPAIR;  right shoulder arthroscopy with open mini rotator cuff repair, biceps tenodesis, distal clavicle and subacromial decompression with partial acromioplasty and removal of foreing body;  Surgeon: Jose Jones DO;  Location[facility]     HC KNEE SCOPE, DIAGNOSTIC      Arthroscopy, Knee     HC REPAIR ROTATOR CUFF,CHRONIC       REMOVE FOREIGN BODY UPPER EXTREMITY Right 12/12/2017    Procedure: REMOVE  "FOREIGN BODY UPPER EXTREMITY;;  Surgeon: Jose Jones DO;  [facility]       Medications:    No current outpatient prescriptions on file prior to visit.  No current facility-administered medications on file prior to visit.     Allergies   Allergen Reactions     No Known Drug Allergies        Social History     Occupational History           Social History Main Topics     Smoking status: Former Smoker     Quit date: 1/1/1985     Smokeless tobacco: Never Used     Alcohol use Yes      Comment: daily      Drug use: No     Sexual activity: Not on file       Family History   Problem Relation Age of Onset     C.A.SAHARA. Brother      EMMY.ARADHA. Brother      EMMY.ARADHA. Brother      EMMY.ARADHA. Brother      KELLY. Brother      DIABETES Sister      DIABETES Sister      DIABETES Brother      DIABETES Brother      DIABETES Brother        REVIEW OF SYSTEMS  General: negative for, night sweats, dizziness, fatigue  Resp: No shortness of breath and no cough  CV: negative for chest pain, syncope or near-syncope  GI: negative for nausea, vomiting and diarrhea  : negative for dysuria and hematuria  Musculoskeletal: as above  Neurologic: negative for syncope   Hematologic: negative for bleeding disorder    Physical Exam:  Vitals: /70 (BP Location: Right arm, Patient Position: Chair, Cuff Size: Adult Large)  Temp 97.5  F (36.4  C) (Temporal)  Ht 5' 11\" (1.803 m)  Wt 233 lb (105.7 kg)  BMI 32.5 kg/m2  BMI= Body mass index is 32.5 kg/(m^2).  Constitutional: healthy, alert and no acute distress   Psychiatric: mentation appears normal and affect normal/bright  NEURO: no focal deficits  SKIN: .well healed, no erythema, no incision breakdown and no drainage.  JOINT/EXTREMITIES: Active motion 90/90.  Passive approximately 160/160.  No focal areas of pain.  Still has some coupled shoulder motion.  Although this seems to be improving  GAIT: not tested     Diagnostic Modalities:  None today.  Independent visualization of " the images was performed.      Impression:   Chief Complaint   Patient presents with     RECHECK     right shoulder follow up      Surgical Followup     DOS:12/12/2017~ Right shoulder arthroscopy with arthroscopic biceps tenodesis, subacromial decompression with partial acromioplasty.  Open right shoulder rotator cuff tendon tear, and distal clavicle excision as well as right shoulder arthroscopy with arthroscopic removal of foreign body (previous suture). ~4 months postop   Making progress.  Although he is tight.    Plan:   Continue physical therapy.  Progress as he tolerates.  As he progressed okay to add some strengthening.    Return to clinic 2, month(s), PRN, or sooner as needed for changes.    Re-x-ray on return: No    Bhupendra Jones D.O.

## 2018-04-26 NOTE — NURSING NOTE
"Chief Complaint   Patient presents with     RECHECK     right shoulder follow up      Surgical Followup     DOS:12/12/2017~ Right shoulder arthroscopy with arthroscopic biceps tenodesis, subacromial decompression with partial acromioplasty.  Open right shoulder rotator cuff tendon tear, and distal clavicle excision as well as right shoulder arthroscopy with arthroscopic removal of foreign body (previous suture). ~4 months postop       Initial /70 (BP Location: Right arm, Patient Position: Chair, Cuff Size: Adult Large)  Temp 97.5  F (36.4  C) (Temporal)  Ht 1.803 m (5' 11\")  Wt 105.7 kg (233 lb)  BMI 32.5 kg/m2 Estimated body mass index is 32.5 kg/(m^2) as calculated from the following:    Height as of this encounter: 1.803 m (5' 11\").    Weight as of this encounter: 105.7 kg (233 lb).  Medication Reconciliation: complete   Marah/ROHINI     "

## 2018-04-26 NOTE — LETTER
4/26/2018         RE: Miguelangel Graham  [address] [address]        Dear Colleague,    Thank you for referring your patient, Miguelangel Graham, to the [facility]. Please see a copy of my visit note below.    Orthopedic Clinic Post-Operative Note    CHIEF COMPLAINT:   Chief Complaint   Patient presents with     RECHECK     right shoulder follow up      Surgical Followup     DOS:12/12/2017~ Right shoulder arthroscopy with arthroscopic biceps tenodesis, subacromial decompression with partial acromioplasty.  Open right shoulder rotator cuff tendon tear, and distal clavicle excision as well as right shoulder arthroscopy with arthroscopic removal of foreign body (previous suture). ~4 months postop       HISTORY OF PRESENT ILLNESS  Making progress.  He feels physical therapy is going well.  Still is tight.    Patient's past medical, surgical, social and family histories reviewed.     Past Medical History:   Diagnosis Date     PONV (postoperative nausea and vomiting) 12/12/2017    shoulder surgery       Past Surgical History:   Procedure Laterality Date     ARTHROSCOPY SHOULDER BICEPS TENODESIS REPAIR Right 12/12/2017    Procedure: ARTHROSCOPY SHOULDER BICEPS TENODESIS REPAIR;;  Surgeon: Jose Jones DO;  [facility]     ARTHROSCOPY SHOULDER DECOMPRESSION Right 12/12/2017    Procedure: ARTHROSCOPY SHOULDER DECOMPRESSION;;  Surgeon: Jose Jones DO;  [facility]     ARTHROSCOPY SHOULDER DISTAL CLAVICLE REPAIR Right 12/12/2017    Procedure: ARTHROSCOPY SHOULDER DISTAL CLAVICLE RESECTION;;  Surgeon: Jose Jones DO;  [facility]     ARTHROSCOPY SHOULDER, OPEN ROTATOR CUFF REPAIR, COMBINED Right 12/12/2017    Procedure: COMBINED ARTHROSCOPY SHOULDER, OPEN ROTATOR CUFF REPAIR;  right shoulder arthroscopy with open mini rotator cuff repair, biceps tenodesis, distal clavicle and subacromial decompression with partial acromioplasty and removal of  "foreing body;  Surgeon: Jose Jones DO;  [facility]     HC KNEE SCOPE, DIAGNOSTIC      Arthroscopy, Knee     HC REPAIR ROTATOR CUFF,CHRONIC       REMOVE FOREIGN BODY UPPER EXTREMITY Right 12/12/2017    Procedure: REMOVE FOREIGN BODY UPPER EXTREMITY;;  Surgeon: Jose Jones DO;  [facility]       Medications:    No current outpatient prescriptions on file prior to visit.  No current facility-administered medications on file prior to visit.     Allergies   Allergen Reactions     No Known Drug Allergies        Social History     Occupational History           Social History Main Topics     Smoking status: Former Smoker     Quit date: 1/1/1985     Smokeless tobacco: Never Used     Alcohol use Yes      Comment: daily      Drug use: No     Sexual activity: Not on file       Family History   Problem Relation Age of Onset     TOÑITO Brother      TOÑITO Brother      TOÑITO Brother      TOÑITO Brother      TOÑITO Brother      DIABETES Sister      DIABETES Sister      DIABETES Brother      DIABETES Brother      DIABETES Brother        REVIEW OF SYSTEMS  General: negative for, night sweats, dizziness, fatigue  Resp: No shortness of breath and no cough  CV: negative for chest pain, syncope or near-syncope  GI: negative for nausea, vomiting and diarrhea  : negative for dysuria and hematuria  Musculoskeletal: as above  Neurologic: negative for syncope   Hematologic: negative for bleeding disorder    Physical Exam:  Vitals: /70 (BP Location: Right arm, Patient Position: Chair, Cuff Size: Adult Large)  Temp 97.5  F (36.4  C) (Temporal)  Ht 5' 11\" (1.803 m)  Wt 233 lb (105.7 kg)  BMI 32.5 kg/m2  BMI= Body mass index is 32.5 kg/(m^2).  Constitutional: healthy, alert and no acute distress   Psychiatric: mentation appears normal and affect normal/bright  NEURO: no focal deficits  SKIN: .well healed, no erythema, no incision breakdown and no drainage.  JOINT/EXTREMITIES: " Active motion 90/90.  Passive approximately 160/160.  No focal areas of pain.  Still has some coupled shoulder motion.  Although this seems to be improving  GAIT: not tested     Diagnostic Modalities:  None today.  Independent visualization of the images was performed.      Impression:   Chief Complaint   Patient presents with     RECHECK     right shoulder follow up      Surgical Followup     DOS:12/12/2017~ Right shoulder arthroscopy with arthroscopic biceps tenodesis, subacromial decompression with partial acromioplasty.  Open right shoulder rotator cuff tendon tear, and distal clavicle excision as well as right shoulder arthroscopy with arthroscopic removal of foreign body (previous suture). ~4 months postop   Making progress.  Although he is tight.    Plan:   Continue physical therapy.  Progress as he tolerates.  As he progressed okay to add some strengthening.    Return to clinic 2, month(s), PRN, or sooner as needed for changes.    Re-x-ray on return: No    Bhupendra Jones D.O.    Again, thank you for allowing me to participate in the care of your patient.        Sincerely,        Jose Jones, DO

## 2018-04-26 NOTE — MR AVS SNAPSHOT
After Visit Summary   4/26/2018    Miguelangel Graham    MRN: [id number]           Patient Information     Date Of Birth          1950        Visit Information        Provider Department      4/26/2018 8:30 AM Jose Jones, DO [facility]         Follow-ups after your visit        Your next 10 appointments already scheduled     May 01, 2018  7:30 AM CDT   Ortho Treatment with Kathie Kneisl, PT   [facility] ([facility])    [address] [place] [address]   [phone]            May 08, 2018  7:30 AM CDT   Ortho Treatment with Kathie Kneisl, PT   [facility] ([facility])    [address] [place] [address]   [phone]            May 22, 2018  7:30 AM CDT   Ortho Treatment with Kathie Kneisl, PT   [facility] ([facility])    [address] [place] [address]   [phone]            May 29, 2018  7:30 AM CDT   Ortho Treatment with Kathie Kneisl, PT   [facility] ([facility])    [address] [place] [address]   [phone]              Who to contact     If you have questions or need follow up information about today's clinic visit or your schedule please contact [facility] directly at [phone].  Normal or non-critical lab and imaging results will be communicated to you by MyChart, letter or phone within 4 business days after the clinic has received the results. If you do not hear from us within 7 days, please contact the clinic through MyChart or phone. If you have a critical or abnormal lab result, we will notify you by phone as soon as possible.  Submit refill requests through [employer] or call your pharmacy and they will forward the refill request to us. Please allow 3 business days for your refill to be completed.          Additional Information About Your  "Visit        Vided[employer]hart Information     "[employer] lets you send messages to your doctor, view your test results, renew your prescriptions, schedule appointments and more. To sign up, go to [email or web address] . Click on \"Log in\" on the left side of the screen, which will take you to the Welcome page. Then click on \"Sign up Now\" on the right side of the page.     You will be asked to enter the access code listed below, as well as some personal information. Please follow the directions to create your username and password.     Your access code is: [email or web address]  Expires: 2018  8:34 AM     Your access code will  in 90 days. If you need help or a new code, please call your [facility] clinic or [phone].        Care EveryWhere ID     This is your Care EveryWhere ID. This could be used by other organizations to access your [facility] medical records  [id number]        Your Vitals Were     Temperature Height BMI (Body Mass Index)             97.5  F (36.4  C) (Temporal) 1.803 m (5' 11\") 32.5 kg/m2          Blood Pressure from Last 3 Encounters:   18 120/70   17 130/75   17 134/80    Weight from Last 3 Encounters:   18 105.7 kg (233 lb)   03/15/18 105.9 kg (233 lb 8 oz)   18 103 kg (227 lb)              Today, you had the following     No orders found for display       Primary Care Provider Fax #    Physician No Ref-Primary [phone]       No address on file        Equal Access to Services     [facility]: Hadii concha Courtney, waaxda luqadaha, qaybta kaalsherwin shah . So [facility] [phone].    ATENCIÓN: Si habla español, tiene a alicea disposición servicios gratuitos de asistencia lingüística. Llame al [phone].    We comply with applicable federal civil rights laws and [place] laws. We do not discriminate on the basis of race, color, national origin, age, disability, sex, sexual orientation, or gender identity.          "   Thank you!     Thank you for choosing [facility]  for your care. Our goal is always to provide you with excellent care. Hearing back from our patients is one way we can continue to improve our services. Please take a few minutes to complete the written survey that you may receive in the mail after your visit with us. Thank you!             Your Updated Medication List - Protect others around you: Learn how to safely use, store and throw away your medicines at www.disposemymeds.org.      Notice  As of 4/26/2018  8:34 AM    You have not been prescribed any medications.

## 2018-05-01 ENCOUNTER — HOSPITAL ENCOUNTER (OUTPATIENT)
Dept: PHYSICAL THERAPY | Facility: CLINIC | Age: 68
Setting detail: THERAPIES SERIES
End: 2018-05-01
Attending: ORTHOPAEDIC SURGERY
Payer: MEDICARE

## 2018-05-01 PROCEDURE — 97110 THERAPEUTIC EXERCISES: CPT | Mod: GP,KX | Performed by: PHYSICAL THERAPIST

## 2018-05-01 PROCEDURE — 97140 MANUAL THERAPY 1/> REGIONS: CPT | Mod: GP,KX | Performed by: PHYSICAL THERAPIST

## 2018-05-01 PROCEDURE — 40000718 ZZHC STATISTIC PT DEPARTMENT ORTHO VISIT: Performed by: PHYSICAL THERAPIST

## 2018-05-08 ENCOUNTER — HOSPITAL ENCOUNTER (OUTPATIENT)
Dept: PHYSICAL THERAPY | Facility: CLINIC | Age: 68
Setting detail: THERAPIES SERIES
End: 2018-05-08
Attending: ORTHOPAEDIC SURGERY
Payer: MEDICARE

## 2018-05-08 PROCEDURE — 40000718 ZZHC STATISTIC PT DEPARTMENT ORTHO VISIT: Performed by: PHYSICAL THERAPIST

## 2018-05-08 PROCEDURE — G8984 CARRY CURRENT STATUS: HCPCS | Mod: GP,CJ | Performed by: PHYSICAL THERAPIST

## 2018-05-08 PROCEDURE — 97140 MANUAL THERAPY 1/> REGIONS: CPT | Mod: GP | Performed by: PHYSICAL THERAPIST

## 2018-05-08 PROCEDURE — G8985 CARRY GOAL STATUS: HCPCS | Mod: GP,CI | Performed by: PHYSICAL THERAPIST

## 2018-05-08 NOTE — ADDENDUM NOTE
Encounter addended by: Kathie Palm PT on: 5/8/2018  4:46 PM<BR>     Actions taken: Charge Capture section accepted

## 2018-05-22 ENCOUNTER — HOSPITAL ENCOUNTER (OUTPATIENT)
Dept: PHYSICAL THERAPY | Facility: CLINIC | Age: 68
Setting detail: THERAPIES SERIES
End: 2018-05-22
Attending: ORTHOPAEDIC SURGERY
Payer: MEDICARE

## 2018-05-22 PROCEDURE — 97110 THERAPEUTIC EXERCISES: CPT | Mod: GP,KX | Performed by: PHYSICAL THERAPIST

## 2018-05-22 PROCEDURE — 97140 MANUAL THERAPY 1/> REGIONS: CPT | Mod: GP,KX | Performed by: PHYSICAL THERAPIST

## 2018-05-22 PROCEDURE — 40000718 ZZHC STATISTIC PT DEPARTMENT ORTHO VISIT: Performed by: PHYSICAL THERAPIST

## 2018-05-29 ENCOUNTER — HOSPITAL ENCOUNTER (OUTPATIENT)
Dept: PHYSICAL THERAPY | Facility: CLINIC | Age: 68
Setting detail: THERAPIES SERIES
End: 2018-05-29
Attending: ORTHOPAEDIC SURGERY
Payer: MEDICARE

## 2018-05-29 PROCEDURE — 40000718 ZZHC STATISTIC PT DEPARTMENT ORTHO VISIT: Performed by: PHYSICAL THERAPIST

## 2018-05-29 PROCEDURE — G8985 CARRY GOAL STATUS: HCPCS | Mod: GP,CH | Performed by: PHYSICAL THERAPIST

## 2018-05-29 PROCEDURE — G8986 CARRY D/C STATUS: HCPCS | Mod: GP,CJ | Performed by: PHYSICAL THERAPIST

## 2018-05-29 PROCEDURE — 97140 MANUAL THERAPY 1/> REGIONS: CPT | Mod: GP,KX | Performed by: PHYSICAL THERAPIST

## 2018-05-29 PROCEDURE — 97110 THERAPEUTIC EXERCISES: CPT | Mod: GP,KX | Performed by: PHYSICAL THERAPIST

## 2018-10-03 NOTE — ADDENDUM NOTE
Encounter addended by: Kathie Palm PT on: 10/3/2018 10:56 AM<BR>     Actions taken: Episode resolved, Pend clinical note, Flowsheet accepted, Sign clinical note

## 2018-10-25 ENCOUNTER — OFFICE VISIT (OUTPATIENT)
Dept: FAMILY MEDICINE | Facility: OTHER | Age: 68
End: 2018-10-25
Payer: COMMERCIAL

## 2018-10-25 VITALS
HEIGHT: 71 IN | TEMPERATURE: 97.8 F | OXYGEN SATURATION: 94 % | BODY MASS INDEX: 30.52 KG/M2 | HEART RATE: 92 BPM | WEIGHT: 218 LBS | DIASTOLIC BLOOD PRESSURE: 72 MMHG | RESPIRATION RATE: 20 BRPM | SYSTOLIC BLOOD PRESSURE: 132 MMHG

## 2018-10-25 DIAGNOSIS — R10.9 FLANK PAIN: ICD-10-CM

## 2018-10-25 DIAGNOSIS — N21.9 CALCULUS OF LOWER URINARY TRACT: Primary | ICD-10-CM

## 2018-10-25 DIAGNOSIS — Z23 NEED FOR PROPHYLACTIC VACCINATION AND INOCULATION AGAINST INFLUENZA: ICD-10-CM

## 2018-10-25 LAB
ALBUMIN UR-MCNC: NEGATIVE MG/DL
APPEARANCE UR: CLEAR
BILIRUB UR QL STRIP: NEGATIVE
COLOR UR AUTO: YELLOW
GLUCOSE UR STRIP-MCNC: NEGATIVE MG/DL
HGB UR QL STRIP: ABNORMAL
KETONES UR STRIP-MCNC: NEGATIVE MG/DL
LEUKOCYTE ESTERASE UR QL STRIP: NEGATIVE
MUCOUS THREADS #/AREA URNS LPF: PRESENT /LPF
NITRATE UR QL: NEGATIVE
PH UR STRIP: 7.5 PH (ref 5–7)
RBC #/AREA URNS AUTO: ABNORMAL /HPF
SOURCE: ABNORMAL
SP GR UR STRIP: 1.02 (ref 1–1.03)
UROBILINOGEN UR STRIP-ACNC: 0.2 EU/DL (ref 0.2–1)
WBC #/AREA URNS AUTO: ABNORMAL /HPF

## 2018-10-25 PROCEDURE — 90662 IIV NO PRSV INCREASED AG IM: CPT | Performed by: NURSE PRACTITIONER

## 2018-10-25 PROCEDURE — 81001 URINALYSIS AUTO W/SCOPE: CPT | Performed by: NURSE PRACTITIONER

## 2018-10-25 PROCEDURE — G0008 ADMIN INFLUENZA VIRUS VAC: HCPCS | Performed by: NURSE PRACTITIONER

## 2018-10-25 PROCEDURE — 99214 OFFICE O/P EST MOD 30 MIN: CPT | Mod: 25 | Performed by: NURSE PRACTITIONER

## 2018-10-25 RX ORDER — TAMSULOSIN HYDROCHLORIDE 0.4 MG/1
0.4 CAPSULE ORAL DAILY
Qty: 30 CAPSULE | Refills: 0 | Status: SHIPPED | OUTPATIENT
Start: 2018-10-25 | End: 2019-01-22

## 2018-10-25 ASSESSMENT — PAIN SCALES - GENERAL: PAINLEVEL: MILD PAIN (2)

## 2018-10-25 NOTE — PROGRESS NOTES
Injectable Influenza Immunization Documentation    Prior to injection verified patient identity using patient's name and date of birth.  Due to injection administration, patient instructed to remain in clinic for 15 minutes  afterwards, and to report any adverse reaction to me immediately.      1.  Is the person to be vaccinated sick today?   No    2. Does the person to be vaccinated have an allergy to a component   of the vaccine?   No  Egg Allergy Algorithm Link    3. Has the person to be vaccinated ever had a serious reaction   to influenza vaccine in the past?   No    4. Has the person to be vaccinated ever had Guillain-Barré syndrome?   No    Form completed by ................Joe Amaya LPN,   October 25, 2018,      12:19 PM,   [facility]

## 2018-10-25 NOTE — MR AVS SNAPSHOT
After Visit Summary   10/25/2018    Miguelangel Graham    MRN: [id number]           Patient Information     Date Of Birth          1950        Visit Information        Provider Department      10/25/2018 11:20 AM Mariah Roberts APRN CNP [facility]        Today's Diagnoses     Calculus of lower urinary tract    -  1    Flank pain          Care Instructions    Take the Flomax once a day to help the stone pass.  If this fails to pass within the next few days, or if you get severe pain again, contact the clinic.     You should schedule a physical and be fasting for labs for that.                 Follow-ups after your visit        Follow-up notes from your care team     Return in about 4 weeks (around 11/22/2018) for Physical Exam.      Future tests that were ordered for you today     Open Future Orders        Priority Expected Expires Ordered    UA reflex to Microscopic and Culture Routine  10/25/2019 10/25/2018            Who to contact     If you have questions or need follow up information about today's clinic visit or your schedule please contact [facility] directly at [phone].  Normal or non-critical lab and imaging results will be communicated to you by MyChart, letter or phone within 4 business days after the clinic has received the results. If you do not hear from us within 7 days, please contact the clinic through MyChart or phone. If you have a critical or abnormal lab result, we will notify you by phone as soon as possible.  Submit refill requests through [employer] or call your pharmacy and they will forward the refill request to us. Please allow 3 business days for your refill to be completed.          Additional Information About Your Visit        Care EveryWhere ID     This is your Care EveryWhere ID. This could be used by other organizations to access your [facility] medical records  [id number]        Your Vitals Were     Pulse Temperature Respirations Height  "Pulse Oximetry BMI (Body Mass Index)    92 97.8  F (36.6  C) (Tympanic) 20 5' 10.5\" (1.791 m) 94% 30.84 kg/m2       Blood Pressure from Last 3 Encounters:   10/25/18 132/72   04/26/18 120/70   12/12/17 130/75    Weight from Last 3 Encounters:   10/25/18 218 lb (98.9 kg)   04/26/18 233 lb (105.7 kg)   03/15/18 233 lb 8 oz (105.9 kg)              We Performed the Following     *UA reflex to Microscopic and Culture ([place] and [facility] (except [place] and [place])     Urine Microscopic          Today's Medication Changes          These changes are accurate as of 10/25/18 12:03 PM.  If you have any questions, ask your nurse or doctor.               Start taking these medicines.        Dose/Directions    tamsulosin 0.4 MG capsule   Commonly known as:  FLOMAX   Used for:  Calculus of lower urinary tract   Started by:  Mariah Roberts APRN CNP        Dose:  0.4 mg   Take 1 capsule (0.4 mg) by mouth daily   Quantity:  30 capsule   Refills:  0            Where to get your medicines      These medications were sent to [facility] [place] [id number]   [address] [facility] [address]     Phone:  [phone]     tamsulosin 0.4 MG capsule                Primary Care Provider Fax #    Physician No Ref-Primary [phone]       No address on file        Equal Access to Services     [facility]: Hadii concha morrowo Sorogerioali, waaxda luqadaha, qaybta kaalmada adeegyada, waxay cassandra varghese. So [facility] [phone].    ATENCIÓN: Si habla español, tiene a alicea disposición servicios gratuitos de asistencia lingüística. Llame al [phone].    We comply with applicable federal civil rights laws and [place] laws. We do not discriminate on the basis of race, color, national origin, age, disability, sex, sexual orientation, or gender identity.            Thank you!     Thank you for choosing [facility]  for your care. Our goal is always to provide you " with excellent care. Hearing back from our patients is one way we can continue to improve our services. Please take a few minutes to complete the written survey that you may receive in the mail after your visit with us. Thank you!             Your Updated Medication List - Protect others around you: Learn how to safely use, store and throw away your medicines at www.disposemymeds.org.          This list is accurate as of 10/25/18 12:03 PM.  Always use your most recent med list.                   Brand Name Dispense Instructions for use Diagnosis    tamsulosin 0.4 MG capsule    FLOMAX    30 capsule    Take 1 capsule (0.4 mg) by mouth daily    Calculus of lower urinary tract

## 2018-10-25 NOTE — PATIENT INSTRUCTIONS
Take the Flomax once a day to help the stone pass.  If this fails to pass within the next few days, or if you get severe pain again, contact the clinic.     You should schedule a physical and be fasting for labs for that.

## 2018-10-25 NOTE — PROGRESS NOTES
SUBJECTIVE:   Miguelangel Graham is a 68 year old male who presents to clinic today for the following health issues:      Genitourinary symptoms      Duration: 1 day    Description:  frequency, back pain and possible kidney stone    Intensity:  mild    Accompanying signs and symptoms (fever/discharge/nausea/vomiting/back or abdominal pain):  None    History (frequent UTI's/kidney stones/prostate problems): Hx of kidney stones  Sexually active: YES    Precipitating or alleviating factors: increasing fluids    Therapies tried and outcome: increase fluid intake   Outcome: slightly better today    He had severe right flank pain yesterday to the point he vomited several times.  This flank pain has improved today, but he now has right groin area pain.  No bulging.  No testicular pain or penile discharge.  He has also noticed urinary frequency and hesitancy over the past 24 hours.  No hematuria.  Has had 3 previous kidney stones and he states this feels exactly the same.  He does not feel he has passed the stone.  He has passed the previous stones at home without intervention.  No fevers/chills.  Is otherwise fairly healthy, rarely seeks medical care.     Problem list and histories reviewed & adjusted, as indicated.  Additional history: as documented    Patient Active Problem List   Diagnosis     CARDIOVASCULAR SCREENING; LDL GOAL LESS THAN 160     AC joint arthropathy     Biceps tendonitis, right     Complete tear of right rotator cuff     Primary osteoarthritis of right hip     S/P rotator cuff repair     Past Surgical History:   Procedure Laterality Date     ARTHROSCOPY SHOULDER BICEPS TENODESIS REPAIR Right 12/12/2017    Procedure: ARTHROSCOPY SHOULDER BICEPS TENODESIS REPAIR;;  Surgeon: Jose Jones DO;  Location[facility]     ARTHROSCOPY SHOULDER DECOMPRESSION Right 12/12/2017    Procedure: ARTHROSCOPY SHOULDER DECOMPRESSION;;  Surgeon: Jose Jones DO;  Location[facility]     ARTHROSCOPY SHOULDER  DISTAL CLAVICLE REPAIR Right 12/12/2017    Procedure: ARTHROSCOPY SHOULDER DISTAL CLAVICLE RESECTION;;  Surgeon: Jose Jones DO;  Location[facility]     ARTHROSCOPY SHOULDER, OPEN ROTATOR CUFF REPAIR, COMBINED Right 12/12/2017    Procedure: COMBINED ARTHROSCOPY SHOULDER, OPEN ROTATOR CUFF REPAIR;  right shoulder arthroscopy with open mini rotator cuff repair, biceps tenodesis, distal clavicle and subacromial decompression with partial acromioplasty and removal of foreing body;  Surgeon: Jose Jones DO;  Location: [facility]     HC KNEE SCOPE, DIAGNOSTIC      Arthroscopy, Knee     HC REPAIR ROTATOR CUFF,CHRONIC       REMOVE FOREIGN BODY UPPER EXTREMITY Right 12/12/2017    Procedure: REMOVE FOREIGN BODY UPPER EXTREMITY;;  Surgeon: Jose Jones DO;  Location: [facility]       Social History   Substance Use Topics     Smoking status: Former Smoker     Quit date: 1/1/1985     Smokeless tobacco: Never Used     Alcohol use 8.4 oz/week     7 Cans of beer, 7 Shots of liquor per week      Comment: 2 daily     Family History   Problem Relation Age of Onset     C.A.SAHARA. Brother      C.A.SAHARA. Brother      C.AROMI Brother      C.AROMI Brother      EMMY.ARADHA. Brother      Diabetes Sister      Diabetes Sister      Diabetes Brother      Diabetes Brother      Diabetes Brother          Current Outpatient Prescriptions   Medication Sig Dispense Refill     No medications         Allergies   Allergen Reactions     No Known Drug Allergies      BP Readings from Last 3 Encounters:   10/25/18 132/72   04/26/18 120/70   12/12/17 130/75    Wt Readings from Last 3 Encounters:   10/25/18 218 lb (98.9 kg)   04/26/18 233 lb (105.7 kg)   03/15/18 233 lb 8 oz (105.9 kg)                    Reviewed and updated as needed this visit by clinical staff  Tobacco  Allergies  Meds  Med Hx  Surg Hx  Fam Hx  Soc Hx      Reviewed and updated as needed this visit by Provider         ROS:  Constitutional, HEENT, cardiovascular, pulmonary,  "GI, , musculoskeletal, neuro, skin, endocrine and psych systems are negative, except as otherwise noted.    OBJECTIVE:     /72  Pulse 92  Temp 97.8  F (36.6  C) (Tympanic)  Resp 20  Ht 5' 10.5\" (1.791 m)  Wt 218 lb (98.9 kg)  SpO2 94%  BMI 30.84 kg/m2  Body mass index is 30.84 kg/(m^2).  GENERAL: healthy, alert and no distress  NECK: no adenopathy, no asymmetry, masses, or scars and thyroid normal to palpation  RESP: lungs clear to auscultation - no rales, rhonchi or wheezes  CV: regular rate and rhythm, normal S1 S2, no S3 or S4, no murmur, click or rub, no peripheral edema and peripheral pulses strong  ABDOMEN: soft, nontender, no hepatosplenomegaly, no masses and bowel sounds normal  MS: no gross musculoskeletal defects noted, no edema    Diagnostic Test Results:  Results for orders placed or performed in visit on 10/25/18 (from the past 24 hour(s))   *UA reflex to Microscopic and Culture ([place] and [facility] (except [place] and [place])   Result Value Ref Range    Color Urine Yellow     Appearance Urine Clear     Glucose Urine Negative NEG^Negative mg/dL    Bilirubin Urine Negative NEG^Negative    Ketones Urine Negative NEG^Negative mg/dL    Specific Gravity Urine 1.020 1.003 - 1.035    Blood Urine Moderate (A) NEG^Negative    pH Urine 7.5 (H) 5.0 - 7.0 pH    Protein Albumin Urine Negative NEG^Negative mg/dL    Urobilinogen Urine 0.2 0.2 - 1.0 EU/dL    Nitrite Urine Negative NEG^Negative    Leukocyte Esterase Urine Negative NEG^Negative    Source Midstream Urine    Urine Microscopic   Result Value Ref Range    WBC Urine 0 - 5 OTO5^0 - 5 /HPF    RBC Urine 25-50 (A) OTO2^O - 2 /HPF    Mucous Urine Present (A) NEG^Negative /LPF       ASSESSMENT/PLAN:       1. Calculus of lower urinary tract  Symptoms are consistent with a kidney stone.  His pain is much improved compared to yesterday and I suspect he has either passed the stone already or will pass this shortly.  Will treat with Flomax and " hopefully he will pass this within the next few days.  He will contact the clinic if this is not the case, his pain worsens again or new symptoms develop.   - tamsulosin (FLOMAX) 0.4 MG capsule; Take 1 capsule (0.4 mg) by mouth daily  Dispense: 30 capsule; Refill: 0    2. Flank pain  - UA reflex to Microscopic and Culture; Future  - *UA reflex to Microscopic and Culture ([place] and [facility] (except [place] and [place])  - Urine Microscopic    3. Need for prophylactic vaccination and inoculation against influenza  - FLU VACCINE, INCREASED ANTIGEN, PRESV FREE, AGE 65+ [19092]  - Vaccine Administration, Initial [87989]    See Patient Instructions  He was advised to schedule a full physical as he is due.     POONAM Euceda [facility]

## 2019-01-22 ENCOUNTER — OFFICE VISIT (OUTPATIENT)
Dept: FAMILY MEDICINE | Facility: CLINIC | Age: 69
End: 2019-01-22
Payer: COMMERCIAL

## 2019-01-22 VITALS
SYSTOLIC BLOOD PRESSURE: 130 MMHG | DIASTOLIC BLOOD PRESSURE: 80 MMHG | TEMPERATURE: 97.9 F | WEIGHT: 226.4 LBS | BODY MASS INDEX: 30.66 KG/M2 | OXYGEN SATURATION: 95 % | HEART RATE: 100 BPM | HEIGHT: 72 IN | RESPIRATION RATE: 22 BRPM

## 2019-01-22 DIAGNOSIS — H01.004 BLEPHARITIS OF LEFT UPPER EYELID, UNSPECIFIED TYPE: Primary | ICD-10-CM

## 2019-01-22 PROCEDURE — 99213 OFFICE O/P EST LOW 20 MIN: CPT | Performed by: NURSE PRACTITIONER

## 2019-01-22 RX ORDER — CEPHALEXIN 500 MG/1
500 CAPSULE ORAL 2 TIMES DAILY
Qty: 20 CAPSULE | Refills: 0 | Status: SHIPPED | OUTPATIENT
Start: 2019-01-22 | End: 2019-02-01

## 2019-01-22 RX ORDER — ERYTHROMYCIN 5 MG/G
OINTMENT OPHTHALMIC
Qty: 1 TUBE | Refills: 0 | Status: SHIPPED | OUTPATIENT
Start: 2019-01-22 | End: 2019-01-22

## 2019-01-22 ASSESSMENT — PAIN SCALES - GENERAL: PAINLEVEL: NO PAIN (0)

## 2019-01-22 ASSESSMENT — MIFFLIN-ST. JEOR: SCORE: 1844.94

## 2019-01-22 NOTE — PROGRESS NOTES
SUBJECTIVE:   Miguelangel Graham is a 68 year old male who presents to clinic today for the following health issues:      Eye(s) Problem  Onset: couple days    Description:   Location: right  Pain: YES- upper eyelid  Redness: YES- only upper eyelid    Accompanying Signs & Symptoms:  Discharge/mattering: YES  Swelling: YES  Visual changes: no  Fever: no  Nasal Congestion: no  Bothered by bright lights: no    History:   Trauma: no   Foreign body exposure: no    Precipitating factors:   Wearing contacts: no    Alleviating factors:  Improved by: warm washcloth    Therapies Tried and outcome: warm washcloth    Patient is seen in clinic today with redness and swelling of the upper left eyelid, tenderness at the lash line.  He denies any eye pain or visual disturbance.  He has had no tearing or mattering from the eye.  Does not have any sensation of foreign body.  Discomfort is isolated to the upper lid only    Problem list and histories reviewed & adjusted, as indicated.  Additional history: as documented    BP Readings from Last 3 Encounters:   01/22/19 130/80   10/25/18 132/72   04/26/18 120/70    Wt Readings from Last 3 Encounters:   01/22/19 102.7 kg (226 lb 6.4 oz)   10/25/18 98.9 kg (218 lb)   04/26/18 105.7 kg (233 lb)                    Reviewed and updated as needed this visit by clinical staff       Reviewed and updated as needed this visit by Provider         ROS:  Constitutional, HEENT, cardiovascular, pulmonary, gi and gu systems are negative, except as otherwise noted.    OBJECTIVE:     /80   Pulse 100   Temp 97.9  F (36.6  C) (Temporal)   Resp 22   Ht 1.829 m (6')   Wt 102.7 kg (226 lb 6.4 oz)   SpO2 95%   BMI 30.71 kg/m    Body mass index is 30.71 kg/m .   GENERAL: healthy, alert and no distress  EYES: The conjunctiva of both eyes is clear, corneas are clear.  The upper lid on the left is mildly edematous and erythematous.  There is some tenderness to palpation just along the lash line.  The lid  was everted.  There is no presence of foreign body, no swelling, no evidence of chalazion or meibomian cyst.              ASSESSMENT/PLAN:     Problem List Items Addressed This Visit     None      Visit Diagnoses     Blepharitis of left upper eyelid, unspecified type    -  Primary    Relevant Medications    cephALEXin (KEFLEX) 500 MG capsule           At this appears to be confined to the lash line.  I initially ordered erythromycin ointment, this was not available.  We will have him take Keflex twice daily for 10 days.  Apply warm wet washcloth at frequent intervals to the left eye.  Follow-up in clinic if this fails to improve over the course of the next several days    POONAM Reynoso CNP  [facility]

## 2019-02-07 ENCOUNTER — OFFICE VISIT (OUTPATIENT)
Dept: FAMILY MEDICINE | Facility: CLINIC | Age: 69
End: 2019-02-07
Payer: COMMERCIAL

## 2019-02-07 VITALS
WEIGHT: 222.2 LBS | RESPIRATION RATE: 14 BRPM | BODY MASS INDEX: 30.14 KG/M2 | OXYGEN SATURATION: 99 % | SYSTOLIC BLOOD PRESSURE: 126 MMHG | TEMPERATURE: 97.9 F | DIASTOLIC BLOOD PRESSURE: 72 MMHG | HEART RATE: 89 BPM

## 2019-02-07 DIAGNOSIS — Z12.5 SCREENING FOR PROSTATE CANCER: ICD-10-CM

## 2019-02-07 DIAGNOSIS — Z13.6 CARDIOVASCULAR SCREENING; LDL GOAL LESS THAN 160: ICD-10-CM

## 2019-02-07 DIAGNOSIS — Z00.00 ROUTINE HISTORY AND PHYSICAL EXAMINATION OF ADULT: Primary | ICD-10-CM

## 2019-02-07 DIAGNOSIS — N20.0 KIDNEY STONE: ICD-10-CM

## 2019-02-07 LAB
ALBUMIN SERPL-MCNC: 3.7 G/DL (ref 3.4–5)
ALBUMIN UR-MCNC: NEGATIVE MG/DL
ALP SERPL-CCNC: 49 U/L (ref 40–150)
ALT SERPL W P-5'-P-CCNC: 29 U/L (ref 0–70)
ANION GAP SERPL CALCULATED.3IONS-SCNC: 6 MMOL/L (ref 3–14)
APPEARANCE UR: CLEAR
AST SERPL W P-5'-P-CCNC: 19 U/L (ref 0–45)
BILIRUB SERPL-MCNC: 0.5 MG/DL (ref 0.2–1.3)
BILIRUB UR QL STRIP: NEGATIVE
BUN SERPL-MCNC: 11 MG/DL (ref 7–30)
CALCIUM SERPL-MCNC: 8.7 MG/DL (ref 8.5–10.1)
CHLORIDE SERPL-SCNC: 107 MMOL/L (ref 94–109)
CHOLEST SERPL-MCNC: 190 MG/DL
CO2 SERPL-SCNC: 26 MMOL/L (ref 20–32)
COLOR UR AUTO: YELLOW
CREAT SERPL-MCNC: 0.95 MG/DL (ref 0.66–1.25)
GFR SERPL CREATININE-BSD FRML MDRD: 83 ML/MIN/{1.73_M2}
GLUCOSE SERPL-MCNC: 105 MG/DL (ref 70–99)
GLUCOSE UR STRIP-MCNC: NEGATIVE MG/DL
HDLC SERPL-MCNC: 47 MG/DL
HGB UR QL STRIP: NEGATIVE
KETONES UR STRIP-MCNC: NEGATIVE MG/DL
LDLC SERPL CALC-MCNC: 108 MG/DL
LEUKOCYTE ESTERASE UR QL STRIP: NEGATIVE
NITRATE UR QL: NEGATIVE
NONHDLC SERPL-MCNC: 143 MG/DL
PH UR STRIP: 5 PH (ref 5–7)
POTASSIUM SERPL-SCNC: 4.2 MMOL/L (ref 3.4–5.3)
PROT SERPL-MCNC: 8 G/DL (ref 6.8–8.8)
PSA SERPL-ACNC: 0.64 UG/L (ref 0–4)
SODIUM SERPL-SCNC: 139 MMOL/L (ref 133–144)
SOURCE: NORMAL
SP GR UR STRIP: 1.02 (ref 1–1.03)
TRIGL SERPL-MCNC: 173 MG/DL
UROBILINOGEN UR STRIP-MCNC: 0 MG/DL (ref 0–2)

## 2019-02-07 PROCEDURE — G0103 PSA SCREENING: HCPCS | Performed by: FAMILY MEDICINE

## 2019-02-07 PROCEDURE — 81003 URINALYSIS AUTO W/O SCOPE: CPT | Performed by: FAMILY MEDICINE

## 2019-02-07 PROCEDURE — 80061 LIPID PANEL: CPT | Performed by: FAMILY MEDICINE

## 2019-02-07 PROCEDURE — G0438 PPPS, INITIAL VISIT: HCPCS | Performed by: FAMILY MEDICINE

## 2019-02-07 PROCEDURE — 36415 COLL VENOUS BLD VENIPUNCTURE: CPT | Performed by: FAMILY MEDICINE

## 2019-02-07 PROCEDURE — 80053 COMPREHEN METABOLIC PANEL: CPT | Performed by: FAMILY MEDICINE

## 2019-02-07 NOTE — PROGRESS NOTES
"  SUBJECTIVE:   Miguelangel Graham is a 68 year old male who presents for Preventive Visit.      Are you in the first 12 months of your Medicare Part B coverage?  No    Physical Health:    In general, how would you rate your overall physical health? good    Outside of work, how many days during the week do you exercise? 2-3 days/week    Outside of work, approximately how many minutes a day do you exercise?15-30 minutes    If you drink alcohol do you typically have >3 drinks per day or >7 drinks per week? No    Do you usually eat at least 4 servings of fruit and vegetables a day, include whole grains & fiber and avoid regularly eating high fat or \"junk\" foods? NO    Do you have any problems taking medications regularly?  No    Do you have any side effects from medications? none    Needs assistance for the following daily activities: no assistance needed    Which of the following safety concerns are present in your home?  none identified     Hearing impairment: No    In the past 6 months, have you been bothered by leaking of urine? no    Mental Health:    In general, how would you rate your overall mental or emotional health? good  PHQ-2 Score:      Do you feel safe in your environment? Yes    Do you have a Health Care Directive? No: Advance care planning was reviewed with patient; patient declined at this time.    Additional concerns to address?  Right shoulder has not regained full mobility but lack of raising above shoulder level since his surgery.    Fall risk:  Fallen 2 or more times in the past year?: No  Any fall with injury in the past year?: No    Cognitive Screenin) Repeat 3 items (Leader, Season, Table)    2) Clock draw: NORMAL  3) 3 item recall: Recalls 3 objects  Results: NORMAL clock, 1-2 items recalled: COGNITIVE IMPAIRMENT LESS LIKELY    Mini-CogTM Copyright S Ivette. Licensed by the author for use in [facility]; reprinted with permission (alejandro@.[facility]). All rights reserved.      Do you " have sleep apnea, excessive snoring or daytime drowsiness?: yes            Reviewed and updated as needed this visit by clinical staff         Reviewed and updated as needed this visit by Provider        Social History     Tobacco Use     Smoking status: Former Smoker     Last attempt to quit: 1985     Years since quittin.1     Smokeless tobacco: Never Used   Substance Use Topics     Alcohol use: Yes     Alcohol/week: 8.4 oz     Types: 7 Cans of beer, 7 Shots of liquor per week     Comment: 2 daily                           Current providers sharing in care for this patient include:   Patient Care Team:  No Ref-Primary, Physician as PCP - Katty Barbosa APRN CNP as PCP - Assigned PCP    The following health maintenance items are reviewed in Epic and correct as of today:  Health Maintenance   Topic Date Due     HEPATITIS C SCREENING  1970     ZOSTER IMMUNIZATION (1 of 2) 2002     ADVANCE DIRECTIVE PLANNING Q5 YRS  2007     PNEUMOCOCCAL IMMUNIZATION 65+ LOW/MEDIUM RISK (1 of 2 - PCV13) 2017     AORTIC ANEURYSM SCREENING (SYSTEM ASSIGNED)  2017     LIPID SCREEN Q5 YR MALE (SYSTEM ASSIGNED)  2018     FALL RISK ASSESSMENT  2018     COLON CANCER SCREEN (SYSTEM ASSIGNED)  2019     DTAP/TDAP/TD IMMUNIZATION (2 - Td) 2019     PHQ-2 Q1 YR  2020     INFLUENZA VACCINE  Completed     IPV IMMUNIZATION  Aged Out     MENINGITIS IMMUNIZATION  Aged Out           ROS:  Constitutional, HEENT, cardiovascular, pulmonary, GI, , musculoskeletal, neuro, skin, endocrine and psych systems are negative, except as otherwise noted.    OBJECTIVE:   There were no vitals taken for this visit. Estimated body mass index is 30.71 kg/m  as calculated from the following:    Height as of 19: 1.829 m (6').    Weight as of 19: 102.7 kg (226 lb 6.4 oz).  EXAM:   GENERAL: healthy, alert and no distress  EYES: Eyes grossly normal to inspection, PERRL and  conjunctivae and sclerae normal  HENT: ear canals and TM's normal, nose and mouth without ulcers or lesions  NECK: no adenopathy, no asymmetry, masses, or scars and thyroid normal to palpation  RESP: lungs clear to auscultation - no rales, rhonchi or wheezes  CV: regular rate and rhythm, normal S1 S2, no S3 or S4, no murmur, click or rub, no peripheral edema and peripheral pulses strong  ABDOMEN: soft, nontender, no hepatosplenomegaly, no masses and bowel sounds normal  MS: Decreased range of motion of the right shoulder.  SKIN: no suspicious lesions or rashes  NEURO: Normal strength and tone, mentation intact and speech normal  PSYCH: mentation appears normal, affect normal/bright    Diagnostic Test Results:  Results for orders placed or performed in visit on 02/07/19 (from the past 24 hour(s))   PSA, screen   Result Value Ref Range    PSA 0.64 0 - 4 ug/L   Lipid panel reflex to direct LDL Fasting   Result Value Ref Range    Cholesterol 190 <200 mg/dL    Triglycerides 173 (H) <150 mg/dL    HDL Cholesterol 47 >39 mg/dL    LDL Cholesterol Calculated 108 (H) <100 mg/dL    Non HDL Cholesterol 143 (H) <130 mg/dL   Comprehensive metabolic panel (BMP + Alb, Alk Phos, ALT, AST, Total. Bili, TP)   Result Value Ref Range    Sodium 139 133 - 144 mmol/L    Potassium 4.2 3.4 - 5.3 mmol/L    Chloride 107 94 - 109 mmol/L    Carbon Dioxide 26 20 - 32 mmol/L    Anion Gap 6 3 - 14 mmol/L    Glucose 105 (H) 70 - 99 mg/dL    Urea Nitrogen 11 7 - 30 mg/dL    Creatinine 0.95 0.66 - 1.25 mg/dL    GFR Estimate 83 >60 mL/min/[1.73_m2]    GFR Estimate If Black >90 >60 mL/min/[1.73_m2]    Calcium 8.7 8.5 - 10.1 mg/dL    Bilirubin Total 0.5 0.2 - 1.3 mg/dL    Albumin 3.7 3.4 - 5.0 g/dL    Protein Total 8.0 6.8 - 8.8 g/dL    Alkaline Phosphatase 49 40 - 150 U/L    ALT 29 0 - 70 U/L    AST 19 0 - 45 U/L   *UA reflex to Microscopic and Culture ([place]; [facility]; [facility]; [facility]; [facility]; [facility]; [employer];  Range)   Result Value Ref Range    Color Urine Yellow     Appearance Urine Clear     Glucose Urine Negative NEG^Negative mg/dL    Bilirubin Urine Negative NEG^Negative    Ketones Urine Negative NEG^Negative mg/dL    Specific Gravity Urine 1.020 1.003 - 1.035    Blood Urine Negative NEG^Negative    pH Urine 5.0 5.0 - 7.0 pH    Protein Albumin Urine Negative NEG^Negative mg/dL    Urobilinogen mg/dL 0.0 0.0 - 2.0 mg/dL    Nitrite Urine Negative NEG^Negative    Leukocyte Esterase Urine Negative NEG^Negative    Source Midstream Urine        ASSESSMENT / PLAN:   1. Routine history and physical examination of adult  Generally healthy.  Was seen several weeks ago for blepharitis on the left eyelid still having a little trouble but much better.  - Comprehensive metabolic panel (BMP + Alb, Alk Phos, ALT, AST, Total. Bili, TP)    2. CARDIOVASCULAR SCREENING; LDL GOAL LESS THAN 160  He has not had this done for about 5 years.  We will notify with results.  - Lipid panel reflex to direct LDL Fasting    3. Screening for prostate cancer  We will notify him with results.  - PSA, screen    4. Kidney stone  Has had this in the past a week ago he had some mid back pain was not sure if he had another stone or not.  - *UA reflex to Microscopic and Culture ([place]; [facility]; [facility]; [facility]; [facility]; [facility]; [place]; [place])    End of Life Planning:  Patient currently has an advanced directive: Yes.  Practitioner is supportive of decision.    COUNSELING:  Reviewed preventive health counseling, as reflected in patient instructions       Regular exercise       Healthy diet/nutrition       Vision screening    BP Readings from Last 1 Encounters:   01/22/19 130/80     Estimated body mass index is 30.71 kg/m  as calculated from the following:    Height as of 1/22/19: 1.829 m (6').    Weight as of 1/22/19: 102.7 kg (226 lb 6.4 oz).      Weight management plan: Discussed healthy diet and exercise  guidelines     reports that he quit smoking about 34 years ago. he has never used smokeless tobacco.      Appropriate preventive services were discussed with this patient, including applicable screening as appropriate for cardiovascular disease, diabetes, osteopenia/osteoporosis, and glaucoma.  As appropriate for age/gender, discussed screening for colorectal cancer, prostate cancer, breast cancer, and cervical cancer. Checklist reviewing preventive services available has been given to the patient.    Reviewed patients plan of care and provided an AVS. The Basic Care Plan (routine screening as documented in Health Maintenance) for Miguelangel meets the Care Plan requirement. This Care Plan has been established and reviewed with the Patient.    Counseling Resources:  ATP IV Guidelines  Pooled Cohorts Equation Calculator  Breast Cancer Risk Calculator  FRAX Risk Assessment  ICSI Preventive Guidelines  Dietary Guidelines for Americans, 2010  USDA's MyPlate  ASA Prophylaxis  Lung CA Screening    Gregory Horowitz MD  [facility]

## 2019-02-07 NOTE — PATIENT INSTRUCTIONS
Preventive Health Recommendations:     See your health care provider every year to    Review health changes.     Discuss preventive care.      Review your medicines if your doctor has prescribed any.      Talk with your health care provider about whether you should have a test to screen for prostate cancer (PSA).    Every 3 years, have a diabetes test (fasting glucose). If you are at risk for diabetes, you should have this test more often.    Every 5 years, have a cholesterol test. Have this test more often if you are at risk for high cholesterol or heart disease.     Every 10 years, have a colonoscopy. Or, have a yearly FIT test (stool test). These exams will check for colon cancer.    Talk to with your health care provider about screening for Abdominal Aortic Aneurysm if you have a family history of AAA or have a history of smoking.    Shots:     Get a flu shot each year.     Get a tetanus shot every 10 years.     Talk to your doctor about your pneumonia vaccines. There are now two you should receive - Pneumovax (PPSV 23) and Prevnar (PCV 13).     Talk to your pharmacist about a shingles vaccine.     Talk to your doctor about the hepatitis B vaccine.  Nutrition:     Eat at least 5 servings of fruits and vegetables each day.     Eat whole-grain bread, whole-wheat pasta and brown rice instead of white grains and rice.     Get adequate Calcium and Vitamin D.   Lifestyle    Exercise for at least 150 minutes a week (30 minutes a day, 5 days a week). This will help you control your weight and prevent disease.     Limit alcohol to one drink per day.     No smoking.     Wear sunscreen to prevent skin cancer.    See your dentist every six months for an exam and cleaning.    See your eye doctor every 1 to 2 years to screen for conditions such as glaucoma, macular degeneration, cataracts, etc.    Personalized Prevention Plan  You are due for the preventive services outlined below.  Your care team is available to assist you  in scheduling these services.  If you have already completed any of these items, please share that information with your care team to update in your medical record.  Health Maintenance Due   Topic Date Due     Hepatitis C Screening  07[birth year]1970     Zoster (Shingles) Vaccine (1 of 2) 07/31/2002     Discuss Advance Directive Planning  07/31/2007     Pneumococcal Vaccine (1 of 2 - PCV13) 07/31/2017     AORTIC ANEURYSM SCREENING (SYSTEM ASSIGNED)  07/31/2017     Cholesterol Lab - every 5 years  05/01/2018     FALL RISK ASSESSMENT  12/01/2018     Colon Cancer Screening - every 10 years.  02/04/2019     Diptheria Tetanus Pertussis (DTAP/TDAP/TD) Vaccine (2 - Td) 02/04/2019     Preventive Health Recommendations:     See your health care provider every year to    Review health changes.     Discuss preventive care.      Review your medicines if your doctor has prescribed any.    Talk with your health care provider about whether you should have a test to screen for prostate cancer (PSA).    Every 3 years, have a diabetes test (fasting glucose). If you are at risk for diabetes, you should have this test more often.    Every 5 years, have a cholesterol test. Have this test more often if you are at risk for high cholesterol or heart disease.     Every 10 years, have a colonoscopy. Or, have a yearly FIT test (stool test). These exams will check for colon cancer.    Talk to with your health care provider about screening for Abdominal Aortic Aneurysm if you have a family history of AAA or have a history of smoking.  Shots:     Get a flu shot each year.     Get a tetanus shot every 10 years.     Talk to your doctor about your pneumonia vaccines. There are now two you should receive - Pneumovax (PPSV 23) and Prevnar (PCV 13).    Talk to your pharmacist about a shingles vaccine.     Talk to your doctor about the hepatitis B vaccine.  Nutrition:     Eat at least 5 servings of fruits and vegetables each day.     Eat whole-grain bread,  whole-wheat pasta and brown rice instead of white grains and rice.     Get adequate Calcium and Vitamin D.   Lifestyle    Exercise for at least 150 minutes a week (30 minutes a day, 5 days a week). This will help you control your weight and prevent disease.     Limit alcohol to one drink per day.     No smoking.     Wear sunscreen to prevent skin cancer.     See your dentist every six months for an exam and cleaning.     See your eye doctor every 1 to 2 years to screen for conditions such as glaucoma, macular degeneration and cataracts.    Personalized Prevention Plan  You are due for the preventive services outlined below.  Your care team is available to assist you in scheduling these services.  If you have already completed any of these items, please share that information with your care team to update in your medical record.    Health Maintenance Due   Topic Date Due     Hepatitis C Screening  07/31/1970     Zoster (Shingles) Vaccine (1 of 2) 07/31/2002     Discuss Advance Directive Planning  07/31/2007     Pneumococcal Vaccine (1 of 2 - PCV13) 07/31/2017     AORTIC ANEURYSM SCREENING (SYSTEM ASSIGNED)  07/31/2017     Cholesterol Lab - every 5 years  05/01/2018     FALL RISK ASSESSMENT  12/01/2018     Colon Cancer Screening - every 10 years.  02/04/2019     Diptheria Tetanus Pertussis (DTAP/TDAP/TD) Vaccine (2 - Td) 02/04/2019

## 2019-02-08 ENCOUNTER — TELEPHONE (OUTPATIENT)
Dept: FAMILY MEDICINE | Facility: CLINIC | Age: 69
End: 2019-02-08

## 2019-02-08 NOTE — RESULT ENCOUNTER NOTE
Urine test was negative for any blood.  Chemistry panel was normal your blood sugar was borderline at 105.  PSA was normal no change from 3 years ago.  Cholesterol was 190 which is okay.

## 2019-02-08 NOTE — TELEPHONE ENCOUNTER
----- Message from Gregory Horowitz MD sent at 2/8/2019 11:40 AM CST -----  Urine test was negative for any blood.  Chemistry panel was normal your blood sugar was borderline at 105.  PSA was normal no change from 3 years ago.  Cholesterol was 190 which is okay.

## 2019-10-08 NOTE — PATIENT INSTRUCTIONS
Before Your Surgery      Call your surgeon if there is any change in your health. This includes signs of a cold or flu (such as a sore throat, runny nose, cough, rash or fever).    Do not smoke, drink alcohol or take over the counter medicine (unless your surgeon or primary care doctor tells you to) for the 24 hours before and after surgery.    If you take prescribed drugs: Follow your doctor s orders about which medicines to take and which to stop until after surgery.    Eating and drinking prior to surgery: follow the instructions from your surgeon    Take a shower or bath the night before surgery. Use the soap your surgeon gave you to gently clean your skin. If you do not have soap from your surgeon, use your regular soap. Do not shave or scrub the surgery site.  Wear clean pajamas and have clean sheets on your bed.   Before Your Surgery    Call your surgeon if there is any change in your health. This includes signs of a cold or flu (such as a sore throat, runny nose, cough, rash or fever).  Do not smoke, drink alcohol or take over the counter medicine (unless your surgeon or primary care doctor tells you to) for the 24 hours before and after surgery.  If you take prescribed drugs: Follow your doctor s orders about which medicines to take and which to stop until after surgery.  Eating and drinking prior to surgery: follow the instructions from your surgeon  Take a shower or bath the night before surgery. Use the soap your surgeon gave you to gently clean your skin. If you do not have soap from your surgeon, use your regular soap. Do not shave or scrub the surgery site.  Wear clean pajamas and have clean sheets on your bed.    Patient has pain at site of previous lumbar laminectomy. Patient states that she has had epidurals 2 months ago without significant improvement. Patient has MRI imaging of lower back from April 2019. She would benefit from follow up with spine center to determine if local injection, epidural or other intervention could be helpful.  Continue home dose of gabapentin.  started lidocaine patch for pain control.  Avoid NSAIDS

## 2019-10-11 ENCOUNTER — OFFICE VISIT (OUTPATIENT)
Dept: FAMILY MEDICINE | Facility: CLINIC | Age: 69
End: 2019-10-11
Payer: COMMERCIAL

## 2019-10-11 VITALS
SYSTOLIC BLOOD PRESSURE: 124 MMHG | OXYGEN SATURATION: 97 % | HEART RATE: 72 BPM | WEIGHT: 223.2 LBS | DIASTOLIC BLOOD PRESSURE: 68 MMHG | TEMPERATURE: 97 F | RESPIRATION RATE: 18 BRPM | BODY MASS INDEX: 30.27 KG/M2

## 2019-10-11 DIAGNOSIS — H25.9 SENILE CATARACT OF RIGHT EYE, UNSPECIFIED AGE-RELATED CATARACT TYPE: ICD-10-CM

## 2019-10-11 DIAGNOSIS — Z01.818 PREOP GENERAL PHYSICAL EXAM: Primary | ICD-10-CM

## 2019-10-11 PROCEDURE — 99214 OFFICE O/P EST MOD 30 MIN: CPT | Performed by: FAMILY MEDICINE

## 2019-10-11 ASSESSMENT — PAIN SCALES - GENERAL: PAINLEVEL: NO PAIN (0)

## 2019-10-11 NOTE — PROGRESS NOTES
[address] [id number] [phone]  Dept: [phone]    PRE-OP EVALUATION:  Today's date: 10/11/2019    Miguelangel Graham (: 1950) presents for pre-operative evaluation assessment as requested by Dr. Marks.  He requires evaluation and anesthesia risk assessment prior to undergoing surgery/procedure for treatment of cataracts .    Fax number for surgical facility:   Primary Physician: No Ref-Primary, Physician  Type of Anesthesia Anticipated: Local with MAC and Topical    Patient has a Health Care Directive or Living Will:  NO    Preop Questions 10/11/2019   Who is doing your surgery? [facility]   What are you having done? eye surgery cataracts   Date of Surgery/Procedure: 10/17/19, 10/31/19   Facility or Hospital where procedure/surgery will be performed: [facility]   1.  Do you have a history of Heart attack, stroke, stent, coronary bypass surgery, or other heart surgery? No   2.  Do you ever have any pain or discomfort in your chest? No   3.  Do you have a history of  Heart Failure? No   4.   Are you troubled by shortness of breath when:  walking on a level surface, or up a slight hill, or at night? No   5.  Do you currently have a cold, bronchitis or other respiratory infection? No   6.  Do you have a cough, shortness of breath, or wheezing? No   7.  Do you sometimes get pains in the calves of your legs when you walk? No   8. Do you or anyone in your family have previous history of blood clots? No   9.  Do you or does anyone in your family have a serious bleeding problem such as prolonged bleeding following surgeries or cuts? No   10. Have you ever had problems with anemia or been told to take iron pills? No   11. Have you had any abnormal blood loss such as black, tarry or bloody stools? No   12. Have you ever had a blood transfusion? No   13. Have you or any of your relatives ever had problems with anesthesia? No   14. Do you have sleep apnea, excessive  snoring or daytime drowsiness? No   15. Do you have any prosthetic heart valves? No   16. Do you have prosthetic joints? No         HPI:     HPI related to upcoming procedure: age related catarat with have right lens removed with replacement lens      See problem list for active medical problems.  Problems all longstanding and stable, except as noted/documented.  See ROS for pertinent symptoms related to these conditions.      MEDICAL HISTORY:     Patient Active Problem List    Diagnosis Date Noted     Kidney stone 02/07/2019     Priority: Medium     S/P rotator cuff repair 03/15/2018     Priority: Medium     Primary osteoarthritis of right hip 01/22/2018     Priority: Medium     AC joint arthropathy 10/25/2017     Priority: Medium     Biceps tendonitis, right 10/25/2017     Priority: Medium     Complete tear of right rotator cuff 10/25/2017     Priority: Medium     CARDIOVASCULAR SCREENING; LDL GOAL LESS THAN 160 10/31/2010     Priority: Medium      Past Medical History:   Diagnosis Date     PONV (postoperative nausea and vomiting) 12/12/2017    shoulder surgery     Past Surgical History:   Procedure Laterality Date     ARTHROSCOPY SHOULDER BICEPS TENODESIS REPAIR Right 12/12/2017    Procedure: ARTHROSCOPY SHOULDER BICEPS TENODESIS REPAIR;;  Surgeon: Jose Jones DO;  [facility]     ARTHROSCOPY SHOULDER DECOMPRESSION Right 12/12/2017    Procedure: ARTHROSCOPY SHOULDER DECOMPRESSION;;  Surgeon: Jose Jones DO;  [facility]     ARTHROSCOPY SHOULDER DISTAL CLAVICLE REPAIR Right 12/12/2017    Procedure: ARTHROSCOPY SHOULDER DISTAL CLAVICLE RESECTION;;  Surgeon: Jose Jones DO;  [facility]     ARTHROSCOPY SHOULDER, OPEN ROTATOR CUFF REPAIR, COMBINED Right 12/12/2017    Procedure: COMBINED ARTHROSCOPY SHOULDER, OPEN ROTATOR CUFF REPAIR;  right shoulder arthroscopy with open mini rotator cuff repair, biceps tenodesis, distal clavicle and subacromial decompression with  partial acromioplasty and removal of foreing body;  Surgeon: Jose Jones DO;  Location[facility]     HC KNEE SCOPE, DIAGNOSTIC      Arthroscopy, Knee     HC REPAIR ROTATOR CUFF,CHRONIC       REMOVE FOREIGN BODY UPPER EXTREMITY Right 2017    Procedure: REMOVE FOREIGN BODY UPPER EXTREMITY;;  Surgeon: Jose Jones DO;  Location[facility]     No current outpatient medications on file.     OTC products: None, except as noted above    Allergies   Allergen Reactions     No Known Drug Allergies       Latex Allergy: NO    Social History     Tobacco Use     Smoking status: Former Smoker     Last attempt to quit: 1985     Years since quittin.7     Smokeless tobacco: Never Used   Substance Use Topics     Alcohol use: Yes     Alcohol/week: 14.0 standard drinks     Types: 7 Cans of beer, 7 Shots of liquor per week     Comment: 2 daily     History   Drug Use No       REVIEW OF SYSTEMS:   CONSTITUTIONAL: NEGATIVE for fever, chills, change in weight  EYES: diminished vision eyes due to cataracts  ENT/MOUTH: NEGATIVE for ear, mouth and throat problems  RESP: NEGATIVE for significant cough or SOB  CV: NEGATIVE for chest pain, palpitations or peripheral edema  GI: NEGATIVE for nausea, abdominal pain, heartburn, or change in bowel habits  MUSCULOSKELETAL: NEGATIVE for significant arthralgias or myalgia  ROS otherwise negative    EXAM:   /68   Pulse 72   Temp 97  F (36.1  C) (Temporal)   Resp 18   Wt 101.2 kg (223 lb 3.2 oz)   SpO2 97%   BMI 30.27 kg/m    GENERAL APPEARANCE: healthy, alert and no distress  HENT: ear canals and TM's normal and nose and mouth without ulcers or lesions  RESP: lungs clear to auscultation - no rales, rhonchi or wheezes  CV: regular rate and rhythm, normal S1 S2, no S3 or S4 and no murmur, click or rub   ABDOMEN: soft, nontender, no HSM or masses and bowel sounds normal  NEURO: Normal strength and tone, sensory exam grossly normal, mentation intact and speech  normal    DIAGNOSTICS:   No labs or EKG required for low risk surgery (cataract, skin procedure, breast biopsy, etc)    Recent Labs   Lab Test 02/07/19  1017 05/01/13  0803    140   POTASSIUM 4.2 4.1   CR 0.95 0.86        IMPRESSION:   Reason for surgery/procedure: cataracts which need removal and lens implant, right side first    Diagnosis/reason for consult:     ICD-10-CM    1. Preop general physical exam Z01.818    2. Senile cataract of right eye, unspecified age-related cataract type H25.9        The proposed surgical procedure is considered LOW risk.    REVISED CARDIAC RISK INDEX  The patient has the following serious cardiovascular risks for perioperative complications such as (MI, PE, VFib and 3  AV Block):  No serious cardiac risks  INTERPRETATION: 0 risks: Class I (very low risk - 0.4% complication rate)    The patient has the following additional risks for perioperative complications:  No identified additional risks      ICD-10-CM    1. Preop general physical exam Z01.818    2. Senile cataract of right eye, unspecified age-related cataract type H25.9        RECOMMENDATIONS:     No meds to stop    APPROVAL GIVEN to proceed with proposed procedure, without further diagnostic evaluation       Signed Electronically by: Erick Mccall MD    Copy of this evaluation report is provided to requesting physician.    [facility] Preop Guidelines    Revised Cardiac Risk Index

## 2019-10-15 RX ORDER — ERYTHROMYCIN 5 MG/G
OINTMENT OPHTHALMIC
Refills: 0 | COMMUNITY
Start: 2019-03-15 | End: 2020-01-16

## 2019-10-15 RX ORDER — KETOROLAC TROMETHAMINE 5 MG/ML
SOLUTION OPHTHALMIC
Refills: 0 | COMMUNITY
Start: 2019-10-07 | End: 2020-01-16

## 2019-10-15 RX ORDER — MOXIFLOXACIN 5 MG/ML
SOLUTION/ DROPS OPHTHALMIC
Refills: 0 | COMMUNITY
Start: 2019-10-07 | End: 2020-01-16

## 2019-10-15 RX ORDER — PREDNISOLONE ACETATE 10 MG/ML
SUSPENSION/ DROPS OPHTHALMIC
Refills: 0 | COMMUNITY
Start: 2019-10-07 | End: 2020-01-16

## 2019-10-17 ENCOUNTER — ANESTHESIA EVENT (OUTPATIENT)
Dept: SURGERY | Facility: CLINIC | Age: 69
End: 2019-10-17
Payer: MEDICARE

## 2019-10-17 ENCOUNTER — ANESTHESIA (OUTPATIENT)
Dept: SURGERY | Facility: CLINIC | Age: 69
End: 2019-10-17
Payer: MEDICARE

## 2019-10-17 ENCOUNTER — HOSPITAL ENCOUNTER (OUTPATIENT)
Facility: CLINIC | Age: 69
Discharge: HOME | End: 2019-10-17
Attending: OPHTHALMOLOGY | Admitting: OPHTHALMOLOGY
Payer: MEDICARE

## 2019-10-17 VITALS
DIASTOLIC BLOOD PRESSURE: 86 MMHG | OXYGEN SATURATION: 96 % | SYSTOLIC BLOOD PRESSURE: 144 MMHG | RESPIRATION RATE: 16 BRPM | HEART RATE: 52 BPM | TEMPERATURE: 97.7 F

## 2019-10-17 PROCEDURE — 25000128 H RX IP 250 OP 636: Performed by: NURSE ANESTHETIST, CERTIFIED REGISTERED

## 2019-10-17 PROCEDURE — 25000128 H RX IP 250 OP 636: Performed by: OPHTHALMOLOGY

## 2019-10-17 PROCEDURE — 25000125 ZZHC RX 250: Performed by: NURSE ANESTHETIST, CERTIFIED REGISTERED

## 2019-10-17 PROCEDURE — 71000022 ZZH RECOVERY CATRACT PACKAGE: Performed by: OPHTHALMOLOGY

## 2019-10-17 PROCEDURE — 36000025 ZZH CATARACT SURGICAL PACKAGE: Performed by: OPHTHALMOLOGY

## 2019-10-17 PROCEDURE — V2632 POST CHMBR INTRAOCULAR LENS: HCPCS | Performed by: OPHTHALMOLOGY

## 2019-10-17 PROCEDURE — 25000125 ZZHC RX 250: Performed by: OPHTHALMOLOGY

## 2019-10-17 PROCEDURE — 37000012 ZZH ANESTHESIA CATARACT PACKAGE: Performed by: OPHTHALMOLOGY

## 2019-10-17 DEVICE — EYE IMP IOL AMO PCL TECNIS ZCB00 19.0: Type: IMPLANTABLE DEVICE | Site: EYE | Status: FUNCTIONAL

## 2019-10-17 RX ORDER — PHENYLEPHRINE HYDROCHLORIDE 25 MG/ML
1 SOLUTION/ DROPS OPHTHALMIC
Status: COMPLETED | OUTPATIENT
Start: 2019-10-17 | End: 2019-10-17

## 2019-10-17 RX ORDER — TROPICAMIDE 10 MG/ML
1 SOLUTION/ DROPS OPHTHALMIC
Status: COMPLETED | OUTPATIENT
Start: 2019-10-17 | End: 2019-10-17

## 2019-10-17 RX ORDER — NALOXONE HYDROCHLORIDE 0.4 MG/ML
.1-.4 INJECTION, SOLUTION INTRAMUSCULAR; INTRAVENOUS; SUBCUTANEOUS
Status: DISCONTINUED | OUTPATIENT
Start: 2019-10-17 | End: 2019-10-17 | Stop reason: HOSPADM

## 2019-10-17 RX ORDER — LIDOCAINE 40 MG/G
CREAM TOPICAL
Status: DISCONTINUED | OUTPATIENT
Start: 2019-10-17 | End: 2019-10-17 | Stop reason: HOSPADM

## 2019-10-17 RX ORDER — SODIUM CHLORIDE, SODIUM LACTATE, POTASSIUM CHLORIDE, CALCIUM CHLORIDE 600; 310; 30; 20 MG/100ML; MG/100ML; MG/100ML; MG/100ML
INJECTION, SOLUTION INTRAVENOUS CONTINUOUS
Status: DISCONTINUED | OUTPATIENT
Start: 2019-10-17 | End: 2019-10-17 | Stop reason: HOSPADM

## 2019-10-17 RX ORDER — PROPARACAINE HYDROCHLORIDE 5 MG/ML
1 SOLUTION/ DROPS OPHTHALMIC ONCE
Status: DISCONTINUED | OUTPATIENT
Start: 2019-10-17 | End: 2019-10-17 | Stop reason: HOSPADM

## 2019-10-17 RX ORDER — CYCLOPENTOLATE HYDROCHLORIDE 10 MG/ML
1 SOLUTION/ DROPS OPHTHALMIC
Status: COMPLETED | OUTPATIENT
Start: 2019-10-17 | End: 2019-10-17

## 2019-10-17 RX ORDER — PROPARACAINE HYDROCHLORIDE 5 MG/ML
1 SOLUTION/ DROPS OPHTHALMIC ONCE
Status: COMPLETED | OUTPATIENT
Start: 2019-10-17 | End: 2019-10-17

## 2019-10-17 RX ORDER — ONDANSETRON 2 MG/ML
4 INJECTION INTRAMUSCULAR; INTRAVENOUS EVERY 30 MIN PRN
Status: DISCONTINUED | OUTPATIENT
Start: 2019-10-17 | End: 2019-10-17 | Stop reason: HOSPADM

## 2019-10-17 RX ORDER — LIDOCAINE HYDROCHLORIDE 10 MG/ML
INJECTION, SOLUTION INFILTRATION; PERINEURAL PRN
Status: DISCONTINUED | OUTPATIENT
Start: 2019-10-17 | End: 2019-10-17 | Stop reason: HOSPADM

## 2019-10-17 RX ORDER — KETAMINE HYDROCHLORIDE 10 MG/ML
INJECTION INTRAMUSCULAR; INTRAVENOUS PRN
Status: DISCONTINUED | OUTPATIENT
Start: 2019-10-17 | End: 2019-10-17

## 2019-10-17 RX ORDER — ONDANSETRON 4 MG/1
4 TABLET, ORALLY DISINTEGRATING ORAL EVERY 30 MIN PRN
Status: DISCONTINUED | OUTPATIENT
Start: 2019-10-17 | End: 2019-10-17 | Stop reason: HOSPADM

## 2019-10-17 RX ADMIN — CYCLOPENTOLATE HYDROCHLORIDE 1 DROP: 10 SOLUTION/ DROPS OPHTHALMIC at 12:06

## 2019-10-17 RX ADMIN — TROPICAMIDE 1 DROP: 10 SOLUTION/ DROPS OPHTHALMIC at 12:05

## 2019-10-17 RX ADMIN — PHENYLEPHRINE HYDROCHLORIDE 1 DROP: 25 SOLUTION/ DROPS OPHTHALMIC at 11:58

## 2019-10-17 RX ADMIN — KETAMINE HYDROCHLORIDE 10 MG: 10 INJECTION, SOLUTION INTRAMUSCULAR; INTRAVENOUS at 12:44

## 2019-10-17 RX ADMIN — CYCLOPENTOLATE HYDROCHLORIDE 1 DROP: 10 SOLUTION/ DROPS OPHTHALMIC at 11:49

## 2019-10-17 RX ADMIN — KETAMINE HYDROCHLORIDE 10 MG: 10 INJECTION, SOLUTION INTRAMUSCULAR; INTRAVENOUS at 12:45

## 2019-10-17 RX ADMIN — PROPARACAINE HYDROCHLORIDE 1 DROP: 5 SOLUTION/ DROPS OPHTHALMIC at 11:49

## 2019-10-17 RX ADMIN — PHENYLEPHRINE HYDROCHLORIDE 1 DROP: 25 SOLUTION/ DROPS OPHTHALMIC at 12:06

## 2019-10-17 RX ADMIN — TROPICAMIDE 1 DROP: 10 SOLUTION/ DROPS OPHTHALMIC at 11:58

## 2019-10-17 RX ADMIN — CYCLOPENTOLATE HYDROCHLORIDE 1 DROP: 10 SOLUTION/ DROPS OPHTHALMIC at 11:58

## 2019-10-17 RX ADMIN — TROPICAMIDE 1 DROP: 10 SOLUTION/ DROPS OPHTHALMIC at 11:49

## 2019-10-17 RX ADMIN — MIDAZOLAM 2 MG: 1 INJECTION INTRAMUSCULAR; INTRAVENOUS at 12:36

## 2019-10-17 RX ADMIN — PHENYLEPHRINE HYDROCHLORIDE 1 DROP: 25 SOLUTION/ DROPS OPHTHALMIC at 11:49

## 2019-10-17 RX ADMIN — LIDOCAINE HYDROCHLORIDE 1 ML: 10 INJECTION, SOLUTION EPIDURAL; INFILTRATION; INTRACAUDAL; PERINEURAL at 11:58

## 2019-10-17 ASSESSMENT — LIFESTYLE VARIABLES: TOBACCO_USE: 0

## 2019-10-17 NOTE — ANESTHESIA POSTPROCEDURE EVALUATION
Patient: Miguelangel Graham    Procedure(s):  PHACOEMULSIFICATION, CATARACT Right WITH STANDARD IOL INSERTION    Diagnosis:Nuclear sclerotic cataract of right eye [H25.11]  Diagnosis Additional Information: No value filed.    Anesthesia Type:  MAC    Note:  Anesthesia Post Evaluation    Patient location during evaluation: Phase 2 and Bedside  Patient participation: Able to fully participate in evaluation  Level of consciousness: awake and alert  Pain management: satisfactory to patient  Airway patency: patent  Cardiovascular status: stable  Respiratory status: room air and spontaneous ventilation  Hydration status: stable  PONV: none     Anesthetic complications: None    Comments: Appear to tolerate MAC with IV sedation well without anesthesia related problems / complications noted.  Pain level satisfactory per patient. No N  /  V.  No complaints per patient.  Will follow as needed.        Last vitals:  Vitals:    10/17/19 1159 10/17/19 1304   BP: (!) 143/89    Pulse: 50    Resp: 16    Temp: 97.7  F (36.5  C)    SpO2: 99% 95%         Electronically Signed By: POONAM Hinson CRNA  October 17, 2019  1:14 PM

## 2019-10-17 NOTE — OP NOTE
PREOPERATIVE DIAGNOSIS: Visually significant cataract, Right eye   POSTOPERATIVE DIAGNOSIS: Same   PROCEDURES:   1. Cataract extraction with intraocular lens implant Right eye.  SURGEON: Jf Montiel M.D.  INDICATIONS: The patient Miguelangel Graham presented to the eye clinic with decreased vision secondary to cataract in the Right eye. The risks, including, but not limited to infection, loss of vision, loss of eye, need for more surgery, and bleeding, along with the benefits, alternatives, expectations, and the procedure itself were discussed at length with the patient who wished to proceed with surgery. All questions were answered to the patient's satisfaction. The stated procedure is still clinically indicated.   DESCRIPTION OF PROCEDURE:   Prior to the procedure, appropriate cardiac and respiratory monitors were applied to the patient.  In the pre-operative holding area, a drop of topical tetracaine followed by povidone iodine followed by lidocaine gel.  The patient was brought to the operating room where a surgical pause was carried out to identify with all members of the surgical team the correct surgical site.  With adequate anesthesia, the Right eye was prepped and draped in the usual sterile fashion. A lid speculum was placed, and the operating microscope was rotated into position. A paracentesis was created.  Through this limbal paracentesis, the anterior chamber was filled with preservative-free lidocaine followed by viscoelastic.   A temporal clear corneal incision was created at the limbus using a 2.5 mm blade. A capsulorrhexis was initiated using a cystotome and was completed in continuous and circular fashion using the capsulorrhexis forceps. The lens nucleus was hydrodissected using balanced salt solution.  The lens nucleus was rotated and removed using phacoemulsification in a stop and chop technique.  Residual cortical material was removed using irrigation-aspiration.  The capsular bag was  reinflated to its maximal extent with cohesive viscoelastic.  A 19.0 diopter ZCBOO was inserted into the capsular bag and noted to be well centered.  The lens power selected was reviewed using the intraocular lens power measurements that were obtained preoperatively to confirm that the correct lens was selected for the desired post-operative refractive state. The residual viscoelastic was aspirated. The anterior chamber was inflated with balanced salt solution and the wounds were hydrated and found to be self-sealing.  The eye was palpated and found to be of normal physiologic pressure. The lid speculum was removed. One drop of postoperative anti-inflammatory and antibiotic medication was instilled in the eye and a clear shield placed over the eye. The patient tolerated the procedure well and there were no intraoperative complications.      PLAN: The patient will be discharged to home and will follow up tomorrow in the eye clinic.  EBL:  None  Complications:  None  Implant Name Type Inv. Item Serial No.  Lot No. LRB No. Used   EYE IMP IOL LENORE PCL TECNIS [id number] 19.0 Lens/Eye Implant EYE IMP IOL LENORE PCL TECNIS ZCB00 19.0 [id number] [employer]  Right 1

## 2019-10-17 NOTE — ANESTHESIA CARE TRANSFER NOTE
Patient: Miguelangel Graham    Procedure(s):  PHACOEMULSIFICATION, CATARACT Right WITH STANDARD IOL INSERTION    Diagnosis: Nuclear sclerotic cataract of right eye [H25.11]  Diagnosis Additional Information: No value filed.    Anesthesia Type:   MAC     Note:  Airway :Room Air  Patient transferred to:Phase II  Handoff Report: Identifed the Patient, Identified the Reponsible Provider, Reviewed the pertinent medical history, Discussed the surgical course, Reviewed Intra-OP anesthesia mangement and issues during anesthesia, Set expectations for post-procedure period and Allowed opportunity for questions and acknowledgement of understanding      Vitals: (Last set prior to Anesthesia Care Transfer)    CRNA VITALS  10/17/2019 1230 - 10/17/2019 1312      10/17/2019             Resp Rate (observed):  (!) 4                Electronically Signed By: POONAM Hinson CRNA  October 17, 2019  1:12 PM

## 2019-10-17 NOTE — ANESTHESIA PREPROCEDURE EVALUATION
Anesthesia Pre-Procedure Evaluation    Patient: Miguelangel Graham   MRN: [id number] : 1950          Preoperative Diagnosis: Nuclear sclerotic cataract of right eye [H25.11]    Procedure(s):  PHACOEMULSIFICATION, CATARACT Right WITH STANDARD IOL INSERTION    Past Medical History:   Diagnosis Date     PONV (postoperative nausea and vomiting) 2017    shoulder surgery     Past Surgical History:   Procedure Laterality Date     ARTHROSCOPY SHOULDER BICEPS TENODESIS REPAIR Right 2017    Procedure: ARTHROSCOPY SHOULDER BICEPS TENODESIS REPAIR;;  Surgeon: Jose Jones DO;  [facility]     ARTHROSCOPY SHOULDER DECOMPRESSION Right 2017    Procedure: ARTHROSCOPY SHOULDER DECOMPRESSION;;  Surgeon: Jose Jones DO;  [facility]     ARTHROSCOPY SHOULDER DISTAL CLAVICLE REPAIR Right 2017    Procedure: ARTHROSCOPY SHOULDER DISTAL CLAVICLE RESECTION;;  Surgeon: Jose Jones DO;  [facility]     ARTHROSCOPY SHOULDER, OPEN ROTATOR CUFF REPAIR, COMBINED Right 2017    Procedure: COMBINED ARTHROSCOPY SHOULDER, OPEN ROTATOR CUFF REPAIR;  right shoulder arthroscopy with open mini rotator cuff repair, biceps tenodesis, distal clavicle and subacromial decompression with partial acromioplasty and removal of foreing body;  Surgeon: Jose Jones DO;  [facility]     HC KNEE SCOPE, DIAGNOSTIC      Arthroscopy, Knee     HC REPAIR ROTATOR CUFF,CHRONIC       REMOVE FOREIGN BODY UPPER EXTREMITY Right 2017    Procedure: REMOVE FOREIGN BODY UPPER EXTREMITY;;  Surgeon: Jose Jones DO;  [facility]       Anesthesia Evaluation     . Pt has had prior anesthetic. Type: General    No history of anesthetic complications          ROS/MED HX    ENT/Pulmonary:  - neg pulmonary ROS    (-) tobacco use   Neurologic:  - neg neurologic ROS     Cardiovascular:  - neg cardiovascular ROS   (+) ----. : . . . :. . Previous cardiac testing  date:results:date: results:ECG reviewed date:12/1/17 results:sinus bradycardia, normal axis, normal intervals, no acute ST/T changes c/w ischemia, no LVH by voltage criteria date: results:          METS/Exercise Tolerance:     Hematologic:  - neg hematologic  ROS       Musculoskeletal:  - neg musculoskeletal ROS       GI/Hepatic:  - neg GI/hepatic ROS       Renal/Genitourinary:  - ROS Renal section negative   (+) Nephrolithiasis ,       Endo:  - neg endo ROS       Psychiatric:  - neg psychiatric ROS       Infectious Disease:  - neg infectious disease ROS       Malignancy:      - no malignancy   Other:    - neg other ROS                      Physical Exam  Normal systems: cardiovascular, pulmonary and dental    Airway   Mallampati: II  TM distance: >3 FB  Neck ROM: full    Dental     Cardiovascular   Rhythm and rate: regular and normal  (-) no murmur    Pulmonary    breath sounds clear to auscultation            Lab Results   Component Value Date    WBC 6.1 02/04/2009    HGB 16.2 02/04/2009    HCT 46.0 02/04/2009     02/04/2009     02/07/2019    POTASSIUM 4.2 02/07/2019    CHLORIDE 107 02/07/2019    CO2 26 02/07/2019    BUN 11 02/07/2019    CR 0.95 02/07/2019     (H) 02/07/2019    PEPPER 8.7 02/07/2019    MAG 1.9 01/09/2005    ALBUMIN 3.7 02/07/2019    PROTTOTAL 8.0 02/07/2019    ALT 29 02/07/2019    AST 19 02/07/2019    ALKPHOS 49 02/07/2019    BILITOTAL 0.5 02/07/2019    TSH 3.80 02/04/2009       Preop Vitals  BP Readings from Last 3 Encounters:   10/11/19 124/68   02/07/19 126/72   01/22/19 130/80    Pulse Readings from Last 3 Encounters:   10/11/19 72   02/07/19 89   01/22/19 100      Resp Readings from Last 3 Encounters:   10/11/19 18   02/07/19 14   01/22/19 22    SpO2 Readings from Last 3 Encounters:   10/11/19 97%   02/07/19 99%   01/22/19 95%      Temp Readings from Last 1 Encounters:   10/11/19 97  F (36.1  C) (Temporal)    Ht Readings from Last 1 Encounters:   01/22/19 1.829 m (6')      Wt  Readings from Last 1 Encounters:   10/11/19 101.2 kg (223 lb 3.2 oz)    Estimated body mass index is 30.27 kg/m  as calculated from the following:    Height as of 1/22/19: 1.829 m (6').    Weight as of 10/11/19: 101.2 kg (223 lb 3.2 oz).       Anesthesia Plan      History & Physical Review  History and physical reviewed and following examination; no interval change.    ASA Status:  2 .    NPO Status:  > 6 hours    Plan for MAC with Intravenous induction. Maintenance will be TIVA.           Postoperative Care  Postoperative pain management:  Multi-modal analgesia.      Consents  Anesthetic plan, risks, benefits and alternatives discussed with:  Patient.  Use of blood products discussed: No .   .                 POONAM Hinson CRNA

## 2019-10-17 NOTE — DISCHARGE INSTRUCTIONS
POST CATARACT SURGERY EYE INSTRUCTIONS  DR. OSUNA           Eye Medications    VIGAMOX/OFLOXACIN (Tan Cap)    KETOROLAC (Agee Cap)    PREDNISOLONE (Pink or White Cap)    If you opted for the individual bottles of eye medications follow these instructions.    *Instill one drop from each bottle into the operative eye 3 times a day until each  bottle is empty.    *Wait 5 minutes between each drop.    If you opted for the one bottle containing all 3 eye medications, follow these instructions.    *Instill one drop into the operative eye 3 times a day until the bottle is empty.       - If your are currently being treated for glaucoma please continue your    medication as normally prescribed.     - Wear eye shield while sleeping for 3 days     - Refrain from heavy lifting, bending, straining, or strenuous activity for 1      week.     - Do not rub or push on the operative eye for 1 week (you may wipe the    eye lid(s) gently with a wet wash cloth to remove matter from                         eyelashes).     - You may bathe or shower, but do not get the eye wet for 1 month      (swimming, hot tubs or other water activities).     - Minor itching, scratching sensation, burning sensation, etc. is normal.     Report any severe eye pain or loss of vision.     - Please call our office at [phone] if you have questions or     concerns.

## 2019-10-31 ENCOUNTER — ANESTHESIA EVENT (OUTPATIENT)
Dept: SURGERY | Facility: CLINIC | Age: 69
End: 2019-10-31
Payer: MEDICARE

## 2019-10-31 ENCOUNTER — ANESTHESIA (OUTPATIENT)
Dept: SURGERY | Facility: CLINIC | Age: 69
End: 2019-10-31
Payer: MEDICARE

## 2019-10-31 ENCOUNTER — HOSPITAL ENCOUNTER (OUTPATIENT)
Facility: CLINIC | Age: 69
Discharge: HOME | End: 2019-10-31
Attending: OPHTHALMOLOGY | Admitting: OPHTHALMOLOGY
Payer: MEDICARE

## 2019-10-31 VITALS
SYSTOLIC BLOOD PRESSURE: 130 MMHG | TEMPERATURE: 98 F | DIASTOLIC BLOOD PRESSURE: 78 MMHG | RESPIRATION RATE: 16 BRPM | OXYGEN SATURATION: 99 % | HEART RATE: 51 BPM

## 2019-10-31 PROCEDURE — 25000125 ZZHC RX 250: Performed by: NURSE ANESTHETIST, CERTIFIED REGISTERED

## 2019-10-31 PROCEDURE — 71000022 ZZH RECOVERY CATRACT PACKAGE: Performed by: OPHTHALMOLOGY

## 2019-10-31 PROCEDURE — 36000025 ZZH CATARACT SURGICAL PACKAGE: Performed by: OPHTHALMOLOGY

## 2019-10-31 PROCEDURE — V2632 POST CHMBR INTRAOCULAR LENS: HCPCS | Performed by: OPHTHALMOLOGY

## 2019-10-31 PROCEDURE — 25000128 H RX IP 250 OP 636: Performed by: OPHTHALMOLOGY

## 2019-10-31 PROCEDURE — 37000012 ZZH ANESTHESIA CATARACT PACKAGE: Performed by: OPHTHALMOLOGY

## 2019-10-31 PROCEDURE — 25000125 ZZHC RX 250: Performed by: OPHTHALMOLOGY

## 2019-10-31 PROCEDURE — 25000128 H RX IP 250 OP 636: Performed by: NURSE ANESTHETIST, CERTIFIED REGISTERED

## 2019-10-31 DEVICE — EYE IMP IOL AMO PCL TECNIS ZCB00 19.5: Type: IMPLANTABLE DEVICE | Site: EYE | Status: FUNCTIONAL

## 2019-10-31 RX ORDER — TROPICAMIDE 10 MG/ML
1 SOLUTION/ DROPS OPHTHALMIC
Status: COMPLETED | OUTPATIENT
Start: 2019-10-31 | End: 2019-10-31

## 2019-10-31 RX ORDER — PROPARACAINE HYDROCHLORIDE 5 MG/ML
1 SOLUTION/ DROPS OPHTHALMIC ONCE
Status: COMPLETED | OUTPATIENT
Start: 2019-10-31 | End: 2019-10-31

## 2019-10-31 RX ORDER — CYCLOPENTOLATE HYDROCHLORIDE 10 MG/ML
1 SOLUTION/ DROPS OPHTHALMIC
Status: COMPLETED | OUTPATIENT
Start: 2019-10-31 | End: 2019-10-31

## 2019-10-31 RX ORDER — KETAMINE HYDROCHLORIDE 10 MG/ML
INJECTION INTRAMUSCULAR; INTRAVENOUS PRN
Status: DISCONTINUED | OUTPATIENT
Start: 2019-10-31 | End: 2019-10-31

## 2019-10-31 RX ORDER — PHENYLEPHRINE HYDROCHLORIDE 25 MG/ML
1 SOLUTION/ DROPS OPHTHALMIC
Status: COMPLETED | OUTPATIENT
Start: 2019-10-31 | End: 2019-10-31

## 2019-10-31 RX ORDER — LIDOCAINE 40 MG/G
CREAM TOPICAL
Status: DISCONTINUED | OUTPATIENT
Start: 2019-10-31 | End: 2019-10-31 | Stop reason: HOSPADM

## 2019-10-31 RX ADMIN — MIDAZOLAM 2 MG: 1 INJECTION INTRAMUSCULAR; INTRAVENOUS at 11:00

## 2019-10-31 RX ADMIN — CYCLOPENTOLATE HYDROCHLORIDE 1 DROP: 10 SOLUTION/ DROPS OPHTHALMIC at 10:20

## 2019-10-31 RX ADMIN — TROPICAMIDE 1 DROP: 10 SOLUTION/ DROPS OPHTHALMIC at 10:20

## 2019-10-31 RX ADMIN — PROPARACAINE HYDROCHLORIDE 1 DROP: 5 SOLUTION/ DROPS OPHTHALMIC at 10:14

## 2019-10-31 RX ADMIN — CYCLOPENTOLATE HYDROCHLORIDE 1 DROP: 10 SOLUTION/ DROPS OPHTHALMIC at 10:27

## 2019-10-31 RX ADMIN — PHENYLEPHRINE HYDROCHLORIDE 1 DROP: 25 SOLUTION/ DROPS OPHTHALMIC at 10:14

## 2019-10-31 RX ADMIN — TROPICAMIDE 1 DROP: 10 SOLUTION/ DROPS OPHTHALMIC at 10:27

## 2019-10-31 RX ADMIN — LIDOCAINE HYDROCHLORIDE 1 ML: 10 INJECTION, SOLUTION EPIDURAL; INFILTRATION; INTRACAUDAL; PERINEURAL at 10:39

## 2019-10-31 RX ADMIN — PROPARACAINE HYDROCHLORIDE 1 DROP: 5 SOLUTION/ DROPS OPHTHALMIC at 10:27

## 2019-10-31 RX ADMIN — CYCLOPENTOLATE HYDROCHLORIDE 1 DROP: 10 SOLUTION/ DROPS OPHTHALMIC at 10:14

## 2019-10-31 RX ADMIN — TROPICAMIDE 1 DROP: 10 SOLUTION/ DROPS OPHTHALMIC at 10:15

## 2019-10-31 RX ADMIN — KETAMINE HYDROCHLORIDE 20 MG: 10 INJECTION, SOLUTION INTRAMUSCULAR; INTRAVENOUS at 11:02

## 2019-10-31 RX ADMIN — PHENYLEPHRINE HYDROCHLORIDE 1 DROP: 25 SOLUTION/ DROPS OPHTHALMIC at 10:27

## 2019-10-31 RX ADMIN — PHENYLEPHRINE HYDROCHLORIDE 1 DROP: 25 SOLUTION/ DROPS OPHTHALMIC at 10:20

## 2019-10-31 ASSESSMENT — LIFESTYLE VARIABLES: TOBACCO_USE: 1

## 2019-10-31 NOTE — ANESTHESIA PREPROCEDURE EVALUATION
Anesthesia Pre-Procedure Evaluation    Patient: Miguelangel Graham   MRN: [id number] : 1950          Preoperative Diagnosis: Nuclear sclerotic cataract of left eye [H25.12]    Procedure(s):  PHACOEMULSIFICATION, CATARACT Left eye WITH STANDARD IOL INSERTION    Past Medical History:   Diagnosis Date     PONV (postoperative nausea and vomiting) 2017    shoulder surgery     Past Surgical History:   Procedure Laterality Date     ARTHROSCOPY SHOULDER BICEPS TENODESIS REPAIR Right 2017    Procedure: ARTHROSCOPY SHOULDER BICEPS TENODESIS REPAIR;;  Surgeon: Jose Jones DO;  [facility]     ARTHROSCOPY SHOULDER DECOMPRESSION Right 2017    Procedure: ARTHROSCOPY SHOULDER DECOMPRESSION;;  Surgeon: Jose Jones DO;  [facility]     ARTHROSCOPY SHOULDER DISTAL CLAVICLE REPAIR Right 2017    Procedure: ARTHROSCOPY SHOULDER DISTAL CLAVICLE RESECTION;;  Surgeon: Jose Jones DO;  [facility]     ARTHROSCOPY SHOULDER, OPEN ROTATOR CUFF REPAIR, COMBINED Right 2017    Procedure: COMBINED ARTHROSCOPY SHOULDER, OPEN ROTATOR CUFF REPAIR;  right shoulder arthroscopy with open mini rotator cuff repair, biceps tenodesis, distal clavicle and subacromial decompression with partial acromioplasty and removal of foreing body;  Surgeon: Jose Jones DO;  [facility]     HC KNEE SCOPE, DIAGNOSTIC      Arthroscopy, Knee     HC REPAIR ROTATOR CUFF,CHRONIC       PHACOEMULSIFICATION WITH STANDARD INTRAOCULAR LENS IMPLANT Right 10/17/2019    Procedure: PHACOEMULSIFICATION, CATARACT Right WITH STANDARD IOL INSERTION;  Surgeon: Jf Montiel MD;  [facility]     REMOVE FOREIGN BODY UPPER EXTREMITY Right 2017    Procedure: REMOVE FOREIGN BODY UPPER EXTREMITY;;  Surgeon: Jose Jones DO;  [facility]       Anesthesia Evaluation     . Pt has had prior anesthetic. Type: General and MAC    History of anesthetic  complications   - PONV        ROS/MED HX    ENT/Pulmonary:  - neg pulmonary ROS   (+)tobacco use, Past use , . .    Neurologic:  - neg neurologic ROS     Cardiovascular:  - neg cardiovascular ROS   (+) ----. : . . . :. . Previous cardiac testing date:results:date: results:ECG reviewed date:12/1/17 results:sinus bradycardia, normal axis, normal intervals, no acute ST/T changes c/w ischemia, no LVH by voltage criteria date: results:          METS/Exercise Tolerance:  >4 METS   Hematologic:  - neg hematologic  ROS       Musculoskeletal:  - neg musculoskeletal ROS       GI/Hepatic:  - neg GI/hepatic ROS       Renal/Genitourinary:  - ROS Renal section negative   (+) Nephrolithiasis ,       Endo:  - neg endo ROS       Psychiatric:  - neg psychiatric ROS       Infectious Disease:  - neg infectious disease ROS       Malignancy:      - no malignancy   Other:    - neg other ROS                      Physical Exam  Normal systems: cardiovascular, pulmonary and dental    Airway   Mallampati: II  TM distance: >3 FB  Neck ROM: full    Dental     Cardiovascular   Rhythm and rate: regular and normal      Pulmonary    breath sounds clear to auscultation            Lab Results   Component Value Date    WBC 6.1 02/04/2009    HGB 16.2 02/04/2009    HCT 46.0 02/04/2009     02/04/2009     02/07/2019    POTASSIUM 4.2 02/07/2019    CHLORIDE 107 02/07/2019    CO2 26 02/07/2019    BUN 11 02/07/2019    CR 0.95 02/07/2019     (H) 02/07/2019    PEPPER 8.7 02/07/2019    MAG 1.9 01/09/2005    ALBUMIN 3.7 02/07/2019    PROTTOTAL 8.0 02/07/2019    ALT 29 02/07/2019    AST 19 02/07/2019    ALKPHOS 49 02/07/2019    BILITOTAL 0.5 02/07/2019    TSH 3.80 02/04/2009       Preop Vitals  BP Readings from Last 3 Encounters:   10/17/19 (!) 144/86   10/11/19 124/68   02/07/19 126/72    Pulse Readings from Last 3 Encounters:   10/17/19 52   10/11/19 72   02/07/19 89      Resp Readings from Last 3 Encounters:   10/17/19 16   10/11/19 18    02/07/19 14    SpO2 Readings from Last 3 Encounters:   10/17/19 96%   10/11/19 97%   02/07/19 99%      Temp Readings from Last 1 Encounters:   10/17/19 97.7  F (36.5  C) (Oral)    Ht Readings from Last 1 Encounters:   01/22/19 1.829 m (6')      Wt Readings from Last 1 Encounters:   10/11/19 101.2 kg (223 lb 3.2 oz)    Estimated body mass index is 30.27 kg/m  as calculated from the following:    Height as of 1/22/19: 1.829 m (6').    Weight as of 10/11/19: 101.2 kg (223 lb 3.2 oz).       Anesthesia Plan      History & Physical Review  History and physical reviewed and following examination; no interval change.    ASA Status:  2 .    NPO Status:  > 6 hours    Plan for MAC with Intravenous induction. Reason for MAC:  Procedure to face, neck, head or breast    All available and pertinent medical records and test results reviewed.    Patient evaluated and examined prior to procedure, questions, concerns addressed and answered.        Postoperative Care      Consents  Anesthetic plan, risks, benefits and alternatives discussed with:  Patient.  Use of blood products discussed: No .   .                 Sabina Larson, POONAM CRNA

## 2019-10-31 NOTE — DISCHARGE INSTRUCTIONS
POST CATARACT SURGERY EYE INSTRUCTIONS  DR. OSUNA           Eye Medications    VIGAMOX/OFLOXACIN (Tan Cap)    KETOROLAC (Agee Cap)    PREDNISOLONE (Pink or White Cap)    If you opted for the individual bottles of eye medications follow these instructions.    *Instill one drop from each bottle into the operative eye 3 times a day until each  bottle is empty.    *Wait 5 minutes between each drop.    If you opted for the one bottle containing all 3 eye medications, follow these instructions.    *Instill one drop into the operative eye 3 times a day until the bottle is empty.       - If your are currently being treated for glaucoma please continue your medication as normally prescribed.     - Wear eye shield while sleeping for 3 days     - Refrain from heavy lifting, bending, straining, or strenuous activity for 1 week.     - Do not rub or push on the operative eye for 1 week (you may wipe the eye lid(s) gently with a wet wash cloth to remove matter from                         eyelashes).     - You may bathe or shower, but do not get the eye wet for 1 month (swimming, hot tubs or other water activities).     - Minor itching, scratching sensation, burning sensation, etc. is normal. Report any severe eye pain or loss of vision.     - Please call our office at [phone] if you have questions or concerns.    [facility]   Adult Discharge Orders & Instructions - After Anesthesia    For 24 hours after surgery    1. Get plenty of rest.  A responsible adult must stay with you for at least 24 hours after you leave the hospital.   2. Do not drive or use heavy equipment.  If you have weakness or tingling, don't drive or use heavy equipment until this feeling goes away.  3. Do not drink alcohol.  4. Avoid strenuous or risky activities.  Ask for help when climbing stairs.   5. You may feel lightheaded.  If so, sit for a few minutes before standing.  Have someone help you get up.   6. You may have a  slight fever. Call the doctor if your fever is over 100 F (37.7 C) (taken under the tongue) or lasts longer than 24 hours.  7. You may have a dry mouth, a sore throat, muscle aches or trouble sleeping.  These should go away after 24 hours.  8. Do not make important or legal decisions.  We don t expect you to have any problems from the surgery or treatment you had today. Just in case, here s what to do if you have pain, upset stomach (nausea), bleeding or infection:  Pain:  Take medicines your doctor has prescribed or over-the-counter medicine they have suggested. Resting and using ice packs can help, too. For surgery on an arm or leg, raise it on a pillow to ease swelling. Call your doctor if these methods don t work.  Copyright [employer], Licensed under [employer]  Upset stomach (nausea):  Take anti-nausea medicine approved by your doctor. Drink clear liquids like apple juice, ginger ale, broth or 7-Up. Be sure to drink enough fluids. Rest can help, too. Move to normal foods when you re ready. Bleeding:  In the first 24 hours, you may see a little blood on your dressing, about the size of a quarter. You don t need to worry about this much blood, but if the blood spot keeps getting bigger:    Put pressure on the wound if you can, AND    Call your doctor.  Copyright [employer], Licensed under [employer]  Fever/Infection: Please call your doctor if you have any of these signs:    Redness    Swelling    Wound feels warm    Pain gets worse    Bad-smelling fluid leaks from wound    Fever or chills  Call your doctor for any of the followin.  It has been over 8 to 10 hours since surgery and you are still not able to urinate (pass water).    2.  Headache for over 24 hours.

## 2019-10-31 NOTE — ANESTHESIA POSTPROCEDURE EVALUATION
Patient: Miguelangel Graham    Procedure(s):  PHACOEMULSIFICATION, CATARACT Left eye WITH STANDARD IOL INSERTION    Diagnosis:Nuclear sclerotic cataract of left eye [H25.12]  Diagnosis Additional Information: No value filed.    Anesthesia Type:  MAC    Note:  Anesthesia Post Evaluation    Patient location during evaluation: Phase 2  Patient participation: Able to fully participate in evaluation  Level of consciousness: awake and alert  Pain management: adequate  Airway patency: patent  Cardiovascular status: acceptable and stable  Respiratory status: acceptable and room air  Hydration status: acceptable  PONV: none     Anesthetic complications: None    Comments:  Patient was happy with the anesthesia care received and no anesthesia related complications were noted.  I will follow up with the patient again if it is needed.        Last vitals:  Vitals:    10/31/19 1118 10/31/19 1130 10/31/19 1145   BP: (!) 150/101 (!) 143/85 130/78   Pulse: 65 50 51   Resp: 16 16 16   Temp:      SpO2: 99% 99% 99%         Electronically Signed By: POONAM Briceño CRNA  October 31, 2019  12:10 PM

## 2019-10-31 NOTE — ANESTHESIA CARE TRANSFER NOTE
Patient: Miguelangel Graham    Procedure(s):  PHACOEMULSIFICATION, CATARACT Left eye WITH STANDARD IOL INSERTION    Diagnosis: Nuclear sclerotic cataract of left eye [H25.12]  Diagnosis Additional Information: No value filed.    Anesthesia Type:   MAC     Note:  Airway :Room Air  Patient transferred to:Phase II  Handoff Report: Identifed the Patient, Identified the Reponsible Provider, Reviewed the pertinent medical history, Discussed the surgical course, Reviewed Intra-OP anesthesia mangement and issues during anesthesia, Set expectations for post-procedure period and Allowed opportunity for questions and acknowledgement of understanding      Vitals: (Last set prior to Anesthesia Care Transfer)    CRNA VITALS  10/31/2019 1047 - 10/31/2019 1147      10/31/2019             Pulse:  52    SpO2:  98 %    Resp Rate (observed):  (!) 5                Electronically Signed By: POONAM Briceño CRNA  October 31, 2019  12:10 PM

## 2019-10-31 NOTE — OP NOTE
PREOPERATIVE DIAGNOSIS: Visually significant cataract, Left eye   POSTOPERATIVE DIAGNOSIS: Same   PROCEDURES:   1. Cataract extraction with intraocular lens implant Left eye.  SURGEON: Jf Montiel M.D.  INDICATIONS: The patient Miguelangel Graham presented to the eye clinic with decreased vision secondary to cataract in the Left eye. The risks, including, but not limited to infection, loss of vision, loss of eye, need for more surgery, and bleeding, along with the benefits, alternatives, expectations, and the procedure itself were discussed at length with the patient who wished to proceed with surgery. All questions were answered to the patient's satisfaction. The stated procedure is still clinically indicated.   DESCRIPTION OF PROCEDURE:   Prior to the procedure, appropriate cardiac and respiratory monitors were applied to the patient.  In the pre-operative holding area, a drop of topical tetracaine followed by povidone iodine followed by lidocaine gel.  The patient was brought to the operating room where a surgical pause was carried out to identify with all members of the surgical team the correct surgical site.  With adequate anesthesia, the Left eye was prepped and draped in the usual sterile fashion. A lid speculum was placed, and the operating microscope was rotated into position. A paracentesis was created.  Through this limbal paracentesis, the anterior chamber was filled with preservative-free lidocaine and epinephrine followed by viscoelastic.   A temporal clear corneal incision was created at the limbus using a 2.5 mm blade. A capsulorrhexis was initiated using a cystotome and was completed in continuous and circular fashion using the capsulorrhexis forceps. The lens nucleus was hydrodissected using balanced salt solution.  The lens nucleus was rotated and removed using phacoemulsification in a stop and chop technique.  Residual cortical material was removed using irrigation-aspiration.  The capsular bag  was reinflated to its maximal extent with cohesive viscoelastic.  A 19.5 diopter ZCBOO was inserted into the capsular bag and noted to be well centered.  The lens power selected was reviewed using the intraocular lens power measurements that were obtained preoperatively to confirm that the correct lens was selected for the desired post-operative refractive state. The residual viscoelastic was aspirated. The anterior chamber was inflated with balanced salt solution and the wounds were hydrated and found to be self-sealing.  The eye was palpated and found to be of normal physiologic pressure. The lid speculum was removed. One drop of postoperative anti-inflammatory and antibiotic medication was instilled in the eye and a clear shield placed over the eye. The patient tolerated the procedure well and there were no intraoperative complications.      PLAN: The patient will be discharged to home and will follow up tomorrow in the eye clinic.  EBL:  None  Complications:  None  Implant Name Type Inv. Item Serial No.  Lot No. LRB No. Used   EYE IMP IOL LENORE PCL TECNIS ZCB00 19.5 Lens/Eye Implant EYE IMP IOL LENORE PCL TECNIS ZCB00 19.5 [id number] [employer]  Left 1

## 2020-01-16 ENCOUNTER — OFFICE VISIT (OUTPATIENT)
Dept: FAMILY MEDICINE | Facility: CLINIC | Age: 70
End: 2020-01-16
Payer: COMMERCIAL

## 2020-01-16 VITALS
BODY MASS INDEX: 30.45 KG/M2 | OXYGEN SATURATION: 96 % | RESPIRATION RATE: 16 BRPM | WEIGHT: 224.8 LBS | TEMPERATURE: 96.1 F | HEIGHT: 72 IN | DIASTOLIC BLOOD PRESSURE: 88 MMHG | HEART RATE: 78 BPM | SYSTOLIC BLOOD PRESSURE: 138 MMHG

## 2020-01-16 DIAGNOSIS — M24.811 INTERNAL DERANGEMENT OF RIGHT SHOULDER: ICD-10-CM

## 2020-01-16 DIAGNOSIS — Z01.818 PREOP GENERAL PHYSICAL EXAM: Primary | ICD-10-CM

## 2020-01-16 LAB
ALBUMIN UR-MCNC: NEGATIVE MG/DL
ANION GAP SERPL CALCULATED.3IONS-SCNC: 5 MMOL/L (ref 3–14)
APPEARANCE UR: CLEAR
BILIRUB UR QL STRIP: NEGATIVE
BUN SERPL-MCNC: 11 MG/DL (ref 7–30)
CALCIUM SERPL-MCNC: 9.4 MG/DL (ref 8.5–10.1)
CHLORIDE SERPL-SCNC: 106 MMOL/L (ref 94–109)
CO2 SERPL-SCNC: 28 MMOL/L (ref 20–32)
COLOR UR AUTO: ABNORMAL
CREAT SERPL-MCNC: 0.82 MG/DL (ref 0.66–1.25)
ERYTHROCYTE [DISTWIDTH] IN BLOOD BY AUTOMATED COUNT: 12.5 % (ref 10–15)
GFR SERPL CREATININE-BSD FRML MDRD: >90 ML/MIN/{1.73_M2}
GLUCOSE SERPL-MCNC: 110 MG/DL (ref 70–99)
GLUCOSE UR STRIP-MCNC: NEGATIVE MG/DL
HCT VFR BLD AUTO: 47.6 % (ref 40–53)
HGB BLD-MCNC: 16.7 G/DL (ref 13.3–17.7)
HGB UR QL STRIP: NEGATIVE
KETONES UR STRIP-MCNC: NEGATIVE MG/DL
LEUKOCYTE ESTERASE UR QL STRIP: NEGATIVE
MCH RBC QN AUTO: 33.2 PG (ref 26.5–33)
MCHC RBC AUTO-ENTMCNC: 35.1 G/DL (ref 31.5–36.5)
MCV RBC AUTO: 95 FL (ref 78–100)
NITRATE UR QL: NEGATIVE
PH UR STRIP: 5 PH (ref 5–7)
PLATELET # BLD AUTO: 178 10E9/L (ref 150–450)
POTASSIUM SERPL-SCNC: 4.2 MMOL/L (ref 3.4–5.3)
RBC # BLD AUTO: 5.03 10E12/L (ref 4.4–5.9)
SODIUM SERPL-SCNC: 139 MMOL/L (ref 133–144)
SOURCE: ABNORMAL
SP GR UR STRIP: 1 (ref 1–1.03)
UROBILINOGEN UR STRIP-MCNC: 0 MG/DL (ref 0–2)
WBC # BLD AUTO: 6.5 10E9/L (ref 4–11)

## 2020-01-16 PROCEDURE — 85027 COMPLETE CBC AUTOMATED: CPT | Performed by: FAMILY MEDICINE

## 2020-01-16 PROCEDURE — 80048 BASIC METABOLIC PNL TOTAL CA: CPT | Performed by: FAMILY MEDICINE

## 2020-01-16 PROCEDURE — 93000 ELECTROCARDIOGRAM COMPLETE: CPT | Performed by: FAMILY MEDICINE

## 2020-01-16 PROCEDURE — 99214 OFFICE O/P EST MOD 30 MIN: CPT | Performed by: FAMILY MEDICINE

## 2020-01-16 PROCEDURE — 81003 URINALYSIS AUTO W/O SCOPE: CPT | Performed by: FAMILY MEDICINE

## 2020-01-16 PROCEDURE — 36415 COLL VENOUS BLD VENIPUNCTURE: CPT | Performed by: FAMILY MEDICINE

## 2020-01-16 ASSESSMENT — MIFFLIN-ST. JEOR: SCORE: 1832.69

## 2020-01-16 ASSESSMENT — PAIN SCALES - GENERAL: PAINLEVEL: MODERATE PAIN (5)

## 2020-01-16 NOTE — PROGRESS NOTES
[address] [id number] [phone]  Dept: [phone]    PRE-OP EVALUATION:  Today's date: 2020    Miguelangel Graham (: 1950) presents for pre-operative evaluation assessment as requested by Dr. Skelton.  He requires evaluation and anesthesia risk assessment prior to undergoing surgery/procedure for treatment of Right shoulder pain .    Fax number for surgical facility: [phone]  Primary Physician: No Ref-Primary, Physician  Type of Anesthesia Anticipated: to be determined    Patient has a Health Care Directive or Living Will:  NO    Preop Questions 2020   Who is doing your surgery? dr skelton   What are you having done? shoulder   Date of Surgery/Procedure: 2020   [facility]   1.  Do you have a history of Heart attack, stroke, stent, coronary bypass surgery, or other heart surgery? No   2.  Do you ever have any pain or discomfort in your chest? No   3.  Do you have a history of  Heart Failure? No   4.   Are you troubled by shortness of breath when:  walking on a level surface, or up a slight hill, or at night? No   5.  Do you currently have a cold, bronchitis or other respiratory infection? No   6.  Do you have a cough, shortness of breath, or wheezing? No   7.  Do you sometimes get pains in the calves of your legs when you walk? No   8. Do you or anyone in your family have previous history of blood clots? No   9.  Do you or does anyone in your family have a serious bleeding problem such as prolonged bleeding following surgeries or cuts? No   10. Have you ever had problems with anemia or been told to take iron pills? No   11. Have you had any abnormal blood loss such as black, tarry or bloody stools? No   12. Have you ever had a blood transfusion? No   13. Have you or any of your relatives ever had problems with anesthesia? No   14. Do you have sleep apnea, excessive snoring or  daytime drowsiness? No   15. Do you have any prosthetic heart valves? No   16. Do you have prosthetic joints? No         HPI:     HPI related to upcoming procedure: In total shoulder replacement several years ago on the right but it has failed.          MEDICAL HISTORY:     Patient Active Problem List    Diagnosis Date Noted     Kidney stone 02/07/2019     Priority: Medium     S/P rotator cuff repair 03/15/2018     Priority: Medium     Primary osteoarthritis of right hip 01/22/2018     Priority: Medium     AC joint arthropathy 10/25/2017     Priority: Medium     Biceps tendonitis, right 10/25/2017     Priority: Medium     Complete tear of right rotator cuff 10/25/2017     Priority: Medium     CARDIOVASCULAR SCREENING; LDL GOAL LESS THAN 160 10/31/2010     Priority: Medium      Past Medical History:   Diagnosis Date     PONV (postoperative nausea and vomiting) 12/12/2017    shoulder surgery     Past Surgical History:   Procedure Laterality Date     ARTHROSCOPY SHOULDER BICEPS TENODESIS REPAIR Right 12/12/2017    Procedure: ARTHROSCOPY SHOULDER BICEPS TENODESIS REPAIR;;  Surgeon: Jose Jones DO;  [facility]     ARTHROSCOPY SHOULDER DECOMPRESSION Right 12/12/2017    Procedure: ARTHROSCOPY SHOULDER DECOMPRESSION;;  Surgeon: Jose Jones DO;  [facility]     ARTHROSCOPY SHOULDER DISTAL CLAVICLE REPAIR Right 12/12/2017    Procedure: ARTHROSCOPY SHOULDER DISTAL CLAVICLE RESECTION;;  Surgeon: Jose Jones DO;  [facility]     ARTHROSCOPY SHOULDER, OPEN ROTATOR CUFF REPAIR, COMBINED Right 12/12/2017    Procedure: COMBINED ARTHROSCOPY SHOULDER, OPEN ROTATOR CUFF REPAIR;  right shoulder arthroscopy with open mini rotator cuff repair, biceps tenodesis, distal clavicle and subacromial decompression with partial acromioplasty and removal of foreing body;  Surgeon: Jose Jones DO;  [facility]     HC KNEE SCOPE, DIAGNOSTIC      Arthroscopy, Knee     HC REPAIR  ROTATOR CUFF,CHRONIC       PHACOEMULSIFICATION WITH STANDARD INTRAOCULAR LENS IMPLANT Right 10/17/2019    Procedure: PHACOEMULSIFICATION, CATARACT Right WITH STANDARD IOL INSERTION;  Surgeon: Jf Montiel MD;  [facility]     PHACOEMULSIFICATION WITH STANDARD INTRAOCULAR LENS IMPLANT Left 10/31/2019    Procedure: PHACOEMULSIFICATION, CATARACT Left eye WITH STANDARD IOL INSERTION;  Surgeon: Jf Montiel MD;  [facility]     REMOVE FOREIGN BODY UPPER EXTREMITY Right 2017    Procedure: REMOVE FOREIGN BODY UPPER EXTREMITY;;  Surgeon: Jose Jones DO;  [facility]     No current outpatient medications on file.     OTC products: None, except as noted above    Allergies   Allergen Reactions     No Known Drug Allergies       Latex Allergy: NO    Social History     Tobacco Use     Smoking status: Former Smoker     Last attempt to quit: 1985     Years since quittin.0     Smokeless tobacco: Never Used   Substance Use Topics     Alcohol use: Yes     Alcohol/week: 14.0 standard drinks     Types: 7 Cans of beer, 7 Shots of liquor per week     Comment: 2 daily     History   Drug Use No       REVIEW OF SYSTEMS:   Constitutional, neuro, ENT, endocrine, pulmonary, cardiac, gastrointestinal, genitourinary, musculoskeletal, integument and psychiatric systems are negative, except as otherwise noted.    EXAM:   Pulse 78   Temp 96.1  F (35.6  C) (Temporal)   Resp 16   Ht 1.829 m (6')   Wt 102 kg (224 lb 12.8 oz)   SpO2 96%   BMI 30.49 kg/m      GENERAL APPEARANCE: healthy, alert and no distress     EYES: EOMI,  PERRL     HENT: ear canals and TM's normal and nose and mouth without ulcers or lesions     NECK: no adenopathy, no asymmetry, masses, or scars and thyroid normal to palpation     RESP: lungs clear to auscultation - no rales, rhonchi or wheezes     CV: regular rates and rhythm, normal S1 S2, no S3 or S4 and no murmur, click or rub     ABDOMEN:  soft, nontender, no  HSM or masses and bowel sounds normal     MS: extremities normal- no gross deformities noted, no evidence of inflammation in joints, FROM in all extremities.     SKIN: no suspicious lesions or rashes     NEURO: Normal strength and tone, sensory exam grossly normal, mentation intact and speech normal     PSYCH: mentation appears normal. and affect normal/bright     LYMPHATICS: No cervical adenopathy    DIAGNOSTICS:     EKG: sinus bradycardia, normal axis, normal intervals, no acute ST/T changes c/w ischemia, no LVH by voltage criteria, unchanged from previous tracings  Labs Resulted Today:   Results for orders placed or performed in visit on 01/16/20   CBC with platelets     Status: Abnormal   Result Value Ref [place]    WBC 6.5 4.0 - 11.0 10e9/L    RBC Count 5.03 4.4 - 5.9 10e12/L    Hemoglobin 16.7 13.3 - 17.7 g/dL    Hematocrit 47.6 40.0 - 53.0 %    MCV 95 78 - 100 fl    MCH 33.2 (H) 26.5 - 33.0 pg    MCHC 35.1 31.5 - 36.5 g/dL    RDW 12.5 10.0 - 15.0 %    Platelet Count 178 150 - 450 10e9/L   Basic metabolic panel  (Ca, Cl, CO2, Creat, Gluc, K, Na, BUN)     Status: Abnormal   Result Value Ref [place]    Sodium 139 133 - 144 mmol/L    Potassium 4.2 3.4 - 5.3 mmol/L    Chloride 106 94 - 109 mmol/L    Carbon Dioxide 28 20 - 32 mmol/L    Anion Gap 5 3 - 14 mmol/L    Glucose 110 (H) 70 - 99 mg/dL    Urea Nitrogen 11 7 - 30 mg/dL    Creatinine 0.82 0.66 - 1.25 mg/dL    GFR Estimate >90 >60 mL/min/[1.73_m2]    GFR Estimate If Black >90 >60 mL/min/[1.73_m2]    Calcium 9.4 8.5 - 10.1 mg/dL   *UA reflex to Microscopic and Culture ([place]; [facility]; [facility]; [facility]; [facility]; [facility]; [place]; [place])     Status: Abnormal   Result Value Ref [place]    Color Urine Straw     Appearance Urine Clear     Glucose Urine Negative NEG^Negative mg/dL    Bilirubin Urine Negative NEG^Negative    Ketones Urine Negative NEG^Negative mg/dL    Specific Gravity Urine 1.000 (L) 1.003 - 1.035    Blood Urine  Negative NEG^Negative    pH Urine 5.0 5.0 - 7.0 pH    Protein Albumin Urine Negative NEG^Negative mg/dL    Urobilinogen mg/dL 0.0 0.0 - 2.0 mg/dL    Nitrite Urine Negative NEG^Negative    Leukocyte Esterase Urine Negative NEG^Negative    Source Unspecified Urine        Recent Labs   Lab Test 02/07/19  1017 05/01/13  0803    140   POTASSIUM 4.2 4.1   CR 0.95 0.86        IMPRESSION:   Reason for surgery/procedure: Previous right shoulder arthroplasty failed with pain.    The proposed surgical procedure is considered INTERMEDIATE risk.    REVISED CARDIAC RISK INDEX  The patient has the following serious cardiovascular risks for perioperative complications such as (MI, PE, VFib and 3  AV Block):  No serious cardiac risks  INTERPRETATION: 0 risks: Class I (very low risk - 0.4% complication rate)    The patient has the following additional risks for perioperative complications:  No identified additional risks      ICD-10-CM    1. Preop general physical exam Z01.818        RECOMMENDATIONS:             APPROVAL GIVEN to proceed with proposed procedure, without further diagnostic evaluation       Signed Electronically by: Gregory Horowitz MD    Copy of this evaluation report is provided to requesting physician.    [facility] Preop Guidelines    Revised Cardiac Risk Index

## 2021-12-03 ENCOUNTER — TELEPHONE (OUTPATIENT)
Dept: FAMILY MEDICINE | Facility: CLINIC | Age: 71
End: 2021-12-03

## 2021-12-03 ENCOUNTER — OFFICE VISIT (OUTPATIENT)
Dept: FAMILY MEDICINE | Facility: CLINIC | Age: 71
End: 2021-12-03
Payer: COMMERCIAL

## 2021-12-03 VITALS
SYSTOLIC BLOOD PRESSURE: 128 MMHG | TEMPERATURE: 97.3 F | HEIGHT: 71 IN | DIASTOLIC BLOOD PRESSURE: 72 MMHG | HEART RATE: 86 BPM | WEIGHT: 225 LBS | OXYGEN SATURATION: 95 % | RESPIRATION RATE: 18 BRPM | BODY MASS INDEX: 31.5 KG/M2

## 2021-12-03 DIAGNOSIS — Z13.6 CARDIOVASCULAR SCREENING; LDL GOAL LESS THAN 160: ICD-10-CM

## 2021-12-03 DIAGNOSIS — R53.83 FATIGUE, UNSPECIFIED TYPE: ICD-10-CM

## 2021-12-03 DIAGNOSIS — Z00.01 ENCOUNTER FOR GENERAL ADULT MEDICAL EXAMINATION WITH ABNORMAL FINDINGS: Primary | ICD-10-CM

## 2021-12-03 DIAGNOSIS — Z12.5 SCREENING FOR PROSTATE CANCER: ICD-10-CM

## 2021-12-03 DIAGNOSIS — Z12.11 COLON CANCER SCREENING: ICD-10-CM

## 2021-12-03 LAB
ALBUMIN SERPL-MCNC: 3.1 G/DL (ref 3.4–5)
ALP SERPL-CCNC: 49 U/L (ref 40–150)
ALT SERPL W P-5'-P-CCNC: 22 U/L (ref 0–70)
ANION GAP SERPL CALCULATED.3IONS-SCNC: 6 MMOL/L (ref 3–14)
AST SERPL W P-5'-P-CCNC: 17 U/L (ref 0–45)
BASOPHILS # BLD AUTO: 0.1 10E3/UL (ref 0–0.2)
BASOPHILS NFR BLD AUTO: 1 %
BILIRUB SERPL-MCNC: 0.6 MG/DL (ref 0.2–1.3)
BUN SERPL-MCNC: 17 MG/DL (ref 7–30)
CALCIUM SERPL-MCNC: 8.5 MG/DL (ref 8.5–10.1)
CHLORIDE BLD-SCNC: 108 MMOL/L (ref 94–109)
CHOLEST SERPL-MCNC: 173 MG/DL
CO2 SERPL-SCNC: 24 MMOL/L (ref 20–32)
CREAT SERPL-MCNC: 0.81 MG/DL (ref 0.66–1.25)
EOSINOPHIL # BLD AUTO: 0.4 10E3/UL (ref 0–0.7)
EOSINOPHIL NFR BLD AUTO: 4 %
ERYTHROCYTE [DISTWIDTH] IN BLOOD BY AUTOMATED COUNT: 11.9 % (ref 10–15)
FASTING STATUS PATIENT QL REPORTED: YES
GFR SERPL CREATININE-BSD FRML MDRD: >90 ML/MIN/1.73M2
GLUCOSE BLD-MCNC: 113 MG/DL (ref 70–99)
HCT VFR BLD AUTO: 43.8 % (ref 40–53)
HDLC SERPL-MCNC: 50 MG/DL
HGB BLD-MCNC: 15.5 G/DL (ref 13.3–17.7)
IMM GRANULOCYTES # BLD: 0 10E3/UL
IMM GRANULOCYTES NFR BLD: 0 %
LDLC SERPL CALC-MCNC: 81 MG/DL
LYMPHOCYTES # BLD AUTO: 2.7 10E3/UL (ref 0.8–5.3)
LYMPHOCYTES NFR BLD AUTO: 24 %
MCH RBC QN AUTO: 33.4 PG (ref 26.5–33)
MCHC RBC AUTO-ENTMCNC: 35.4 G/DL (ref 31.5–36.5)
MCV RBC AUTO: 94 FL (ref 78–100)
MONOCYTES # BLD AUTO: 1 10E3/UL (ref 0–1.3)
MONOCYTES NFR BLD AUTO: 9 %
NEUTROPHILS # BLD AUTO: 7.1 10E3/UL (ref 1.6–8.3)
NEUTROPHILS NFR BLD AUTO: 62 %
NONHDLC SERPL-MCNC: 123 MG/DL
NRBC # BLD AUTO: 0 10E3/UL
NRBC BLD AUTO-RTO: 0 /100
PLATELET # BLD AUTO: 183 10E3/UL (ref 150–450)
POTASSIUM BLD-SCNC: 3.9 MMOL/L (ref 3.4–5.3)
PROT SERPL-MCNC: 7.2 G/DL (ref 6.8–8.8)
PSA SERPL-MCNC: 0.71 UG/L (ref 0–4)
RBC # BLD AUTO: 4.64 10E6/UL (ref 4.4–5.9)
SODIUM SERPL-SCNC: 138 MMOL/L (ref 133–144)
T4 FREE SERPL-MCNC: 0.84 NG/DL (ref 0.76–1.46)
TRIGL SERPL-MCNC: 212 MG/DL
TSH SERPL DL<=0.005 MIU/L-ACNC: 6.49 MU/L (ref 0.4–4)
WBC # BLD AUTO: 11.4 10E3/UL (ref 4–11)

## 2021-12-03 PROCEDURE — 99213 OFFICE O/P EST LOW 20 MIN: CPT | Mod: 25 | Performed by: FAMILY MEDICINE

## 2021-12-03 PROCEDURE — G0008 ADMIN INFLUENZA VIRUS VAC: HCPCS | Performed by: FAMILY MEDICINE

## 2021-12-03 PROCEDURE — 84439 ASSAY OF FREE THYROXINE: CPT | Performed by: FAMILY MEDICINE

## 2021-12-03 PROCEDURE — 36415 COLL VENOUS BLD VENIPUNCTURE: CPT | Performed by: FAMILY MEDICINE

## 2021-12-03 PROCEDURE — 80053 COMPREHEN METABOLIC PANEL: CPT | Performed by: FAMILY MEDICINE

## 2021-12-03 PROCEDURE — 84443 ASSAY THYROID STIM HORMONE: CPT | Performed by: FAMILY MEDICINE

## 2021-12-03 PROCEDURE — 99397 PER PM REEVAL EST PAT 65+ YR: CPT | Mod: 25 | Performed by: FAMILY MEDICINE

## 2021-12-03 PROCEDURE — 85025 COMPLETE CBC W/AUTO DIFF WBC: CPT | Performed by: FAMILY MEDICINE

## 2021-12-03 PROCEDURE — 80061 LIPID PANEL: CPT | Performed by: FAMILY MEDICINE

## 2021-12-03 PROCEDURE — G0103 PSA SCREENING: HCPCS | Performed by: FAMILY MEDICINE

## 2021-12-03 PROCEDURE — 90662 IIV NO PRSV INCREASED AG IM: CPT | Performed by: FAMILY MEDICINE

## 2021-12-03 ASSESSMENT — MIFFLIN-ST. JEOR: SCORE: 1807.72

## 2021-12-03 ASSESSMENT — ACTIVITIES OF DAILY LIVING (ADL): CURRENT_FUNCTION: NO ASSISTANCE NEEDED

## 2021-12-03 ASSESSMENT — PAIN SCALES - GENERAL: PAINLEVEL: NO PAIN (0)

## 2021-12-03 NOTE — PATIENT INSTRUCTIONS
Patient Education   Personalized Prevention Plan  You are due for the preventive services outlined below.  Your care team is available to assist you in scheduling these services.  If you have already completed any of these items, please share that information with your care team to update in your medical record.  Health Maintenance Due   Topic Date Due     ANNUAL REVIEW OF HM ORDERS  Never done     Discuss Advance Care Planning  Never done     Colorectal Cancer Screening  Never done     Hepatitis C Screening  Never done     Zoster (Shingles) Vaccine (1 of 2) Never done     Pneumococcal Vaccine (1 of 1 - PPSV23) Never done     AORTIC ANEURYSM SCREENING (SYSTEM ASSIGNED)  Never done     Diptheria Tetanus Pertussis (DTAP/TDAP/TD) Vaccine (2 - Td or Tdap) 02/04/2019     PHQ-2  01/01/2021     FALL RISK ASSESSMENT  01/16/2021

## 2021-12-03 NOTE — TELEPHONE ENCOUNTER
----- Message from Gregory Horowitz MD sent at 12/3/2021 12:27 PM CST -----  Let him know that his labs came back with his thyroid test slightly off pushing towards possibility of hypothyroidism.  But before we do anything we just need to repeat this in 6 to 8 weeks.  The orders are in.  He should schedule a lab appointment.  His blood sugar was just borderline high at 113.  His PSA was fine.  Cholesterol is 173 very good.

## 2021-12-03 NOTE — PROGRESS NOTES
"SUBJECTIVE:   Miguelangel Graham is a 71 year old male who presents for Preventive Visit.    Patient has been advised of split billing requirements and indicates understanding: Yes   Are you in the first 12 months of your Medicare coverage?  No    Healthy Habits:    In general, how would you rate your overall health?  Good    Frequency of exercise:  6-7 days/week    Duration of exercise:  45-60 minutes    Do you usually eat at least 4 servings of fruit and vegetables a day, include whole grains    & fiber and avoid regularly eating high fat or \"junk\" foods?  Yes    Taking medications regularly:  Not Applicable    Barriers to taking medications:  Not applicable    Medication side effects:  Not applicable    Ability to successfully perform activities of daily living:  No assistance needed    Home Safety:  No safety concerns identified    Hearing Impairment:  No hearing concerns    In the past 6 months, have you been bothered by leaking of urine?  No    In general, how would you rate your overall mental or emotional health?  Good      PHQ-2 Total Score:    Additional concerns today:  No    Actually is concerned about some fatigue.  Would like some blood work done.  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  Also complains of some right-sided sciatica and left knee that gives out at times.  Do you feel safe in your environment? Yes    Have you ever done Advance Care Planning? (For example, a Health Directive, POLST, or a discussion with a medical provider or your loved ones about your wishes):       Fall risk  Fallen 2 or more times in the past year?: No  Any fall with injury in the past year?: No  Cognitive Screening   1) Repeat 3 items (Leader, Season, Table)   2) Clock draw: NORMAL  3) 3 item recall:Recalls 3 objects  Results: 3 items recalled: COGNITIVE IMPAIRMENT LESS LIKELY    Mini-CogTM Copyright S Ivette. Licensed by the author for use in [facility]; reprinted with " permission (alejandro@[facility]). All rights reserved.        Reviewed and updated as needed this visit by clinical staff  Tobacco  Allergies  Meds   Med Hx  Surg Hx  Fam Hx  Soc Hx       Reviewed and updated as needed this visit by Provider               Social History     Tobacco Use     Smoking status: Former Smoker     Quit date: 1985     Years since quittin.9     Smokeless tobacco: Never Used   Substance Use Topics     Alcohol use: Yes     Alcohol/week: 14.0 standard drinks     Types: 7 Cans of beer, 7 Shots of liquor per week     Comment: 2 daily     If you drink alcohol do you typically have >3 drinks per day or >7 drinks per week? Yes    Alcohol Use 2013   Prescreen: >3 drinks/day or >7 drinks/week? The patient does not drink >3 drinks per day nor >7 drinks per week.   No flowsheet data found.            Current providers sharing in care for this patient include:   Patient Care Team:  No Ref-Primary, Physician as PCP - Gregory Man MD as Assigned PCP    The following health maintenance items are reviewed in Epic and correct as of today:  Health Maintenance Due   Topic Date Due     ANNUAL REVIEW OF HM ORDERS  Never done     ADVANCE CARE PLANNING  Never done     COLORECTAL CANCER SCREENING  Never done     HEPATITIS C SCREENING  Never done     ZOSTER IMMUNIZATION (1 of 2) Never done     LUNG CANCER SCREENING  Never done     Pneumococcal Vaccine: 65+ Years (1 of 1 - PPSV23) Never done     AORTIC ANEURYSM SCREENING (SYSTEM ASSIGNED)  Never done     DTAP/TDAP/TD IMMUNIZATION (2 - Td or Tdap) 2019     PHQ-2  2021     FALL RISK ASSESSMENT  2021     INFLUENZA VACCINE (1) 2021     Labs reviewed in EPIC          Review of Systems  Constitutional, HEENT, cardiovascular, pulmonary, GI, , musculoskeletal, neuro, skin, endocrine and psych systems are negative, except as otherwise noted.    OBJECTIVE:   /72 (BP Location: Right arm, Patient Position: Sitting, Cuff  "Size: Adult Large)   Pulse 86   Temp 97.3  F (36.3  C) (Temporal)   Resp 18   Ht 1.803 m (5' 11\")   Wt 102.1 kg (225 lb)   SpO2 95%   BMI 31.38 kg/m   Estimated body mass index is 31.38 kg/m  as calculated from the following:    Height as of this encounter: 1.803 m (5' 11\").    Weight as of this encounter: 102.1 kg (225 lb).  Physical Exam  GENERAL: healthy, alert and no distress  EYES: Eyes grossly normal to inspection, PERRL and conjunctivae and sclerae normal  HENT: ear canals and TM's normal, nose and mouth without ulcers or lesions  NECK: no adenopathy, no asymmetry, masses, or scars and thyroid normal to palpation  RESP: lungs clear to auscultation - no rales, rhonchi or wheezes  CV: regular rate and rhythm, normal S1 S2, no S3 or S4, no murmur, click or rub, no peripheral edema and peripheral pulses strong  ABDOMEN: soft, nontender, no hepatosplenomegaly, no masses and bowel sounds normal  MS: no gross musculoskeletal defects noted, no edema  SKIN: no suspicious lesions or rashes  NEURO: Normal strength and tone, mentation intact and speech normal  PSYCH: mentation appears normal, affect normal/bright    Diagnostic Test Results:  Labs reviewed in Epic  Results for orders placed or performed in visit on 12/03/21 (from the past 24 hour(s))   CBC with platelets and differential    Narrative    The following orders were created for panel order CBC with platelets and differential.  Procedure                               Abnormality         Status                     ---------                               -----------         ------                     CBC with platelets and d...[[id number]]  Abnormal            Final result                 Please view results for these tests on the individual orders.   Comprehensive metabolic panel (BMP + Alb, Alk Phos, ALT, AST, Total. Bili, TP)   Result Value Ref Range    Sodium 138 133 - 144 mmol/L    Potassium 3.9 3.4 - 5.3 mmol/L    Chloride 108 94 - 109 mmol/L    " Carbon Dioxide (CO2) 24 20 - 32 mmol/L    Anion Gap 6 3 - 14 mmol/L    Urea Nitrogen 17 7 - 30 mg/dL    Creatinine 0.81 0.66 - 1.25 mg/dL    Calcium 8.5 8.5 - 10.1 mg/dL    Glucose 113 (H) 70 - 99 mg/dL    Alkaline Phosphatase 49 40 - 150 U/L    AST 17 0 - 45 U/L    ALT 22 0 - 70 U/L    Protein Total 7.2 6.8 - 8.8 g/dL    Albumin 3.1 (L) 3.4 - 5.0 g/dL    Bilirubin Total 0.6 0.2 - 1.3 mg/dL    GFR Estimate >90 >60 mL/min/1.73m2   Lipid panel reflex to direct LDL Fasting   Result Value Ref Range    Cholesterol 173 <200 mg/dL    Triglycerides 212 (H) <150 mg/dL    Direct Measure HDL 50 >=40 mg/dL    LDL Cholesterol Calculated 81 <=100 mg/dL    Non HDL Cholesterol 123 <130 mg/dL    Patient Fasting > 8hrs? Yes     Narrative    Cholesterol  Desirable:  <200 mg/dL    Triglycerides  Normal:  Less than 150 mg/dL  Borderline High:  150-199 mg/dL  High:  200-499 mg/dL  Very High:  Greater than or equal to 500 mg/dL    Direct Measure HDL  Female:  Greater than or equal to 50 mg/dL   Male:  Greater than or equal to 40 mg/dL    LDL Cholesterol  Desirable:  <100mg/dL  Above Desirable:  100-129 mg/dL   Borderline High:  130-159 mg/dL   High:  160-189 mg/dL   Very High:  >= 190 mg/dL    Non HDL Cholesterol  Desirable:  130 mg/dL  Above Desirable:  130-159 mg/dL  Borderline High:  160-189 mg/dL  High:  190-219 mg/dL  Very High:  Greater than or equal to 220 mg/dL   TSH with free T4 reflex   Result Value Ref Range    TSH 6.49 (H) 0.40 - 4.00 mU/L   PSA, screen   Result Value Ref Range    Prostate Specific Antigen Screen 0.71 0.00 - 4.00 ug/L    Narrative    Assay Method:  Chemiluminescence using Siemens   Vista analyzer.   CBC with platelets and differential   Result Value Ref Range    WBC Count 11.4 (H) 4.0 - 11.0 10e3/uL    RBC Count 4.64 4.40 - 5.90 10e6/uL    Hemoglobin 15.5 13.3 - 17.7 g/dL    Hematocrit 43.8 40.0 - 53.0 %    MCV 94 78 - 100 fL    MCH 33.4 (H) 26.5 - 33.0 pg    MCHC 35.4 31.5 - 36.5 g/dL    RDW 11.9 10.0 - 15.0  %    Platelet Count 183 150 - 450 10e3/uL    % Neutrophils 62 %    % Lymphocytes 24 %    % Monocytes 9 %    % Eosinophils 4 %    % Basophils 1 %    % Immature Granulocytes 0 %    NRBCs per 100 WBC 0 <1 /100    Absolute Neutrophils 7.1 1.6 - 8.3 10e3/uL    Absolute Lymphocytes 2.7 0.8 - 5.3 10e3/uL    Absolute Monocytes 1.0 0.0 - 1.3 10e3/uL    Absolute Eosinophils 0.4 0.0 - 0.7 10e3/uL    Absolute Basophils 0.1 0.0 - 0.2 10e3/uL    Absolute Immature Granulocytes 0.0 <=0.4 10e3/uL    Absolute NRBCs 0.0 10e3/uL   T4 free   Result Value Ref Range    Free T4 0.84 0.76 - 1.46 ng/dL       ASSESSMENT / PLAN:   (Z00.01) Encounter for general adult medical examination with abnormal findings  (primary encounter diagnosis)  Comment: Generally doing fairly well.  See discussion below.  He will seek orthopedic referral for his joints if need be.  Plan:     (R53.83) Fatigue, unspecified type  Comment: Very vague.  His TSH comes back a little high.  We will notify him of this and I think we will just recheck it in probably 6 weeks.  If this is still high then we need to treat him for hypothyroidism.  This could be the cause.  Otherwise further work-up for cardiac etiology might be in order but he denies any chest pain or dyspnea on exertion.  Plan: CBC with platelets and differential, TSH with         free T4 reflex, T4 free            (Z13.6) CARDIOVASCULAR SCREENING; LDL GOAL LESS THAN 160  Comment: We will notify him with results.  Plan: Comprehensive metabolic panel (BMP + Alb, Alk         Phos, ALT, AST, Total. Bili, TP), Lipid panel         reflex to direct LDL Fasting            (Z12.5) Screening for prostate cancer  Comment:   Plan: PSA, screen            (Z12.11) Colon cancer screening  Comment:   Plan: KARINA([employer])              Patient has been advised of split billing requirements and indicates understanding: Yes  COUNSELING:  Reviewed preventive health counseling, as reflected in patient instructions        "Regular exercise       Healthy diet/nutrition       Vision screening    Estimated body mass index is 31.38 kg/m  as calculated from the following:    Height as of this encounter: 1.803 m (5' 11\").    Weight as of this encounter: 102.1 kg (225 lb).    Weight management plan: Discussed healthy diet and exercise guidelines    He reports that he quit smoking about 36 years ago. He has never used smokeless tobacco.      Appropriate preventive services were discussed with this patient, including applicable screening as appropriate for cardiovascular disease, diabetes, osteopenia/osteoporosis, and glaucoma.  As appropriate for age/gender, discussed screening for colorectal cancer, prostate cancer, breast cancer, and cervical cancer. Checklist reviewing preventive services available has been given to the patient.    Reviewed patients plan of care and provided an AVS. The Basic Care Plan (routine screening as documented in Health Maintenance) for Miguelangel meets the Care Plan requirement. This Care Plan has been established and reviewed with the Patient.    Counseling Resources:  ATP IV Guidelines  Pooled Cohorts Equation Calculator  Breast Cancer Risk Calculator  Breast Cancer: Medication to Reduce Risk  FRAX Risk Assessment  ICSI Preventive Guidelines  Dietary Guidelines for Americans, 2010  USDA's MyPlate  ASA Prophylaxis  Lung CA Screening    Gregory Horowitz MD  [facility]    Identified Health Risks:  "

## 2021-12-19 ENCOUNTER — TRANSFERRED RECORDS (OUTPATIENT)
Dept: HEALTH INFORMATION MANAGEMENT | Facility: CLINIC | Age: 71
End: 2021-12-19
Payer: COMMERCIAL

## 2021-12-19 LAB — COLOGUARD-ABSTRACT: POSITIVE

## 2021-12-30 ENCOUNTER — TELEPHONE (OUTPATIENT)
Dept: FAMILY MEDICINE | Facility: CLINIC | Age: 71
End: 2021-12-30
Payer: COMMERCIAL

## 2021-12-30 ENCOUNTER — TELEPHONE (OUTPATIENT)
Dept: GASTROENTEROLOGY | Facility: CLINIC | Age: 71
End: 2021-12-30
Payer: COMMERCIAL

## 2021-12-30 DIAGNOSIS — Z12.11 COLON CANCER SCREENING: Primary | ICD-10-CM

## 2021-12-30 DIAGNOSIS — Z11.59 ENCOUNTER FOR SCREENING FOR OTHER VIRAL DISEASES: ICD-10-CM

## 2021-12-30 NOTE — TELEPHONE ENCOUNTER
Screening Questions  1. Are you active on mychart? YES     2. What insurance is in the chart? BCBS and Medicare     2.  Ordering/Referring Provider: Gregory Horowitz MD     3. BMI 31.4, If greater than 40 review exclusion criteria also will need EXTENDED PREP    4.  Respiratory Screening (If yes to any of the following Hospital setting only):     Do you use daily home oxygen? No   Do you have mod to severe Obstructive Sleep Apnea? No    Do you have Pulmonary Hypertension? No    Do you have UNCONTROLLED asthma?  no    5. Have you had a heart or lung transplant (If yes, please review exclusion criteria) ?  no    6. Are you currently on dialysis or have chronic kidney disease?  No     7. Have you had a stroke or Transient ischemic attack (TIA) within 6 months? No     8. In the past 6 months, have you had any heart related issues including cardiomyopathy or heart attack? No                  If yes, did it require cardiac stenting or other implantable device?      9. Do you have any implantable devices in your body (pacemaker, defib, LVAD)? no    10. Do you take nitroglycerin? If yes, how often? No     11. Are you currently taking any blood thinners? no    12. Are you a diabetic?  No     13. (Females) Are you currently pregnant?   If yes, how many weeks?      15. Are you taking any prescription pain medications on a routine schedule? No  If yes, MAC sedation and patient will need EXTENDED PREP.    16. Do you have any chemical dependencies such as alcohol, street drugs, or methadone? No If yes, MAC sedation.    17. Do you have any history of post-traumatic stress syndrome, severe anxiety or history of psychosis? No     18. Do you transfer independently? Yes     19.  Do you have any issues with constipation? No    If yes, pt will need EXTENDED PREP     20. Preferred Pharmacy for Pre Prescription [facility] [address]     Scheduling Details    Which Colonoscopy Prep was Sent?:  Miralax  Type of Procedure Scheduled: colon  Surgeon: Del  Date of Procedure: 1/18/2022  Location:   Caller (Please ask for phone number if not scheduled by patient): Miguelangel Graham      Sedation Type: MAC  Conscious Sedation- Needs  for 6 hours after the procedure  MAC/General-Needs  for 24 hours after procedure    Pre-op Required at [facility], [place], [place] and OR for MAC sedation: no  (if yes advise patient they will need a pre-op prior to procedure)      Is patient on blood thinners? -no (If yes- inform patient to follow up with PCP or provider for follow up instructions)     Informed patient they will need an adult  yes  Cannot take any type of public or medical transportation alone    Pre-Procedure Covid test to be completed at [facility] or Externally:  Yes at     Confirmed Nurse will call to complete assessment yes    Additional comments: no

## 2021-12-30 NOTE — TELEPHONE ENCOUNTER
----- Message from Gregory Horowitz MD sent at 12/29/2021  5:37 PM CST -----  Let him know that his Cologuard test for colon cancer screening came back positive so he needs to get scheduled for a colonoscopy

## 2021-12-30 NOTE — TELEPHONE ENCOUNTER
Routing to provider to sign orders for colonoscopy. Once completed please route back to pool for patient to be contacted with results.   Paola Mortensen MA

## 2022-01-05 ENCOUNTER — TELEPHONE (OUTPATIENT)
Dept: GASTROENTEROLOGY | Facility: CLINIC | Age: 72
End: 2022-01-05
Payer: COMMERCIAL

## 2022-01-14 ENCOUNTER — LAB (OUTPATIENT)
Dept: LAB | Facility: CLINIC | Age: 72
End: 2022-01-14
Payer: COMMERCIAL

## 2022-01-14 DIAGNOSIS — R53.83 FATIGUE, UNSPECIFIED TYPE: ICD-10-CM

## 2022-01-14 LAB
T4 FREE SERPL-MCNC: 0.8 NG/DL (ref 0.76–1.46)
TSH SERPL DL<=0.005 MIU/L-ACNC: 6.69 MU/L (ref 0.4–4)

## 2022-01-14 PROCEDURE — 84443 ASSAY THYROID STIM HORMONE: CPT

## 2022-01-14 PROCEDURE — 36415 COLL VENOUS BLD VENIPUNCTURE: CPT

## 2022-01-14 PROCEDURE — 84439 ASSAY OF FREE THYROXINE: CPT

## 2022-01-15 ENCOUNTER — LAB (OUTPATIENT)
Dept: LAB | Facility: CLINIC | Age: 72
End: 2022-01-15
Payer: COMMERCIAL

## 2022-01-15 DIAGNOSIS — Z11.59 ENCOUNTER FOR SCREENING FOR OTHER VIRAL DISEASES: ICD-10-CM

## 2022-01-15 PROCEDURE — U0005 INFEC AGEN DETEC AMPLI PROBE: HCPCS

## 2022-01-15 PROCEDURE — U0003 INFECTIOUS AGENT DETECTION BY NUCLEIC ACID (DNA OR RNA); SEVERE ACUTE RESPIRATORY SYNDROME CORONAVIRUS 2 (SARS-COV-2) (CORONAVIRUS DISEASE [COVID-19]), AMPLIFIED PROBE TECHNIQUE, MAKING USE OF HIGH THROUGHPUT TECHNOLOGIES AS DESCRIBED BY CMS-2020-01-R: HCPCS

## 2022-01-16 LAB — SARS-COV-2 RNA RESP QL NAA+PROBE: NEGATIVE

## 2022-01-18 ENCOUNTER — ANESTHESIA EVENT (OUTPATIENT)
Dept: GASTROENTEROLOGY | Facility: CLINIC | Age: 72
End: 2022-01-18
Payer: MEDICARE

## 2022-01-18 ASSESSMENT — LIFESTYLE VARIABLES: TOBACCO_USE: 1

## 2022-01-19 ENCOUNTER — HOSPITAL ENCOUNTER (OUTPATIENT)
Facility: CLINIC | Age: 72
Discharge: HOME | End: 2022-01-19
Attending: INTERNAL MEDICINE | Admitting: INTERNAL MEDICINE
Payer: MEDICARE

## 2022-01-19 ENCOUNTER — ANESTHESIA (OUTPATIENT)
Dept: GASTROENTEROLOGY | Facility: CLINIC | Age: 72
End: 2022-01-19
Payer: MEDICARE

## 2022-01-19 VITALS
WEIGHT: 225 LBS | TEMPERATURE: 98 F | HEART RATE: 74 BPM | SYSTOLIC BLOOD PRESSURE: 130 MMHG | DIASTOLIC BLOOD PRESSURE: 93 MMHG | BODY MASS INDEX: 31.38 KG/M2 | OXYGEN SATURATION: 93 % | RESPIRATION RATE: 16 BRPM

## 2022-01-19 LAB — COLONOSCOPY: NORMAL

## 2022-01-19 PROCEDURE — 45380 COLONOSCOPY AND BIOPSY: CPT | Performed by: INTERNAL MEDICINE

## 2022-01-19 PROCEDURE — 45385 COLONOSCOPY W/LESION REMOVAL: CPT | Mod: PT | Performed by: INTERNAL MEDICINE

## 2022-01-19 PROCEDURE — 370N000017 HC ANESTHESIA TECHNICAL FEE, PER MIN: Performed by: INTERNAL MEDICINE

## 2022-01-19 PROCEDURE — 250N000009 HC RX 250: Performed by: NURSE ANESTHETIST, CERTIFIED REGISTERED

## 2022-01-19 PROCEDURE — 88305 TISSUE EXAM BY PATHOLOGIST: CPT | Mod: TC | Performed by: INTERNAL MEDICINE

## 2022-01-19 PROCEDURE — 250N000011 HC RX IP 250 OP 636: Performed by: NURSE ANESTHETIST, CERTIFIED REGISTERED

## 2022-01-19 PROCEDURE — 258N000003 HC RX IP 258 OP 636: Performed by: NURSE ANESTHETIST, CERTIFIED REGISTERED

## 2022-01-19 PROCEDURE — 45382 COLONOSCOPY W/CONTROL BLEED: CPT | Performed by: INTERNAL MEDICINE

## 2022-01-19 RX ORDER — LIDOCAINE HYDROCHLORIDE 20 MG/ML
INJECTION, SOLUTION INFILTRATION; PERINEURAL PRN
Status: DISCONTINUED | OUTPATIENT
Start: 2022-01-19 | End: 2022-01-19

## 2022-01-19 RX ORDER — ONDANSETRON 4 MG/1
4 TABLET, ORALLY DISINTEGRATING ORAL EVERY 30 MIN PRN
Status: DISCONTINUED | OUTPATIENT
Start: 2022-01-19 | End: 2022-01-19 | Stop reason: HOSPADM

## 2022-01-19 RX ORDER — SODIUM CHLORIDE, SODIUM LACTATE, POTASSIUM CHLORIDE, CALCIUM CHLORIDE 600; 310; 30; 20 MG/100ML; MG/100ML; MG/100ML; MG/100ML
INJECTION, SOLUTION INTRAVENOUS CONTINUOUS
Status: DISCONTINUED | OUTPATIENT
Start: 2022-01-19 | End: 2022-01-19 | Stop reason: HOSPADM

## 2022-01-19 RX ORDER — PROPOFOL 10 MG/ML
INJECTION, EMULSION INTRAVENOUS PRN
Status: DISCONTINUED | OUTPATIENT
Start: 2022-01-19 | End: 2022-01-19

## 2022-01-19 RX ORDER — LIDOCAINE 40 MG/G
CREAM TOPICAL
Status: DISCONTINUED | OUTPATIENT
Start: 2022-01-19 | End: 2022-01-19 | Stop reason: HOSPADM

## 2022-01-19 RX ORDER — PROPOFOL 10 MG/ML
INJECTION, EMULSION INTRAVENOUS CONTINUOUS PRN
Status: DISCONTINUED | OUTPATIENT
Start: 2022-01-19 | End: 2022-01-19

## 2022-01-19 RX ORDER — ALBUTEROL SULFATE 0.83 MG/ML
2.5 SOLUTION RESPIRATORY (INHALATION) EVERY 4 HOURS PRN
Status: DISCONTINUED | OUTPATIENT
Start: 2022-01-19 | End: 2022-01-19 | Stop reason: HOSPADM

## 2022-01-19 RX ORDER — ONDANSETRON 2 MG/ML
4 INJECTION INTRAMUSCULAR; INTRAVENOUS EVERY 30 MIN PRN
Status: DISCONTINUED | OUTPATIENT
Start: 2022-01-19 | End: 2022-01-19 | Stop reason: HOSPADM

## 2022-01-19 RX ADMIN — PROPOFOL 50 MG: 10 INJECTION, EMULSION INTRAVENOUS at 09:40

## 2022-01-19 RX ADMIN — LIDOCAINE HYDROCHLORIDE 60 MG: 20 INJECTION, SOLUTION INFILTRATION; PERINEURAL at 09:40

## 2022-01-19 RX ADMIN — SODIUM CHLORIDE, POTASSIUM CHLORIDE, SODIUM LACTATE AND CALCIUM CHLORIDE: 600; 310; 30; 20 INJECTION, SOLUTION INTRAVENOUS at 09:23

## 2022-01-19 RX ADMIN — PROPOFOL 200 MCG/KG/MIN: 10 INJECTION, EMULSION INTRAVENOUS at 09:40

## 2022-01-19 NOTE — ANESTHESIA CARE TRANSFER NOTE
Patient: Miguelangel Graham    Procedure: Procedure(s):  COLONOSCOPY, WITH POLYPECTOMY  COLONOSCOPY, WITH HEMORRHAGE CONTROL       Diagnosis: Colon cancer screening [Z12.11]  Diagnosis Additional Information: No value filed.    Anesthesia Type:   MAC     Note:    Oropharynx: oropharynx clear of all foreign objects and spontaneously breathing  Level of Consciousness: drowsy  Oxygen Supplementation: face mask    Independent Airway: airway patency satisfactory and stable  Dentition: dentition unchanged  Vital Signs Stable: post-procedure vital signs reviewed and stable  Report to RN Given: handoff report given  Patient transferred to: Phase II    Handoff Report: Identifed the Patient, Identified the Reponsible Provider, Reviewed the pertinent medical history, Discussed the surgical course, Reviewed Intra-OP anesthesia mangement and issues during anesthesia, Set expectations for post-procedure period and Allowed opportunity for questions and acknowledgement of understanding      Vitals:  Vitals Value Taken Time   /87 01/19/22 1030   Temp     Pulse 67 01/19/22 1030   Resp     SpO2 96 % 01/19/22 1034   Vitals shown include unvalidated device data.    Electronically Signed By: POONAM Manjarrez CRNA  January 19, 2022  10:35 AM

## 2022-01-19 NOTE — DISCHARGE INSTRUCTIONS
[facility]    Home Care Following Endoscopy          Activity:    You have just undergone an endoscopic procedure usually performed with conscious sedation.  Do not work or operate machinery (including a car) for at least 12 hours.      I encourage you to walk and attempt to pass this air as soon as possible.    Diet:    Return to the diet you were on before your procedure but eat lightly for the first 12-24 hours.    Drink plenty of water.    Resume any regular medications unless otherwise advised by your physician.  Please begin any new medication prescribed as a result of your procedure as directed by your physician.     If you had any biopsy or polyp removed please refrain from aspirin or aspirin products; no ibuprofen or motrin for 10 days.  If on Coumadin please restart as instructed by your physician.   Pain:    You may take Tylenol as needed for pain.  Expected Recovery:    You can expect some mild abdominal fullness and/or discomfort due to the air used to inflate your intestinal tract.     Call Your Physician if You Have:      After Colonoscopy:  o Worsening persisting abdominal pain which is worse with activity.  o Fevers (>101 degrees F), chills or shakes.  o Passage of continued blood with bowel movements.   Any questions or concerns about your recovery, please call [phone] or after hours [phone] Nurse Advice Line.    Follow-up Care:    You should receive a call or letter with your results within 2 weeks. Please call if you have not received a notification of your results.  If asked to return to clinic please make an appointment 1 week after your procedure.  Call [phone].

## 2022-01-19 NOTE — ANESTHESIA PREPROCEDURE EVALUATION
Anesthesia Pre-Procedure Evaluation    Patient: Miguelangel Graham   MRN: [id number] : 1950        Preoperative Diagnosis: Colon cancer screening [Z12.11]    Procedure : Procedure(s):  COLONOSCOPY          Past Medical History:   Diagnosis Date     PONV (postoperative nausea and vomiting) 2017    shoulder surgery      Past Surgical History:   Procedure Laterality Date     ARTHROSCOPY SHOULDER BICEPS TENODESIS REPAIR Right 2017    Procedure: ARTHROSCOPY SHOULDER BICEPS TENODESIS REPAIR;;  Surgeon: Jose Jones DO;  Location: [facility] OR     ARTHROSCOPY SHOULDER DECOMPRESSION Right 2017    Procedure: ARTHROSCOPY SHOULDER DECOMPRESSION;;  Surgeon: Jose Jones DO;  Location: [facility] OR     ARTHROSCOPY SHOULDER DISTAL CLAVICLE REPAIR Right 2017    Procedure: ARTHROSCOPY SHOULDER DISTAL CLAVICLE RESECTION;;  Surgeon: Jose Jones DO;  Location: [facility] OR     ARTHROSCOPY SHOULDER, OPEN ROTATOR CUFF REPAIR, COMBINED Right 2017    Procedure: COMBINED ARTHROSCOPY SHOULDER, OPEN ROTATOR CUFF REPAIR;  right shoulder arthroscopy with open mini rotator cuff repair, biceps tenodesis, distal clavicle and subacromial decompression with partial acromioplasty and removal of foreing body;  Surgeon: Jose Jones DO;  Location: [facility] OR     HC KNEE SCOPE, DIAGNOSTIC      Arthroscopy, Knee     HC REPAIR ROTATOR CUFF,CHRONIC       PHACOEMULSIFICATION WITH STANDARD INTRAOCULAR LENS IMPLANT Right 10/17/2019    Procedure: PHACOEMULSIFICATION, CATARACT Right WITH STANDARD IOL INSERTION;  Surgeon: Jf Montiel MD;  Location: [facility]     PHACOEMULSIFICATION WITH STANDARD INTRAOCULAR LENS IMPLANT Left 10/31/2019    Procedure: PHACOEMULSIFICATION, CATARACT Left eye WITH STANDARD IOL INSERTION;  Surgeon: Jf Montiel MD;  Location: [facility]     REMOVE FOREIGN BODY UPPER EXTREMITY Right 2017    Procedure: REMOVE FOREIGN BODY UPPER EXTREMITY;;  Surgeon: Robert  Jose Berry DO;  Location: [facility] OR      Allergies   Allergen Reactions     No Known Drug Allergies       Social History     Tobacco Use     Smoking status: Former Smoker     Quit date: 1985     Years since quittin.0     Smokeless tobacco: Never Used   Substance Use Topics     Alcohol use: Yes     Alcohol/week: 14.0 standard drinks     Types: 7 Cans of beer, 7 Shots of liquor per week     Comment: 2 daily      Wt Readings from Last 1 Encounters:   21 102.1 kg (225 lb)        Anesthesia Evaluation   Pt has not had prior anesthetic         ROS/MED HX  ENT/Pulmonary: Comment: Quit Smokin85    (+) PA risk factors, snores loudly, hypertension, tobacco use, Past use,     Neurologic:  - neg neurologic ROS     Cardiovascular:  - neg cardiovascular ROS   (+) -----Previous cardiac testing   Echo: Date: Results:    Stress Test: Date: Results:    ECG Reviewed: Date: 2020 Results:  Marked sinus Bradycardia   BORDERLINE RHYTHM  Cath: Date: Results:      METS/Exercise Tolerance:     Hematologic:  - neg hematologic  ROS     Musculoskeletal: Comment: CLBP, bilat hip pain, left knee pain      GI/Hepatic:     (+) GERD (takes tums after breakfast almost daily),     Renal/Genitourinary:     (+) Nephrolithiasis ,     Endo:  - neg endo ROS     Psychiatric/Substance Use:  - neg psychiatric ROS     Infectious Disease:  - neg infectious disease ROS     Malignancy:  - neg malignancy ROS     Other:  - neg other ROS          Physical Exam    Airway  airway exam normal      Mallampati: II   TM distance: > 3 FB   Neck ROM: full   Mouth opening: > 3 cm    Respiratory Devices and Support         Dental  no notable dental history         Cardiovascular   cardiovascular exam normal       Rhythm and rate: regular and normal     Pulmonary   pulmonary exam normal        breath sounds clear to auscultation           OUTSIDE LABS:  CBC:   Lab Results   Component Value Date    WBC 11.4 (H) 2021    WBC 6.5 2020     HGB 15.5 12/03/2021    HGB 16.7 01/16/2020    HCT 43.8 12/03/2021    HCT 47.6 01/16/2020     12/03/2021     01/16/2020     BMP:   Lab Results   Component Value Date     12/03/2021     01/16/2020    POTASSIUM 3.9 12/03/2021    POTASSIUM 4.2 01/16/2020    CHLORIDE 108 12/03/2021    CHLORIDE 106 01/16/2020    CO2 24 12/03/2021    CO2 28 01/16/2020    BUN 17 12/03/2021    BUN 11 01/16/2020    CR 0.81 12/03/2021    CR 0.82 01/16/2020     (H) 12/03/2021     (H) 01/16/2020     COAGS: No results found for: PTT, INR, FIBR  POC: No results found for: BGM, HCG, HCGS  HEPATIC:   Lab Results   Component Value Date    ALBUMIN 3.1 (L) 12/03/2021    PROTTOTAL 7.2 12/03/2021    ALT 22 12/03/2021    AST 17 12/03/2021    ALKPHOS 49 12/03/2021    BILITOTAL 0.6 12/03/2021     OTHER:   Lab Results   Component Value Date    PEPPER 8.5 12/03/2021    MAG 1.9 01/09/2005    TSH 6.69 (H) 01/14/2022    T4 0.80 01/14/2022       Anesthesia Plan    ASA Status:  2   NPO Status:  NPO Appropriate    Anesthesia Type: MAC.     - Reason for MAC: straight local not clinically adequate   Induction: Intravenous, Propofol.   Maintenance: TIVA.        Consents    Anesthesia Plan(s) and associated risks, benefits, and realistic alternatives discussed. Questions answered and patient/representative(s) expressed understanding.    - Discussed:     - Discussed with:  Patient      - Extended Intubation/Ventilatory Support Discussed: No.      - Patient is DNR/DNI Status: No    Use of blood products discussed: No .     Postoperative Care    Pain management: IV analgesics.        Comments:    Other Comments: The risks and benefits of anesthesia, and the alternatives where applicable, have been discussed with the patient, and they wish to proceed.            POONAM Hinson CRNA

## 2022-01-19 NOTE — ANESTHESIA POSTPROCEDURE EVALUATION
Patient: Miguelangel Graham    Procedure: Procedure(s):  COLONOSCOPY, WITH POLYPECTOMY  COLONOSCOPY, WITH HEMORRHAGE CONTROL       Diagnosis:Colon cancer screening [Z12.11]  Diagnosis Additional Information: No value filed.    Anesthesia Type:  MAC    Note:  Disposition: Outpatient   Postop Pain Control: Uneventful            Sign Out: Well controlled pain   PONV: No   Neuro/Psych: Uneventful            Sign Out: Acceptable/Baseline neuro status   Airway/Respiratory: Uneventful            Sign Out: Acceptable/Baseline resp. status   CV/Hemodynamics: Uneventful            Sign Out: Acceptable CV status   Other NRE: NONE   DID A NON-ROUTINE EVENT OCCUR? No    Event details/Postop Comments:  Pt was happy with anesthesia care.  No complications.  I will follow up with the pt if needed.           Last vitals:  Vitals Value Taken Time   /87 01/19/22 1030   Temp     Pulse 67 01/19/22 1030   Resp     SpO2 91 % 01/19/22 1035   Vitals shown include unvalidated device data.    Electronically Signed By: POONAM Manjarrez CRNA  January 19, 2022  10:36 AM

## 2022-01-19 NOTE — H&P
[facility] Anesthesia Pre-op History and Physical    Miguelangel Graham MRN# [id number]   Age: 71 year old YOB: 1950      Date of Surgery: 1/19/22 [facility]      Date of Exam 1/19/2022 Facility (Same day)       Home clinic:   Primary care provider: Gregory Horowitz         Chief Complaint and/or Reason for Procedure:   No chief complaint on file.    Colonoscopy, hx polyps.       Active problem list:     Patient Active Problem List    Diagnosis Date Noted     Internal derangement of right shoulder 01/16/2020     Priority: Medium     Kidney stone 02/07/2019     Priority: Medium     S/P rotator cuff repair 03/15/2018     Priority: Medium     Primary osteoarthritis of right hip 01/22/2018     Priority: Medium     AC joint arthropathy 10/25/2017     Priority: Medium     Biceps tendonitis, right 10/25/2017     Priority: Medium     Complete tear of right rotator cuff 10/25/2017     Priority: Medium     CARDIOVASCULAR SCREENING; LDL GOAL LESS THAN 160 10/31/2010     Priority: Medium            Medications (include herbals and vitamins):   Any Plavix use in the last 7 days?  No     No current facility-administered medications for this encounter.     No current outpatient medications on file.             Allergies:      Allergies   Allergen Reactions     No Known Drug Allergies      Allergy to Latex?  No  Allergy to tape?    No  Intolerances: NA            Physical Exam:   All vitals have been reviewed  No data found.  No intake/output data recorded.  Lungs:   No increased work of breathing, good air exchange, clear to auscultation bilaterally, no crackles or wheezing     Cardiovascular:   Normal apical impulse, regular rate and rhythm, normal S1 and S2, no S3 or S4, and no murmur noted             Lab / Radiology Results:            Anesthetic risk and/or ASA classification:       John Mathis MD

## 2022-01-19 NOTE — LETTER
"January 21, 2022      Miguelangel Graham  [address] [id number]        Dear :    We are writing to inform you of your test results.    The polyps are both adenomas and hyperplastic.  All benign and non-concerning.       A repeat colon exam in 3 years is suggested.      Resulted Orders   Surgical Pathology Exam   Result Value Ref Range    Case Report       Surgical Pathology Report                         Case: [id number]                                  Authorizing Provider:  John Mathis MD        Collected:           01/19/2022 09:55 AM          Ordering Location:     [facility]          Received:            01/19/2022 10:42 AM                                 [facility]                                                          Pathologist:           Pauly Younger MD PhD                                                      Specimens:   A) - Large Intestine, Colon, Transverse                                                             B) - Large Intestine, Colon, Sigmoid                                                                C) - Rectum                                                                                Final Diagnosis       A(1). Colon, Transverse, polyp, polypectomy:  -Tubular adenoma  -Negative for high-grade dysplasia and malignancy.    B(2).  Colon, Sigmoid, polyps x3, polypectomy:  -Tubular adenomas  -Negative for high-grade dysplasia and malignancy.    C(3). Colon, Rectum, polyp, polypectomy:  -Hyperplastic polyp  -Negative for dysplasia or malignancy.          Clinical Information       Screening      Gross Description       A(1). Large Intestine, Colon, Transverse, :  The specimen is received in formalin, labeled with the patient's name, medical record number and other identifying information designated \"transverse colon polyp\". It consists of tan polypoid structure measuring 0.5 x 0.5 x 0.3 cm.  The specimen is inked black and bisected.  The " "specimen is entirely submitted in 1 cassette.      B(2). Large Intestine, Colon, Sigmoid, :  The specimen is received in formalin, labeled with the patient's name, medical record number and other identifying information designated \"sigmoid colon polyp\". It consists of 3 tan polypoid structures (0.3 x 0.3 x 0 x 0.2 cm and 0.5 x 0.4 x 0.4 cm and 1.5 x 1.2 x 0.9 cm).  The specimens are inked yellow, black, blue respectively.  The black and blue polyp are serially sent specimens are entirely submitted in 1 cassette.      C(3). Rectum, :  The specimen is received in formalin, labeled with the patient's name, medical record number and other identifying information designated \"rectal polyp\". It consists of tan polypoid structure measuring 0.5 x 0.4 x 0.2 cm.  The specimen is inked blue and bisected.  The specimen is entirely submitted in 1 cassette.    ([facility])        Microscopic Description       Microscopic examination was performed.      Performing Labs       The technical component of this testing was completed at [facility]      Case Images         If you have any questions or concerns, please call the clinic at the number listed above.       Sincerely,      John Mathis MD          "

## 2022-01-21 LAB
PATH REPORT.COMMENTS IMP SPEC: NORMAL
PATH REPORT.COMMENTS IMP SPEC: NORMAL
PATH REPORT.FINAL DX SPEC: NORMAL
PATH REPORT.GROSS SPEC: NORMAL
PATH REPORT.MICROSCOPIC SPEC OTHER STN: NORMAL
PATH REPORT.RELEVANT HX SPEC: NORMAL
PHOTO IMAGE: NORMAL

## 2022-01-21 PROCEDURE — 88305 TISSUE EXAM BY PATHOLOGIST: CPT | Mod: 26 | Performed by: PATHOLOGY

## 2022-02-28 ENCOUNTER — TELEPHONE (OUTPATIENT)
Dept: FAMILY MEDICINE | Facility: CLINIC | Age: 72
End: 2022-02-28
Payer: COMMERCIAL

## 2022-02-28 DIAGNOSIS — E03.9 HYPOTHYROIDISM: Primary | ICD-10-CM

## 2022-02-28 DIAGNOSIS — E03.9 HYPOTHYROIDISM, UNSPECIFIED TYPE: ICD-10-CM

## 2022-02-28 RX ORDER — LEVOTHYROXINE SODIUM 50 UG/1
50 TABLET ORAL DAILY
Qty: 90 TABLET | Refills: 3 | Status: SHIPPED | OUTPATIENT
Start: 2022-02-28 | End: 2023-08-07

## 2022-02-28 NOTE — TELEPHONE ENCOUNTER
Pt informed that Rx will be sent to pharmacy and to recheck TSH in 2-3 months.  Rx and orders are pending, please sign.

## 2022-02-28 NOTE — TELEPHONE ENCOUNTER
Reason for Call:  Other call back    Detailed comments: Pharmacy called stating patient was in to  Synthroid but there was not an order. It looked like in a lab result Dr. Horowitz had mentioned starting the patient on Synthroid but no prescription was done. The pharmacy is wondering if this could be ordered or if Dr. Horowitz no longer wants it.     Phone Number Patient can be reached at: Other phone number:  [phone]    Best Time: Any    Can we leave a detailed message on this number? YES    Call taken on 2/28/2022 at 11:05 AM by Jade Lincoln

## 2023-03-03 ENCOUNTER — HOSPITAL ENCOUNTER (EMERGENCY)
Facility: CLINIC | Age: 73
Discharge: HOME | End: 2023-03-03
Attending: FAMILY MEDICINE | Admitting: FAMILY MEDICINE
Payer: MEDICARE

## 2023-03-03 ENCOUNTER — APPOINTMENT (OUTPATIENT)
Dept: CT IMAGING | Facility: CLINIC | Age: 73
End: 2023-03-03
Attending: FAMILY MEDICINE
Payer: MEDICARE

## 2023-03-03 VITALS
WEIGHT: 229.6 LBS | RESPIRATION RATE: 18 BRPM | HEART RATE: 50 BPM | OXYGEN SATURATION: 94 % | BODY MASS INDEX: 32.14 KG/M2 | DIASTOLIC BLOOD PRESSURE: 96 MMHG | SYSTOLIC BLOOD PRESSURE: 135 MMHG | HEIGHT: 71 IN | TEMPERATURE: 97.6 F

## 2023-03-03 DIAGNOSIS — H81.10 BENIGN PAROXYSMAL POSITIONAL VERTIGO, UNSPECIFIED LATERALITY: ICD-10-CM

## 2023-03-03 LAB
ANION GAP SERPL CALCULATED.3IONS-SCNC: 11 MMOL/L (ref 7–15)
BASOPHILS # BLD AUTO: 0.1 10E3/UL (ref 0–0.2)
BASOPHILS NFR BLD AUTO: 1 %
BUN SERPL-MCNC: 13.8 MG/DL (ref 8–23)
CALCIUM SERPL-MCNC: 9.3 MG/DL (ref 8.8–10.2)
CHLORIDE SERPL-SCNC: 99 MMOL/L (ref 98–107)
CREAT SERPL-MCNC: 0.87 MG/DL (ref 0.67–1.17)
DEPRECATED HCO3 PLAS-SCNC: 24 MMOL/L (ref 22–29)
EOSINOPHIL # BLD AUTO: 0.2 10E3/UL (ref 0–0.7)
EOSINOPHIL NFR BLD AUTO: 2 %
ERYTHROCYTE [DISTWIDTH] IN BLOOD BY AUTOMATED COUNT: 12.3 % (ref 10–15)
GFR SERPL CREATININE-BSD FRML MDRD: >90 ML/MIN/1.73M2
GLUCOSE SERPL-MCNC: 136 MG/DL (ref 70–99)
HCT VFR BLD AUTO: 44.9 % (ref 40–53)
HGB BLD-MCNC: 15.8 G/DL (ref 13.3–17.7)
IMM GRANULOCYTES # BLD: 0 10E3/UL
IMM GRANULOCYTES NFR BLD: 0 %
LYMPHOCYTES # BLD AUTO: 2.4 10E3/UL (ref 0.8–5.3)
LYMPHOCYTES NFR BLD AUTO: 24 %
MCH RBC QN AUTO: 33 PG (ref 26.5–33)
MCHC RBC AUTO-ENTMCNC: 35.2 G/DL (ref 31.5–36.5)
MCV RBC AUTO: 94 FL (ref 78–100)
MONOCYTES # BLD AUTO: 0.7 10E3/UL (ref 0–1.3)
MONOCYTES NFR BLD AUTO: 8 %
NEUTROPHILS # BLD AUTO: 6.4 10E3/UL (ref 1.6–8.3)
NEUTROPHILS NFR BLD AUTO: 65 %
NRBC # BLD AUTO: 0 10E3/UL
NRBC BLD AUTO-RTO: 0 /100
PLATELET # BLD AUTO: 168 10E3/UL (ref 150–450)
POTASSIUM SERPL-SCNC: 3.9 MMOL/L (ref 3.4–5.3)
RBC # BLD AUTO: 4.79 10E6/UL (ref 4.4–5.9)
SODIUM SERPL-SCNC: 134 MMOL/L (ref 136–145)
WBC # BLD AUTO: 9.7 10E3/UL (ref 4–11)

## 2023-03-03 PROCEDURE — 93010 ELECTROCARDIOGRAM REPORT: CPT | Performed by: FAMILY MEDICINE

## 2023-03-03 PROCEDURE — 93005 ELECTROCARDIOGRAM TRACING: CPT | Performed by: FAMILY MEDICINE

## 2023-03-03 PROCEDURE — 250N000011 HC RX IP 250 OP 636: Performed by: FAMILY MEDICINE

## 2023-03-03 PROCEDURE — 250N000009 HC RX 250: Performed by: FAMILY MEDICINE

## 2023-03-03 PROCEDURE — 80048 BASIC METABOLIC PNL TOTAL CA: CPT | Performed by: FAMILY MEDICINE

## 2023-03-03 PROCEDURE — 99285 EMERGENCY DEPT VISIT HI MDM: CPT | Mod: 25 | Performed by: FAMILY MEDICINE

## 2023-03-03 PROCEDURE — 85025 COMPLETE CBC W/AUTO DIFF WBC: CPT | Performed by: FAMILY MEDICINE

## 2023-03-03 PROCEDURE — 70450 CT HEAD/BRAIN W/O DYE: CPT | Mod: MA,XS

## 2023-03-03 PROCEDURE — 36415 COLL VENOUS BLD VENIPUNCTURE: CPT | Performed by: FAMILY MEDICINE

## 2023-03-03 PROCEDURE — 70498 CT ANGIOGRAPHY NECK: CPT | Mod: MA

## 2023-03-03 PROCEDURE — 70496 CT ANGIOGRAPHY HEAD: CPT | Mod: MA

## 2023-03-03 PROCEDURE — 99284 EMERGENCY DEPT VISIT MOD MDM: CPT | Mod: 25 | Performed by: FAMILY MEDICINE

## 2023-03-03 RX ORDER — MECLIZINE HCL 12.5 MG 12.5 MG/1
12.5 TABLET ORAL 4 TIMES DAILY PRN
Qty: 30 TABLET | Refills: 0 | Status: SHIPPED | OUTPATIENT
Start: 2023-03-03 | End: 2023-08-07

## 2023-03-03 RX ORDER — IOPAMIDOL 755 MG/ML
200 INJECTION, SOLUTION INTRAVASCULAR ONCE
Status: COMPLETED | OUTPATIENT
Start: 2023-03-03 | End: 2023-03-03

## 2023-03-03 RX ORDER — ONDANSETRON 4 MG/1
4 TABLET, ORALLY DISINTEGRATING ORAL ONCE
Status: COMPLETED | OUTPATIENT
Start: 2023-03-03 | End: 2023-03-03

## 2023-03-03 RX ADMIN — ONDANSETRON 4 MG: 4 TABLET, ORALLY DISINTEGRATING ORAL at 21:30

## 2023-03-03 RX ADMIN — SODIUM CHLORIDE 100 ML: 9 INJECTION, SOLUTION INTRAVENOUS at 22:37

## 2023-03-03 RX ADMIN — IOPAMIDOL 80 ML: 755 INJECTION, SOLUTION INTRAVENOUS at 22:37

## 2023-03-03 ASSESSMENT — ACTIVITIES OF DAILY LIVING (ADL)
ADLS_ACUITY_SCORE: 35
ADLS_ACUITY_SCORE: 33

## 2023-03-04 RX ORDER — MECLIZINE HYDROCHLORIDE 25 MG/1
25 TABLET ORAL ONCE
Status: CANCELLED | OUTPATIENT
Start: 2023-03-03 | End: 2023-03-03

## 2023-03-04 ASSESSMENT — ENCOUNTER SYMPTOMS
RESPIRATORY NEGATIVE: 1
DIZZINESS: 1
WEAKNESS: 0
FEVER: 0
SPEECH DIFFICULTY: 0
POLYDIPSIA: 0
HEADACHES: 1
MUSCULOSKELETAL NEGATIVE: 1
NUMBNESS: 0
NAUSEA: 1
CHILLS: 0
PHOTOPHOBIA: 0
PSYCHIATRIC NEGATIVE: 1
CARDIOVASCULAR NEGATIVE: 1
EYES NEGATIVE: 1
VOMITING: 0
POLYPHAGIA: 0

## 2023-03-04 NOTE — ED PROVIDER NOTES
History     Chief Complaint   Patient presents with     Dizziness     Nausea     HPI  Miguelangel Graham is a 72 year old male who presents to the emergency room today with his wife secondary to concerns of feeling somewhat dizzy started a couple hours ago.  Patient states that he has had episodes in the past have been similar but they usually go away fairly quickly but this time this 1 is lasted longer.  He states that he felt somewhat nauseated associated with the feeling of being somewhat off balance.  He admits to maybe a slight sense of spinning.  He does admit to having a headache that he took some ibuprofen with earlier today and headache is now resolved.  He states that he typically does not get headaches although did have episodes of headaches associated with a trial of thyroid replacement therapy a couple years ago.  He thought he should come in and maybe get his blood sugar checked to make sure he is not diabetic as a reason for symptoms today.  He denies any history for diabetes.  States his last physical was about a year ago.  He states that his physician, Dr. Javad Horowitz, has retired.  Patient denies any changes in his vision, hearing, difficulties with his speech, sense of numbness, sense of taste, sense of smell, and also denies any weakness to his extremities.    Allergies:  Allergies   Allergen Reactions     No Known Drug Allergies        Problem List:    Patient Active Problem List    Diagnosis Date Noted     Internal derangement of right shoulder 01/16/2020     Priority: Medium     Kidney stone 02/07/2019     Priority: Medium     S/P rotator cuff repair 03/15/2018     Priority: Medium     Primary osteoarthritis of right hip 01/22/2018     Priority: Medium     AC joint arthropathy 10/25/2017     Priority: Medium     Biceps tendonitis, right 10/25/2017     Priority: Medium     Complete tear of right rotator cuff 10/25/2017     Priority: Medium     CARDIOVASCULAR SCREENING; LDL GOAL LESS THAN 160  10/31/2010     Priority: Medium        Past Medical History:    Past Medical History:   Diagnosis Date     PONV (postoperative nausea and vomiting) 12/12/2017       Past Surgical History:    Past Surgical History:   Procedure Laterality Date     ARTHROSCOPY SHOULDER BICEPS TENODESIS REPAIR Right 12/12/2017    Procedure: ARTHROSCOPY SHOULDER BICEPS TENODESIS REPAIR;;  Surgeon: Jose Jones DO;  Location[facility]     ARTHROSCOPY SHOULDER DECOMPRESSION Right 12/12/2017    Procedure: ARTHROSCOPY SHOULDER DECOMPRESSION;;  Surgeon: Jose Jones DO;  Location: [facility]     ARTHROSCOPY SHOULDER DISTAL CLAVICLE REPAIR Right 12/12/2017    Procedure: ARTHROSCOPY SHOULDER DISTAL CLAVICLE RESECTION;;  Surgeon: Jose Jones DO;  Location: [facility]     ARTHROSCOPY SHOULDER, OPEN ROTATOR CUFF REPAIR, COMBINED Right 12/12/2017    Procedure: COMBINED ARTHROSCOPY SHOULDER, OPEN ROTATOR CUFF REPAIR;  right shoulder arthroscopy with open mini rotator cuff repair, biceps tenodesis, distal clavicle and subacromial decompression with partial acromioplasty and removal of foreing body;  Surgeon: Jose Jones DO;  Location: [facility]     COLONOSCOPY N/A 1/19/2022    Procedure: COLONOSCOPY, WITH POLYPECTOMY;  Surgeon: John Mathis MD;  Location: [facility]     HC KNEE SCOPE, DIAGNOSTIC      Arthroscopy, Knee     HC REPAIR ROTATOR CUFF,CHRONIC       PHACOEMULSIFICATION WITH STANDARD INTRAOCULAR LENS IMPLANT Right 10/17/2019    Procedure: PHACOEMULSIFICATION, CATARACT Right WITH STANDARD IOL INSERTION;  Surgeon: Jf Montiel MD;  Location[facility]     PHACOEMULSIFICATION WITH STANDARD INTRAOCULAR LENS IMPLANT Left 10/31/2019    Procedure: PHACOEMULSIFICATION, CATARACT Left eye WITH STANDARD IOL INSERTION;  Surgeon: Jf Montiel MD;  Location: [facility]     REMOVE FOREIGN BODY UPPER EXTREMITY Right 12/12/2017    Procedure: REMOVE FOREIGN BODY UPPER EXTREMITY;;  Surgeon: Jose Jones  "DO Jamal;  Location:  OR       Family History:    Family History   Problem Relation Age of Onset     EMMY.ADOLFO. Brother      KELLY. Brother      KELLY. Brother      KELLY. Brother      KELLY. Brother      Diabetes Sister      Diabetes Sister      Diabetes Brother      Diabetes Brother      Diabetes Brother        Social History:  Marital Status:   [2]  Social History     Tobacco Use     Smoking status: Former     Types: Cigarettes     Quit date: 1985     Years since quittin.1     Smokeless tobacco: Never   Vaping Use     Vaping Use: Never used   Substance Use Topics     Alcohol use: Yes     Alcohol/week: 14.0 standard drinks     Types: 7 Cans of beer, 7 Shots of liquor per week     Comment: 2 daily     Drug use: No        Medications:    meclizine (ANTIVERT) 12.5 MG tablet  levothyroxine (SYNTHROID/LEVOTHROID) 50 MCG tablet          Review of Systems   Constitutional: Negative for chills and fever.   HENT: Negative.    Eyes: Negative.  Negative for photophobia and visual disturbance.   Respiratory: Negative.    Cardiovascular: Negative.         Patient states that he has a history for slow heart rate and has been like this all his life.   Gastrointestinal: Positive for nausea. Negative for vomiting.   Endocrine: Negative for polydipsia, polyphagia and polyuria.   Genitourinary: Negative.    Musculoskeletal: Negative.    Neurological: Positive for dizziness and headaches. Negative for syncope, speech difficulty, weakness and numbness.   Psychiatric/Behavioral: Negative.    All other systems reviewed and are negative.      Physical Exam   BP: (!) 168/98  Pulse: 61  Temp: 97.1  F (36.2  C)  Resp: 18  Height: 180.3 cm (5' 11\")  Weight: 104.1 kg (229 lb 9.6 oz)  SpO2: 100 %      Physical Exam  Vitals reviewed. Exam conducted with a chaperone present (Spouse).   Constitutional:       General: He is not in acute distress.  HENT:      Head: Normocephalic and atraumatic.      Right Ear: Tympanic membrane " and ear canal normal.      Left Ear: Tympanic membrane and ear canal normal.      Nose: Nose normal.      Mouth/Throat:      Mouth: Mucous membranes are moist.   Eyes:      Extraocular Movements: Extraocular movements intact.      Conjunctiva/sclera: Conjunctivae normal.      Pupils: Pupils are equal, round, and reactive to light.      Comments: No significant nystagmus noted on exam.   Neck:      Vascular: No carotid bruit.   Cardiovascular:      Rate and Rhythm: Bradycardia present.      Pulses: Normal pulses.      Heart sounds: No murmur heard.  Pulmonary:      Effort: Pulmonary effort is normal. No respiratory distress.   Abdominal:      Tenderness: There is no guarding.   Musculoskeletal:         General: Normal range of motion.      Cervical back: Normal range of motion and neck supple.   Skin:     Capillary Refill: Capillary refill takes less than 2 seconds.      Coloration: Skin is not jaundiced.      Findings: No bruising or rash.   Neurological:      Mental Status: He is alert and oriented to person, place, and time.   Psychiatric:         Mood and Affect: Mood normal.         Behavior: Behavior normal.         ED Course                 Procedures              EKG Interpretation:      Interpreted by Enmanuel Merida DO  Time reviewed: 21:20  Symptoms at time of EKG: Dizziness   Rhythm: normal sinus   Rate: 50  Axis: normal  Ectopy: none  Conduction: normal  ST Segments/ T Waves: No ST-T wave changes  Q Waves: none  Comparison to prior: Unchanged    Clinical Impression: Sinus kojo, normal EKG          Critical Care time:  none               Results for orders placed or performed during the hospital encounter of 03/03/23 (from the past 24 hour(s))   CBC with platelets differential    Narrative    The following orders were created for panel order CBC with platelets differential.  Procedure                               Abnormality         Status                     ---------                                -----------         ------                     CBC with platelets and d...[[id number]]                      Final result                 Please view results for these tests on the individual orders.   Basic metabolic panel   Result Value Ref Range    Sodium 134 (L) 136 - 145 mmol/L    Potassium 3.9 3.4 - 5.3 mmol/L    Chloride 99 98 - 107 mmol/L    Carbon Dioxide (CO2) 24 22 - 29 mmol/L    Anion Gap 11 7 - 15 mmol/L    Urea Nitrogen 13.8 8.0 - 23.0 mg/dL    Creatinine 0.87 0.67 - 1.17 mg/dL    Calcium 9.3 8.8 - 10.2 mg/dL    Glucose 136 (H) 70 - 99 mg/dL    GFR Estimate >90 >60 mL/min/1.73m2   CBC with platelets and differential   Result Value Ref Range    WBC Count 9.7 4.0 - 11.0 10e3/uL    RBC Count 4.79 4.40 - 5.90 10e6/uL    Hemoglobin 15.8 13.3 - 17.7 g/dL    Hematocrit 44.9 40.0 - 53.0 %    MCV 94 78 - 100 fL    MCH 33.0 26.5 - 33.0 pg    MCHC 35.2 31.5 - 36.5 g/dL    RDW 12.3 10.0 - 15.0 %    Platelet Count 168 150 - 450 10e3/uL    % Neutrophils 65 %    % Lymphocytes 24 %    % Monocytes 8 %    % Eosinophils 2 %    % Basophils 1 %    % Immature Granulocytes 0 %    NRBCs per 100 WBC 0 <1 /100    Absolute Neutrophils 6.4 1.6 - 8.3 10e3/uL    Absolute Lymphocytes 2.4 0.8 - 5.3 10e3/uL    Absolute Monocytes 0.7 0.0 - 1.3 10e3/uL    Absolute Eosinophils 0.2 0.0 - 0.7 10e3/uL    Absolute Basophils 0.1 0.0 - 0.2 10e3/uL    Absolute Immature Granulocytes 0.0 <=0.4 10e3/uL    Absolute NRBCs 0.0 10e3/uL   CT Head w/o Contrast    Narrative    EXAM: CTA HEAD NECK W CONTRAST, CT HEAD W/O CONTRAST  LOCATION: [facility]  DATE/TIME: 3/3/2023 11:03 PM    INDICATION: Cute onset of headache followed by ataxia and dizziness.  COMPARISON: None.  CONTRAST: 80 mL Isovue-370  TECHNIQUE: Head and neck CT angiogram with IV contrast. Noncontrast head CT followed by axial helical CT images of the head and neck vessels obtained during the arterial phase of intravenous contrast administration. Axial 2D  reconstructed images and   multiplanar 3D MIP reconstructed images of the head and neck vessels were performed by the technologist. Dose reduction techniques were used. All stenosis measurements made according to NASCET criteria unless otherwise specified.    FINDINGS:   NONCONTRAST HEAD CT:   INTRACRANIAL CONTENTS: No intracranial hemorrhage, extraaxial collection, or mass effect.  No CT evidence of acute infarct. Mild presumed chronic small vessel ischemic changes. Mild generalized volume loss. No hydrocephalus.     VISUALIZED ORBITS/SINUSES/MASTOIDS: No intraorbital abnormality. No paranasal sinus mucosal disease. No middle ear or mastoid effusion.    BONES/SOFT TISSUES: No acute abnormality.    HEAD CTA:  ANTERIOR CIRCULATION: No stenosis/occlusion, aneurysm, or high flow vascular malformation. Standard [place] anatomy.    POSTERIOR CIRCULATION: No stenosis/occlusion, aneurysm, or high flow vascular malformation. Dominant left vertebral artery supplies the basilar artery with a small right vertebral artery supplying the right posterior inferior cerebellar artery (PICA).     DURAL VENOUS SINUSES: Expected enhancement of the major dural venous sinuses.    NECK CTA:  RIGHT CAROTID: Minimal atheromatous change. No measurable stenosis or dissection.    LEFT CAROTID: Minimal atheromatous change. No measurable stenosis or dissection.    VERTEBRAL ARTERIES: No flow-limiting stenosis or dissection identified. Dominant left and smaller right vertebral arteries.    AORTIC ARCH: Classic aortic arch anatomy with no significant stenosis at the origin of the great vessels.    NONVASCULAR STRUCTURES: Unremarkable.      Impression    IMPRESSION:   HEAD CT:  1.  No CT evidence of acute intracranial hemorrhage, mass or recent transcortical infarct.  2.  Mild volume loss and presumed chronic small vessel ischemic changes.    HEAD CTA:   1.  Normal CTA [place].    NECK CTA:  1.  No hemodynamically significant  stenosis or dissection in the major vessels of the neck.   CTA Head Neck with Contrast    Narrative    EXAM: CTA HEAD NECK W CONTRAST, CT HEAD W/O CONTRAST  LOCATION: [facility]  DATE/TIME: 3/3/2023 11:03 PM    INDICATION: Cute onset of headache followed by ataxia and dizziness.  COMPARISON: None.  CONTRAST: 80 mL Isovue-370  TECHNIQUE: Head and neck CT angiogram with IV contrast. Noncontrast head CT followed by axial helical CT images of the head and neck vessels obtained during the arterial phase of intravenous contrast administration. Axial 2D reconstructed images and   multiplanar 3D MIP reconstructed images of the head and neck vessels were performed by the technologist. Dose reduction techniques were used. All stenosis measurements made according to NASCET criteria unless otherwise specified.    FINDINGS:   NONCONTRAST HEAD CT:   INTRACRANIAL CONTENTS: No intracranial hemorrhage, extraaxial collection, or mass effect.  No CT evidence of acute infarct. Mild presumed chronic small vessel ischemic changes. Mild generalized volume loss. No hydrocephalus.     VISUALIZED ORBITS/SINUSES/MASTOIDS: No intraorbital abnormality. No paranasal sinus mucosal disease. No middle ear or mastoid effusion.    BONES/SOFT TISSUES: No acute abnormality.    HEAD CTA:  ANTERIOR CIRCULATION: No stenosis/occlusion, aneurysm, or high flow vascular malformation. Standard [place] anatomy.    POSTERIOR CIRCULATION: No stenosis/occlusion, aneurysm, or high flow vascular malformation. Dominant left vertebral artery supplies the basilar artery with a small right vertebral artery supplying the right posterior inferior cerebellar artery (PICA).     DURAL VENOUS SINUSES: Expected enhancement of the major dural venous sinuses.    NECK CTA:  RIGHT CAROTID: Minimal atheromatous change. No measurable stenosis or dissection.    LEFT CAROTID: Minimal atheromatous change. No measurable stenosis or  dissection.    VERTEBRAL ARTERIES: No flow-limiting stenosis or dissection identified. Dominant left and smaller right vertebral arteries.    AORTIC ARCH: Classic aortic arch anatomy with no significant stenosis at the origin of the great vessels.    NONVASCULAR STRUCTURES: Unremarkable.      Impression    IMPRESSION:   HEAD CT:  1.  No CT evidence of acute intracranial hemorrhage, mass or recent transcortical infarct.  2.  Mild volume loss and presumed chronic small vessel ischemic changes.    HEAD CTA:   1.  Normal CTA [place].    NECK CTA:  1.  No hemodynamically significant stenosis or dissection in the major vessels of the neck.       Medications   ondansetron (ZOFRAN ODT) ODT tab 4 mg (4 mg Oral $Given 3/3/23 2130)   sodium chloride 0.9 % bag 100 mL for CT scan flush use (100 mLs Intravenous $Given 3/3/23 2237)   iopamidol (ISOVUE-370) solution 200 mL (80 mLs Intravenous $Given 3/3/23 2237)       Assessments & Plan (with Medical Decision Making)  Patient's vertiginous symptoms resolved during the ER stay.  Evaluation suggestive of a positional vertigo with initial exam.  Patient has had symptoms on and off for years but it was more severe today.  Patient was given verbal as well as written instructions discussing positional vertigo.  He was encouraged to follow-up with his clinic physician for recheck.  He was also prescribed Antivert to use as needed for vertiginous symptoms.  To return to the ER for any increase or worsening symptoms as needed.  He was discharged in the care of his family.     I have reviewed the nursing notes.    I have reviewed the findings, diagnosis, plan and need for follow up with the patient.           Medical Decision Making  The patient's presentation was of moderate complexity (a chronic illness mild to moderate exacerbation, progression, or side effect of treatment).    The patient's evaluation involved:  ordering and/or review of 3+ test(s) in this encounter (see  separate area of note for details)    The patient's management necessitated moderate risk (prescription drug management including medications given in the ED).        Discharge Medication List as of 3/3/2023 11:46 PM      START taking these medications    Details   meclizine (ANTIVERT) 12.5 MG tablet Take 1 tablet (12.5 mg) by mouth 4 times daily as needed for dizziness, Disp-30 tablet, R-0, E-Prescribe                  I verbally discussed the findings of the evaluation today in the ER. I have verbally discussed with Miguelangel the suggested treatment(s) as described in the discharge instructions and handouts. I have prescribed the above listed medications and instructed him on appropriate use of these medications.      I have verbally suggested he follow-up in his clinic or return to the ER for increased symptoms. See the follow-up recommendations documented  in the after visit summary in this visit's EPIC chart.      Disclaimer: This note consists of words and symbols derived from keyboarding and dictation using voice recognition software.  As a result, there may be errors that have gone undetected.  Please consider this when interpreting information found in this note.    Final diagnoses:   Benign paroxysmal positional vertigo, unspecified laterality       3/3/2023   [facility] EMERGENCY DEPT     Enmanuel Merida,   03/05/23 0613

## 2023-08-07 ENCOUNTER — TRANSFERRED RECORDS (OUTPATIENT)
Dept: HEALTH INFORMATION MANAGEMENT | Facility: CLINIC | Age: 73
End: 2023-08-07

## 2023-08-07 ENCOUNTER — OFFICE VISIT (OUTPATIENT)
Dept: FAMILY MEDICINE | Facility: CLINIC | Age: 73
End: 2023-08-07
Payer: COMMERCIAL

## 2023-08-07 VITALS
WEIGHT: 222 LBS | SYSTOLIC BLOOD PRESSURE: 122 MMHG | OXYGEN SATURATION: 96 % | TEMPERATURE: 97.7 F | DIASTOLIC BLOOD PRESSURE: 82 MMHG | HEART RATE: 63 BPM | BODY MASS INDEX: 31.08 KG/M2 | HEIGHT: 71 IN

## 2023-08-07 DIAGNOSIS — Z11.59 NEED FOR HEPATITIS C SCREENING TEST: ICD-10-CM

## 2023-08-07 DIAGNOSIS — Z01.818 PREOP GENERAL PHYSICAL EXAM: ICD-10-CM

## 2023-08-07 DIAGNOSIS — E03.9 HYPOTHYROIDISM, UNSPECIFIED TYPE: ICD-10-CM

## 2023-08-07 DIAGNOSIS — M17.12 ARTHRITIS OF LEFT KNEE: ICD-10-CM

## 2023-08-07 DIAGNOSIS — Z01.818 PRE-OPERATIVE EXAMINATION: Primary | ICD-10-CM

## 2023-08-07 PROBLEM — M75.21 BICEPS TENDONITIS, RIGHT: Status: RESOLVED | Noted: 2017-10-25 | Resolved: 2023-08-07

## 2023-08-07 PROBLEM — M16.11 PRIMARY OSTEOARTHRITIS OF RIGHT HIP: Status: RESOLVED | Noted: 2018-01-22 | Resolved: 2023-08-07

## 2023-08-07 PROBLEM — M75.121 COMPLETE TEAR OF RIGHT ROTATOR CUFF: Status: RESOLVED | Noted: 2017-10-25 | Resolved: 2023-08-07

## 2023-08-07 PROBLEM — E05.90 HYPERTHYROIDISM: Status: ACTIVE | Noted: 2023-08-07

## 2023-08-07 PROBLEM — M19.019 AC JOINT ARTHROPATHY: Status: RESOLVED | Noted: 2017-10-25 | Resolved: 2023-08-07

## 2023-08-07 PROBLEM — M24.811 INTERNAL DERANGEMENT OF RIGHT SHOULDER: Status: RESOLVED | Noted: 2020-01-16 | Resolved: 2023-08-07

## 2023-08-07 LAB
ALBUMIN UR-MCNC: NEGATIVE MG/DL
ANION GAP SERPL CALCULATED.3IONS-SCNC: 9 MMOL/L (ref 7–15)
APPEARANCE UR: CLEAR
BILIRUB UR QL STRIP: NEGATIVE
BUN SERPL-MCNC: 12.6 MG/DL (ref 8–23)
CALCIUM SERPL-MCNC: 9 MG/DL (ref 8.8–10.2)
CHLORIDE SERPL-SCNC: 103 MMOL/L (ref 98–107)
COLOR UR AUTO: YELLOW
CREAT SERPL-MCNC: 1.07 MG/DL (ref 0.67–1.17)
DEPRECATED HCO3 PLAS-SCNC: 25 MMOL/L (ref 22–29)
ERYTHROCYTE [DISTWIDTH] IN BLOOD BY AUTOMATED COUNT: 12.4 % (ref 10–15)
GFR SERPL CREATININE-BSD FRML MDRD: 74 ML/MIN/1.73M2
GLUCOSE SERPL-MCNC: 125 MG/DL (ref 70–99)
GLUCOSE UR STRIP-MCNC: NEGATIVE MG/DL
HCT VFR BLD AUTO: 45.5 % (ref 40–53)
HGB BLD-MCNC: 16.3 G/DL (ref 13.3–17.7)
HGB UR QL STRIP: NEGATIVE
KETONES UR STRIP-MCNC: NEGATIVE MG/DL
LEUKOCYTE ESTERASE UR QL STRIP: NEGATIVE
MCH RBC QN AUTO: 33.6 PG (ref 26.5–33)
MCHC RBC AUTO-ENTMCNC: 35.8 G/DL (ref 31.5–36.5)
MCV RBC AUTO: 94 FL (ref 78–100)
NITRATE UR QL: NEGATIVE
PH UR STRIP: 7 [PH] (ref 5–7)
PLATELET # BLD AUTO: 174 10E3/UL (ref 150–450)
POTASSIUM SERPL-SCNC: 4.3 MMOL/L (ref 3.4–5.3)
RBC # BLD AUTO: 4.85 10E6/UL (ref 4.4–5.9)
SODIUM SERPL-SCNC: 137 MMOL/L (ref 136–145)
SP GR UR STRIP: 1.01 (ref 1–1.03)
T4 FREE SERPL-MCNC: 0.84 NG/DL (ref 0.9–1.7)
TSH SERPL DL<=0.005 MIU/L-ACNC: 8.3 UIU/ML (ref 0.3–4.2)
UROBILINOGEN UR STRIP-MCNC: NORMAL MG/DL
WBC # BLD AUTO: 9.2 10E3/UL (ref 4–11)

## 2023-08-07 PROCEDURE — 93000 ELECTROCARDIOGRAM COMPLETE: CPT | Performed by: FAMILY MEDICINE

## 2023-08-07 PROCEDURE — 84439 ASSAY OF FREE THYROXINE: CPT | Performed by: FAMILY MEDICINE

## 2023-08-07 PROCEDURE — 84443 ASSAY THYROID STIM HORMONE: CPT | Performed by: FAMILY MEDICINE

## 2023-08-07 PROCEDURE — 86803 HEPATITIS C AB TEST: CPT | Performed by: FAMILY MEDICINE

## 2023-08-07 PROCEDURE — 80048 BASIC METABOLIC PNL TOTAL CA: CPT | Performed by: FAMILY MEDICINE

## 2023-08-07 PROCEDURE — 87522 HEPATITIS C REVRS TRNSCRPJ: CPT | Performed by: FAMILY MEDICINE

## 2023-08-07 PROCEDURE — 99214 OFFICE O/P EST MOD 30 MIN: CPT | Performed by: FAMILY MEDICINE

## 2023-08-07 PROCEDURE — 85027 COMPLETE CBC AUTOMATED: CPT | Performed by: FAMILY MEDICINE

## 2023-08-07 PROCEDURE — 81003 URINALYSIS AUTO W/O SCOPE: CPT | Performed by: FAMILY MEDICINE

## 2023-08-07 PROCEDURE — 36415 COLL VENOUS BLD VENIPUNCTURE: CPT | Performed by: FAMILY MEDICINE

## 2023-08-07 ASSESSMENT — PAIN SCALES - GENERAL: PAINLEVEL: NO PAIN (0)

## 2023-08-07 NOTE — PROGRESS NOTES
[address] [address]  Phone: [phone]  Fax: [phone]  Primary Provider: Gregory Horowitz  Pre-op Performing Provider: PHYLLIS MONSALVE      PREOPERATIVE EVALUATION:  Today's date: 8/7/2023    Miguelangel Graham is a 73 year old male who presents for a preoperative evaluation.      8/7/2023     8:14 AM   Additional Questions   Roomed by Jade DUMAS       Surgical Information:  Surgery/Procedure: left total knee replacement  Surgery Location: [facility]  Surgeon: Gume Johnson   Surgery Date: 8/22/23  Time of Surgery: 7:30 AM  Where patient plans to recover: At home with family  Fax number for surgical facility: [phone]    Assessment & Plan     The proposed surgical procedure is considered INTERMEDIATE risk.      ICD-10-CM    1. Pre-operative examination  Z01.818 EKG 12-lead complete w/read - Clinics     Basic metabolic panel  (Ca, Cl, CO2, Creat, Gluc, K, Na, BUN)     CBC with platelets     UA Macroscopic with reflex to Microscopic and Culture - Lab Collect     Basic metabolic panel  (Ca, Cl, CO2, Creat, Gluc, K, Na, BUN)     CBC with platelets     UA Macroscopic with reflex to Microscopic and Culture - Lab Collect      2. Arthritis of left knee  M17.12       3. Hypothyroidism, unspecified type  E03.9 TSH WITH FREE T4 REFLEX     TSH WITH FREE T4 REFLEX     T4 free      4. Need for hepatitis C screening test  Z11.59 Hepatitis C Screen Reflex to HCV RNA Quant and Genotype     Hepatitis C Screen Reflex to HCV RNA Quant and Genotype     Hepatitis C RNA, Quantitative by PCR with Confirmatory Reflex to Genotyping           He is scheduled for left knee total replacement on 8/22/2023.  Labs as ordered as per surgeon's request.  Lab result reviewed, no concern finding identified.    His TSH level was slightly high with a low free T3 level - consistent with hypothyroidism.  Will restart wreath levothyroxine at 25 mg daily.  Recheck the level in 3  months.      Possible Sleep Apnea:        8/11/2023    11:16 PM   STOP-Bang Total Score   Total Score 1   Risk Stratification 0 - 2: Low Risk for PA          Risks and Recommendations:  The patient has the following additional risks and recommendations for perioperative complications:    Cardiovascular:  No cardiovascular risks identified.  No history of CAD, CVA, HTN, or DM.  Today's EKG showed normal sinus rhythm.  No no further cardiovascular work-up is needed for this procedure.    Diabetes:  He does not have diabetes.    Pulmonary:   No pulmonary risk identified.  Quitted smoking since 1985.  No history of asthma or COPD.   - Oxygen as needed to maintain O2 sat above 94% during and after procedure.    Obstructive Sleep Apnea:   No history of sleep apnea.  No risk for sleep apnea identified.    Anemia/Bleeding/Clotting:   No history of bleeding disorder.  No history of PE/DVT.  PE/DVT prophylaxis per surgeon's recommendation/desire.   No history of anemia or blood transfusion and is okay to be transfused if necessary.     Social and Substance:   No social or substance concerns identified.  No mental health concerns.  Not on chronic pain meds.  No drugs or alcohol.  Quitted smoking since 1985.     Infection:   No history of MRSA  Prophylactic antibiotics per surgeon's recommendation/desire.  Shower with provided antimicrobial shampoo the day before and a.m. the procedure as instructed    Anesthesia  No anesthesia risk or contraindication identified.      Additional Medication Instructions:  Please take your prescribed medications as prescribed except.  No Aspirin or NSAID (Ibuprofen, Aleve, Motrin, Naproxen, ect.) for 7 days before the procedure  Take shower with provided shampoo the day before and am of procedure  No alcohol or smoking for 24 before the procedure  Nothing to eat for 8 hrs before the procedure.      RECOMMENDATION:  APPROVAL GIVEN to proceed with proposed procedure, without further diagnostic  evaluation.        Subjective       HPI related to upcoming procedure: Left knee total replacement        8/7/2023     8:05 AM   Preop Questions   1. Have you ever had a heart attack or stroke? No   2. Have you ever had surgery on your heart or blood vessels, such as a stent placement, a coronary artery bypass, or surgery on an artery in your head, neck, heart, or legs? No   3. Do you have chest pain with activity? No   4. Do you have a history of  heart failure? No   5. Do you currently have a cold, bronchitis or symptoms of other infection? No   6. Do you have a cough, shortness of breath, or wheezing? No   7. Do you or anyone in your family have previous history of blood clots? No   8. Do you or does anyone in your family have a serious bleeding problem such as prolonged bleeding following surgeries or cuts? No   9. Have you ever had problems with anemia or been told to take iron pills? No   10. Have you had any abnormal blood loss such as black, tarry or bloody stools? No   11. Have you ever had a blood transfusion? No   12. Are you willing to have a blood transfusion if it is medically needed before, during, or after your surgery? Yes   13. Have you or any of your relatives ever had problems with anesthesia? No   14. Do you have sleep apnea, excessive snoring or daytime drowsiness? YES -  not sure   14a. Do you have a CPAP machine? No   15. Do you have any artifical heart valves or other implanted medical devices like a pacemaker, defibrillator, or continuous glucose monitor? No   16. Do you have artificial joints? YES - shoulder replacement   17. Are you allergic to latex? No       Health Care Directive:  Patient does not have a Health Care Directive or Living Will: Discussed advance care planning with patient; information given to patient to review.    Preoperative Review of :   reviewed - no record of controlled substances prescribed.      Miguelangel is here today for pre-op physical. He is scheduled for  left knee replacement on 8/22/2023 at [facility] for chronic knee pain with severe arthritis.  Failed conservative management. It is planned for general anesthesia and overnight inpatient recovery. Not sure how long the procedure will take.  No personal or family history of anesthesia complication or pre-matured CAD or MI.  Has not been on steroid orally in the last 6 months.  Not taking Aspirin or other form of blood thinner.  Last dose of NSAID was over a week ago.      Generally is healthy -not taking the medication chronically.     He generally is doing well and has no concern today. No headache or dizziness. No runny nose, nasal congestion, ST, coughing, fever or chill.  No chest pain or SOB.  No N/V/D/C or problem with urination.  Quitted smoking since 1985 and denied of having breathing problem.  No history of asthma or COPD.     Review of Systems  Constitutional, neuro, ENT, endocrine, pulmonary, cardiac, gastrointestinal, genitourinary, musculoskeletal, integument and psychiatric systems are negative, except as otherwise noted.    Patient Active Problem List    Diagnosis Date Noted    Internal derangement of right shoulder 01/16/2020     Priority: Medium    Kidney stone 02/07/2019     Priority: Medium    S/P rotator cuff repair 03/15/2018     Priority: Medium    Primary osteoarthritis of right hip 01/22/2018     Priority: Medium    AC joint arthropathy 10/25/2017     Priority: Medium    Biceps tendonitis, right 10/25/2017     Priority: Medium    Complete tear of right rotator cuff 10/25/2017     Priority: Medium    CARDIOVASCULAR SCREENING; LDL GOAL LESS THAN 160 10/31/2010     Priority: Medium      Past Medical History:   Diagnosis Date    PONV (postoperative nausea and vomiting) 12/12/2017    shoulder surgery     Past Surgical History:   Procedure Laterality Date    ARTHROSCOPY SHOULDER BICEPS TENODESIS REPAIR Right 12/12/2017    Procedure: ARTHROSCOPY SHOULDER BICEPS TENODESIS REPAIR;;  Surgeon:  Jose Jones DO;  Location: [facility]    ARTHROSCOPY SHOULDER DECOMPRESSION Right 12/12/2017    Procedure: ARTHROSCOPY SHOULDER DECOMPRESSION;;  Surgeon: Jose Jones DO;  Location: [facility]    ARTHROSCOPY SHOULDER DISTAL CLAVICLE REPAIR Right 12/12/2017    Procedure: ARTHROSCOPY SHOULDER DISTAL CLAVICLE RESECTION;;  Surgeon: Jose Jones DO;  Location: [facility]    ARTHROSCOPY SHOULDER, OPEN ROTATOR CUFF REPAIR, COMBINED Right 12/12/2017    Procedure: COMBINED ARTHROSCOPY SHOULDER, OPEN ROTATOR CUFF REPAIR;  right shoulder arthroscopy with open mini rotator cuff repair, biceps tenodesis, distal clavicle and subacromial decompression with partial acromioplasty and removal of foreing body;  Surgeon: Jose Jones DO;  Location: [facility]    COLONOSCOPY N/A 1/19/2022    Procedure: COLONOSCOPY, WITH POLYPECTOMY;  Surgeon: John Mathis MD;  Location:  GI    HC KNEE SCOPE, DIAGNOSTIC      Arthroscopy, Knee    HC REPAIR ROTATOR CUFF,CHRONIC      PHACOEMULSIFICATION WITH STANDARD INTRAOCULAR LENS IMPLANT Right 10/17/2019    Procedure: PHACOEMULSIFICATION, CATARACT Right WITH STANDARD IOL INSERTION;  Surgeon: Jf Montiel MD;  Location: [facility]    PHACOEMULSIFICATION WITH STANDARD INTRAOCULAR LENS IMPLANT Left 10/31/2019    Procedure: PHACOEMULSIFICATION, CATARACT Left eye WITH STANDARD IOL INSERTION;  Surgeon: Jf Montiel MD;  Location: [facility]    REMOVE FOREIGN BODY UPPER EXTREMITY Right 12/12/2017    Procedure: REMOVE FOREIGN BODY UPPER EXTREMITY;;  Surgeon: Jose Jones DO;  Location: [facility]     Current Outpatient Medications   Medication Sig Dispense Refill    levothyroxine (SYNTHROID/LEVOTHROID) 50 MCG tablet Take 1 tablet (50 mcg) by mouth daily 90 tablet 3    meclizine (ANTIVERT) 12.5 MG tablet Take 1 tablet (12.5 mg) by mouth 4 times daily as needed for dizziness 30 tablet 0       Allergies   Allergen Reactions    No Known Drug Allergy      "    Social History     Tobacco Use    Smoking status: Former     Types: Cigarettes     Quit date: 1985     Years since quittin.6    Smokeless tobacco: Never   Substance Use Topics    Alcohol use: Yes     Alcohol/week: 14.0 standard drinks of alcohol     Types: 7 Cans of beer, 7 Shots of liquor per week     Comment: 2 daily     Family History   Problem Relation Age of Onset    Cancer Mother          of cancer - pancreatic cancer    Alzheimer Disease Father     Alzheimer Disease Sister     Diabetes Sister     Alzheimer Disease Sister     Diabetes Sister     Alzheimer Disease Sister     Unknown/Adopted Sister     No Known Problems Sister     Coronary Artery Disease Sister          of CHF    Coronary Artery Disease Brother          of MI    Alzheimer Disease Brother     Diabetes Brother     Diabetes Brother     Diabetes Brother     Diabetes Brother     Coronary Artery Disease Brother     Diabetes Brother     No Known Problems Daughter     No Known Problems Son      History   Drug Use No         Objective     /82   Pulse 63   Temp 97.7  F (36.5  C) (Temporal)   Ht 1.803 m (5' 11\")   Wt 100.7 kg (222 lb)   SpO2 96%   BMI 30.96 kg/m      Physical Exam    GENERAL APPEARANCE: healthy, alert and no distress, speaking in full sentences     EYES: EOMI,  PERRL     HENT: ear canals and TM's normal and nose and mouth without ulcers or lesions.  Nares are non-congested. Oropharynx is pink and moist. No tender with palpation to the sinuses.      NECK: no adenopathy, no asymmetry, masses, or scars and thyroid normal to palpation     RESP: lungs clear to auscultation - no rales, rhonchi or wheezes     CV: regular rates and rhythm, normal S1 S2, no S3 or S4 and no murmur     ABDOMEN:  soft, nontender, no HSM or masses and bowel sounds normal     MS: extremities normal- no gross deformities noted, no focal weakness.     SKIN: no suspicious lesions or rashes.  No petechia or ecchymosis.     NEURO: Normal " strength and tone, sensory exam grossly normal, mentation intact and speech normal.  No focal neurological deficit.     PSYCH: mentation appears normal. and affect normal/bright     LYMPHATICS: No cervical adenopathy    Recent Labs   Lab Test 03/03/23  2137 12/03/21  0936   HGB 15.8 15.5    183   * 138   POTASSIUM 3.9 3.9   CR 0.87 0.81        Diagnostics:  Recent Results (from the past 240 hour(s))   Hepatitis C Screen Reflex to HCV RNA Quant and Genotype    Collection Time: 08/07/23  1:22 PM   Result Value Ref Range    Hepatitis C Antibody Reactive (A) Nonreactive   TSH WITH FREE T4 REFLEX    Collection Time: 08/07/23  1:22 PM   Result Value Ref Range    TSH 8.30 (H) 0.30 - 4.20 uIU/mL   Basic metabolic panel  (Ca, Cl, CO2, Creat, Gluc, K, Na, BUN)    Collection Time: 08/07/23  1:22 PM   Result Value Ref Range    Sodium 137 136 - 145 mmol/L    Potassium 4.3 3.4 - 5.3 mmol/L    Chloride 103 98 - 107 mmol/L    Carbon Dioxide (CO2) 25 22 - 29 mmol/L    Anion Gap 9 7 - 15 mmol/L    Urea Nitrogen 12.6 8.0 - 23.0 mg/dL    Creatinine 1.07 0.67 - 1.17 mg/dL    Calcium 9.0 8.8 - 10.2 mg/dL    Glucose 125 (H) 70 - 99 mg/dL    GFR Estimate 74 >60 mL/min/1.73m2   CBC with platelets    Collection Time: 08/07/23  1:22 PM   Result Value Ref Range    WBC Count 9.2 4.0 - 11.0 10e3/uL    RBC Count 4.85 4.40 - 5.90 10e6/uL    Hemoglobin 16.3 13.3 - 17.7 g/dL    Hematocrit 45.5 40.0 - 53.0 %    MCV 94 78 - 100 fL    MCH 33.6 (H) 26.5 - 33.0 pg    MCHC 35.8 31.5 - 36.5 g/dL    RDW 12.4 10.0 - 15.0 %    Platelet Count 174 150 - 450 10e3/uL   T4 free    Collection Time: 08/07/23  1:22 PM   Result Value Ref Range    Free T4 0.84 (L) 0.90 - 1.70 ng/dL   Hepatitis C RNA, Quantitative by PCR with Confirmatory Reflex to Genotyping    Collection Time: 08/07/23  1:22 PM   Result Value Ref Range    Hepatitis C RNA IU/mL Not Detected Not Detected IU/mL   UA Macroscopic with reflex to Microscopic and Culture - Lab Collect     Collection Time: 08/07/23  1:25 PM    Specimen: Urine, NOS   Result Value Ref Range    Color Urine Yellow Colorless, Straw, Light Yellow, Yellow    Appearance Urine Clear Clear    Glucose Urine Negative Negative mg/dL    Bilirubin Urine Negative Negative    Ketones Urine Negative Negative mg/dL    Specific Gravity Urine 1.015 1.003 - 1.035    Blood Urine Negative Negative    pH Urine 7.0 5.0 - 7.0    Protein Albumin Urine Negative Negative mg/dL    Urobilinogen Urine Normal Normal, 2.0 mg/dL    Nitrite Urine Negative Negative    Leukocyte Esterase Urine Negative Negative        EKG: Normal Sinus Rhythm, normal axis, normal intervals, no acute ST/T changes c/w ischemia, no LVH by voltage criteria    Revised Cardiac Risk Index (RCRI):  The patient has the following serious cardiovascular risks for perioperative complications:   - No serious cardiac risks = 0 points     RCRI Interpretation: 0 points: Class I (very low risk - 0.4% complication rate)         Signed Electronically by: Yumiko Denton Mai, MD  Copy of this evaluation report is provided to requesting physician.

## 2023-08-07 NOTE — LETTER
August 14, 2023      Miguelangel Graham  [address] [address]        Dear ,    We are writing to inform you of your test results.    Your screening test for hepatitis C was negative.    Resulted Orders   Hepatitis C Screen Reflex to HCV RNA Quant and Genotype   Result Value Ref Range    Hepatitis C Antibody Reactive (A) Nonreactive      Comment:      A reactive result indicates one of the following   1) Current HCV infection   2) Past HCV infection that has resolved or   3) False positivity.     The CDC recommends that a reactive result should be followed by Nucleic acid testing for HCV RNA. If HCV RNA is detected, that indicates current HCV infection.   If HCV RNA is not detected, that indicates either past, resolved HCV infection, or false HCV antibody positivity.    Narrative    Assay performance characteristics have not been established for newborns, infants, and children.   TSH WITH FREE T4 REFLEX   Result Value Ref Range    TSH 8.30 (H) 0.30 - 4.20 uIU/mL   Basic metabolic panel  (Ca, Cl, CO2, Creat, Gluc, K, Na, BUN)   Result Value Ref Range    Sodium 137 136 - 145 mmol/L    Potassium 4.3 3.4 - 5.3 mmol/L    Chloride 103 98 - 107 mmol/L    Carbon Dioxide (CO2) 25 22 - 29 mmol/L    Anion Gap 9 7 - 15 mmol/L    Urea Nitrogen 12.6 8.0 - 23.0 mg/dL    Creatinine 1.07 0.67 - 1.17 mg/dL    Calcium 9.0 8.8 - 10.2 mg/dL    Glucose 125 (H) 70 - 99 mg/dL    GFR Estimate 74 >60 mL/min/1.73m2   CBC with platelets   Result Value Ref Range    WBC Count 9.2 4.0 - 11.0 10e3/uL    RBC Count 4.85 4.40 - 5.90 10e6/uL    Hemoglobin 16.3 13.3 - 17.7 g/dL    Hematocrit 45.5 40.0 - 53.0 %    MCV 94 78 - 100 fL    MCH 33.6 (H) 26.5 - 33.0 pg    MCHC 35.8 31.5 - 36.5 g/dL    RDW 12.4 10.0 - 15.0 %    Platelet Count 174 150 - 450 10e3/uL   UA Macroscopic with reflex to Microscopic and Culture - Lab Collect   Result Value Ref Range    Color Urine Yellow Colorless, Straw, Light Yellow, Yellow    Appearance Urine  Clear Clear    Glucose Urine Negative Negative mg/dL    Bilirubin Urine Negative Negative    Ketones Urine Negative Negative mg/dL    Specific Gravity Urine 1.015 1.003 - 1.035    Blood Urine Negative Negative    pH Urine 7.0 5.0 - 7.0    Protein Albumin Urine Negative Negative mg/dL    Urobilinogen Urine Normal Normal, 2.0 mg/dL    Nitrite Urine Negative Negative    Leukocyte Esterase Urine Negative Negative    Narrative    Microscopic not indicated   T4 free   Result Value Ref Range    Free T4 0.84 (L) 0.90 - 1.70 ng/dL   Hepatitis C RNA, Quantitative by PCR with Confirmatory Reflex to Genotyping   Result Value Ref Range    Hepatitis C RNA IU/mL Not Detected Not Detected IU/mL    Narrative    The jair  Hepatitis C assay is an FDA-approved in vitro nucleic acid amplification test for the quantification of Hepatitis C virus (HCV) RNA in human EDTA plasma or serum, using the Roche jair  6800 instrument for automated viral nucleic acid extraction, purification, amplification, and detection of the viral nucleic acid target. This assay utilizes dual probes to detect and quantify, but not discriminate genotypes 1-6. The test is intended for use as an aid in the diagnosis of HCV infection and an aid in the management of HCV-infected patients undergoing anti-viral therapy. Titer results are reported in IU/mL.       If you have any questions or concerns, please call the clinic at the number listed above.       Sincerely,      Yumiko Denton Mai, MD

## 2023-08-08 ENCOUNTER — TELEPHONE (OUTPATIENT)
Dept: FAMILY MEDICINE | Facility: CLINIC | Age: 73
End: 2023-08-08
Payer: COMMERCIAL

## 2023-08-08 DIAGNOSIS — E03.9 HYPOTHYROIDISM, UNSPECIFIED TYPE: Primary | ICD-10-CM

## 2023-08-08 LAB — HCV AB SERPL QL IA: REACTIVE

## 2023-08-08 RX ORDER — LEVOTHYROXINE SODIUM 25 UG/1
25 TABLET ORAL DAILY
Qty: 90 TABLET | Refills: 0 | Status: SHIPPED | OUTPATIENT
Start: 2023-08-08 | End: 2023-11-28

## 2023-08-08 NOTE — TELEPHONE ENCOUNTER
Spoke with patient and informed of results below. Patient understood. Please send script to the [facility], [place] pharmacy no medications are listed in his chart.     Okay to sign once completed. Patient will  in next couple of days.   Paola Mortensen MA

## 2023-08-08 NOTE — TELEPHONE ENCOUNTER
----- Message from Yumiko Denton Mai, MD sent at 8/7/2023  4:38 PM CDT -----  Please let patient know that his labs indicate that his thyroid is not underactive.  I recommend to start a very low dose of levothyroxine daily and recheck the level in 3 months.  The prescription will be sent to the pharmacy and please take it as prescribed.  His kidney function and electrolytes were normal.  Her blood sugar is minimally elevated, doubtful that he has diabetes.  Will continue to keep an eye on it.  CBC was normal, no anemia.  His urine test as expected was normal as well.  Thank you

## 2023-08-09 NOTE — TELEPHONE ENCOUNTER
MA called patient. We scheduled lab only appointment for 3 months. Closing encounter.      Jade Morrell MA

## 2023-08-10 LAB — HCV RNA SERPL NAA+PROBE-ACNC: NOT DETECTED IU/ML

## 2023-08-11 PROBLEM — E03.8 OTHER SPECIFIED HYPOTHYROIDISM: Status: ACTIVE | Noted: 2023-08-07

## 2023-08-12 NOTE — PATIENT INSTRUCTIONS
For informational purposes only. Not to replace the advice of your health care provider. Copyright   2003,  [employer] [facility]. All rights reserved. Clinically reviewed by Myla Jc MD. [employer] [id number] - REV .  Preparing for Your Surgery  Getting started  A nurse will call you to review your health history and instructions. They will give you an arrival time based on your scheduled surgery time. Please be ready to share:  Your doctor's clinic name and phone number  Your medical, surgical, and anesthesia history  A list of allergies and sensitivities  A list of medicines, including herbal treatments and over-the-counter drugs  Whether the patient has a legal guardian (ask how to send us the papers in advance)  Please tell us if you're pregnant--or if there's any chance you might be pregnant. Some surgeries may injure a fetus (unborn baby), so they require a pregnancy test. Surgeries that are safe for a fetus don't always need a test, and you can choose whether to have one.   If you have a child who's having surgery, please ask for a copy of Preparing for Your Child's Surgery.    Preparing for surgery  Within 10 to 30 days of surgery: Have a pre-op exam (sometimes called an H&P, or History and Physical). This can be done at a clinic or pre-operative center.  If you're having a , you may not need this exam. Talk to your care team.  At your pre-op exam, talk to your care team about all medicines you take. If you need to stop any medicines before surgery, ask when to start taking them again.  We do this for your safety. Many medicines can make you bleed too much during surgery. Some change how well surgery (anesthesia) drugs work.  Call your insurance company to let them know you're having surgery. (If you don't have insurance, call [phone].)  Call your clinic if there's any change in your health. This includes signs of a cold or flu (sore throat, runny nose, cough, rash, fever). It also  includes a scrape or scratch near the surgery site.  If you have questions on the day of surgery, call your hospital or surgery center.  Eating and drinking guidelines  For your safety: Unless your surgeon tells you otherwise, follow the guidelines below.  Eat and drink as usual until 8 hours before you arrive for surgery. After that, no food or milk.  Drink clear liquids until 2 hours before you arrive. These are liquids you can see through, like water, Gatorade, and Propel Water. They also include plain black coffee and tea (no cream or milk), candy, and breath mints. You can spit out gum when you arrive.  If you drink alcohol: Stop drinking it the night before surgery.  If your care team tells you to take medicine on the morning of surgery, it's okay to take it with a sip of water.  Preventing infection  Shower or bathe the night before and morning of your surgery. Follow the instructions your clinic gave you. (If no instructions, use regular soap.)  Don't shave or clip hair near your surgery site. We'll remove the hair if needed.  Don't smoke or vape the morning of surgery. You may chew nicotine gum up to 2 hours before surgery. A nicotine patch is okay.  Note: Some surgeries require you to completely quit smoking and nicotine. Check with your surgeon.  Your care team will make every effort to keep you safe from infection. We will:  Clean our hands often with soap and water (or an alcohol-based hand rub).  Clean the skin at your surgery site with a special soap that kills germs.  Give you a special gown to keep you warm. (Cold raises the risk of infection.)  Wear special hair covers, masks, gowns and gloves during surgery.  Give antibiotic medicine, if prescribed. Not all surgeries need antibiotics.  What to bring on the day of surgery  Photo ID and insurance card  Copy of your health care directive, if you have one  Glasses and hearing aids (bring cases)  You can't wear contacts during surgery  Inhaler and eye  drops, if you use them (tell us about these when you arrive)  CPAP machine or breathing device, if you use them  A few personal items, if spending the night  If you have . . .  A pacemaker, ICD (cardiac defibrillator) or other implant: Bring the ID card.  An implanted stimulator: Bring the remote control.  A legal guardian: Bring a copy of the certified (court-stamped) guardianship papers.  Please remove any jewelry, including body piercings. Leave jewelry and other valuables at home.  If you're going home the day of surgery  You must have a responsible adult drive you home. They should stay with you overnight as well.  If you don't have someone to stay with you, and you aren't safe to go home alone, we may keep you overnight. Insurance often won't pay for this.  After surgery  If it's hard to control your pain or you need more pain medicine, please call your surgeon's office.  Questions?   If you have any questions for your care team, list them here: _________________________________________________________________________________________________________________________________________________________________________ ____________________________________ ____________________________________ ____________________________________    How to Take Your Medication Before Surgery      Please take your prescribed medications as prescribed except.  No Aspirin or NSAID (Ibuprofen, Aleve, Motrin, Naproxen, ect.) for 7 days before the procedure  Take shower with provided shampoo the day before and am of procedure  No alcohol or smoking for 24 before the procedure  Nothing to eat for 8 hrs before the procedure.

## 2023-11-09 ENCOUNTER — LAB (OUTPATIENT)
Dept: LAB | Facility: CLINIC | Age: 73
End: 2023-11-09
Payer: COMMERCIAL

## 2023-11-09 DIAGNOSIS — E03.9 HYPOTHYROIDISM, UNSPECIFIED TYPE: ICD-10-CM

## 2023-11-09 LAB
T4 FREE SERPL-MCNC: 1.07 NG/DL (ref 0.9–1.7)
TSH SERPL DL<=0.005 MIU/L-ACNC: 5.23 UIU/ML (ref 0.3–4.2)

## 2023-11-09 PROCEDURE — 84439 ASSAY OF FREE THYROXINE: CPT

## 2023-11-09 PROCEDURE — 84443 ASSAY THYROID STIM HORMONE: CPT

## 2023-11-09 PROCEDURE — 36415 COLL VENOUS BLD VENIPUNCTURE: CPT

## 2023-11-13 ENCOUNTER — TELEPHONE (OUTPATIENT)
Dept: FAMILY MEDICINE | Facility: CLINIC | Age: 73
End: 2023-11-13

## 2023-11-13 RX ORDER — LEVOTHYROXINE SODIUM 50 UG/1
50 TABLET ORAL DAILY
Qty: 90 TABLET | Refills: 1 | Status: CANCELLED | OUTPATIENT
Start: 2023-11-13

## 2023-11-13 NOTE — TELEPHONE ENCOUNTER
Patient returned call and informed of his lab results as noted by Dr. Alba and of new orders.     New dosing placed on levothyroxine per Dr. Alba's note. Lab order placed to recheck TSH again in 3 months as noted per Dr. Alba.    Patient has been scheduled for a physical on 11/28. Isamar Allred LPN

## 2023-11-28 ENCOUNTER — OFFICE VISIT (OUTPATIENT)
Dept: FAMILY MEDICINE | Facility: CLINIC | Age: 73
End: 2023-11-28
Payer: COMMERCIAL

## 2023-11-28 VITALS
SYSTOLIC BLOOD PRESSURE: 150 MMHG | DIASTOLIC BLOOD PRESSURE: 82 MMHG | RESPIRATION RATE: 14 BRPM | WEIGHT: 232 LBS | HEIGHT: 71 IN | HEART RATE: 82 BPM | OXYGEN SATURATION: 93 % | TEMPERATURE: 97.3 F | BODY MASS INDEX: 32.48 KG/M2

## 2023-11-28 DIAGNOSIS — Z00.00 ENCOUNTER FOR MEDICARE ANNUAL WELLNESS EXAM: Primary | ICD-10-CM

## 2023-11-28 DIAGNOSIS — I10 PRIMARY HYPERTENSION: ICD-10-CM

## 2023-11-28 DIAGNOSIS — E03.9 HYPOTHYROIDISM, UNSPECIFIED TYPE: ICD-10-CM

## 2023-11-28 PROCEDURE — 99214 OFFICE O/P EST MOD 30 MIN: CPT | Mod: 25 | Performed by: FAMILY MEDICINE

## 2023-11-28 PROCEDURE — G0439 PPPS, SUBSEQ VISIT: HCPCS | Performed by: FAMILY MEDICINE

## 2023-11-28 RX ORDER — LOSARTAN POTASSIUM 25 MG/1
25 TABLET ORAL DAILY
Qty: 30 TABLET | Refills: 1 | Status: SHIPPED | OUTPATIENT
Start: 2023-11-28 | End: 2024-01-30

## 2023-11-28 RX ORDER — LEVOTHYROXINE SODIUM 25 UG/1
25 TABLET ORAL DAILY
Qty: 90 TABLET | Refills: 3 | Status: SHIPPED | OUTPATIENT
Start: 2023-11-28

## 2023-11-28 ASSESSMENT — ENCOUNTER SYMPTOMS
CONSTIPATION: 0
DIZZINESS: 0
ARTHRALGIAS: 1
NERVOUS/ANXIOUS: 0
DYSURIA: 0
FREQUENCY: 0
NAUSEA: 0
PARESTHESIAS: 0
ABDOMINAL PAIN: 0
FEVER: 0
DIARRHEA: 0
HEMATURIA: 0
COUGH: 0
CHILLS: 0
JOINT SWELLING: 0
HEARTBURN: 0
PALPITATIONS: 0
MYALGIAS: 0
HEADACHES: 0
SORE THROAT: 0
HEMATOCHEZIA: 0
SHORTNESS OF BREATH: 0
WEAKNESS: 0
EYE PAIN: 0

## 2023-11-28 ASSESSMENT — ACTIVITIES OF DAILY LIVING (ADL): CURRENT_FUNCTION: NO ASSISTANCE NEEDED

## 2023-11-28 ASSESSMENT — PAIN SCALES - GENERAL: PAINLEVEL: NO PAIN (0)

## 2023-11-28 NOTE — PATIENT INSTRUCTIONS
Patient Education   Personalized Prevention Plan  You are due for the preventive services outlined below.  Your care team is available to assist you in scheduling these services.  If you have already completed any of these items, please share that information with your care team to update in your medical record.  Health Maintenance Due   Topic Date Due     ANNUAL REVIEW OF HM ORDERS  Never done     Zoster (Shingles) Vaccine (1 of 2) Never done     RSV VACCINE (Pregnancy & 60+) (1 - 1-dose 60+ series) Never done     Pneumococcal Vaccine (1 - PCV) Never done     AORTIC ANEURYSM SCREENING (SYSTEM ASSIGNED)  Never done     Diptheria Tetanus Pertussis (DTAP/TDAP/TD) Vaccine (2 - Td or Tdap) 02/04/2019      Patient Education   Alcohol Use     Many people can enjoy a glass of wine or beer without any negative consequences to their health. According to the Centers for Disease Control and Prevention (CDC), having one or fewer drinks per day for women and two or fewer per day for men is considered moderate drinking.     When people drink more than moderately, it can become concerning. Excessive drinking is defined as consuming 15 drinks or more per week for men and 8 drinks or more per week for women. There are various health problems associated with excessive drinking, which include:  Damage to vital organs like the heart, brain, liver and pancreas  Harm to the digestive tract  Weaken the immune system  Higher risk for heart disease and cancer    There are many resources available to people who are addicted to alcohol. A counselor or other health care provider can give you support. So can a , , or rabbi who is trained in substance abuse counseling. Friends and family may also help once you are connected with professionals.       Learning About Dietary Guidelines  What are the Dietary Guidelines for Americans?     Dietary Guidelines for Americans provide tips for eating well and staying healthy. This helps  reduce the risk for long-term (chronic) diseases.  These guidelines recommend that you:  Eat and drink the right amount for you. The U.[place] government's food guide is called MyPlate. It can help you make your own well-balanced eating plan.  Try to balance your eating with your activity. This helps you stay at a healthy weight.  Drink alcohol in moderation, if at all.  Limit foods high in salt, saturated fat, trans fat, and added sugar.  These guidelines are from the U.S. Department of Agriculture and the U.S. Department of Health and Human Services. They are updated every 5 years.  What is MyPlate?  MyPlate is the [place] government's food guide. It can help you make your own well-balanced eating plan. A balanced eating plan means that you eat enough, but not too much, and that your food gives you the nutrients you need to stay healthy.  MyPlate focuses on eating plenty of whole grains, fruits, and vegetables, and on limiting fat and sugar. It is available online at www.ChooseMyPlate.gov.  How can you get started?  If you're trying to eat healthier, you can slowly change your eating habits over time. You don't have to make big changes all at once. Start by adding one or two healthy foods to your meals each day.  Grains  Choose whole-grain breads and cereals and whole-wheat pasta and whole-grain crackers.  Vegetables  Eat a variety of vegetables every day. They have lots of nutrients and are part of a heart-healthy diet.  Fruits  Eat a variety of fruits every day. Fruits contain lots of nutrients. Choose fresh fruit instead of fruit juice.  Protein foods  Choose fish and lean poultry more often. Eat red meat and fried meats less often. Dried beans, tofu, and nuts are also good sources of protein.  Dairy  Choose low-fat or fat-free products from this food group. If you have problems digesting milk, try eating cheese or yogurt instead.  Fats and oils  Limit fats and oils if you're trying to cut calories. Choose healthy fats  "when you cook. These include canola oil and olive oil.  Where can you learn more?  Go to https://www.[employer].net/patiented  Enter D676 in the search box to learn more about \"Learning About Dietary Guidelines.\"  Current as of: February 28, 2023               Content Version: 13.8    3874-7604 [employer]   Care instructions adapted under license by your healthcare professional. If you have questions about a medical condition or this instruction, always ask your healthcare professional. [employer] disclaims any warranty or liability for your use of this information.         "

## 2023-11-28 NOTE — PROGRESS NOTES
"The patient reports that he drinks more than one alcoholic drink per day and sometimes engages in binge or excessive drinking. He was counseled and given information about possible harmful effects of excessive alcohol intake as well as where to get help for alcohol problems.  The patient was counseled and encouraged to consider modifying their diet and eating habits. He was provided with information on recommended healthy diet options.  Answers submitted by the patient for this visit:  Annual Preventive Visit (Submitted on 11/28/2023)  Chief Complaint: Annual Exam:  In general, how would you rate your overall physical health?: good  Frequency of exercise:: 2-3 days/week  Do you usually eat at least 4 servings of fruit and vegetables a day, include whole grains & fiber, and avoid regularly eating high fat or \"junk\" foods? : No  Taking medications regularly:: Yes  Activities of Daily Living: no assistance needed  Home safety: no safety concerns identified  Hearing Impairment:: no hearing concerns  In the past 6 months, have you been bothered by leaking of urine?: No  abdominal pain: No  Blood in stool: No  Blood in urine: No  chest pain: No  chills: No  congestion: No  constipation: No  cough: No  diarrhea: No  dizziness: No  ear pain: No  eye pain: No  nervous/anxious: No  fever: No  frequency: No  genital sores: No  headaches: No  hearing loss: No  heartburn: No  arthralgias: Yes  joint swelling: No  peripheral edema: No  mood changes: No  myalgias: No  nausea: No  dysuria: No  palpitations: No  Skin sensation changes: No  sore throat: No  urgency: No  rash: No  shortness of breath: No  visual disturbance: No  weakness: No  impotence: No  penile discharge: No  In general, how would you rate your overall mental or emotional health?: good  Additional concerns today:: No  Exercise outside of work (Submitted on 11/28/2023)  Chief Complaint: Annual Exam:  Duration of exercise:: 15-30 minutes    "

## 2023-11-28 NOTE — PROGRESS NOTES
"SUBJECTIVE:   Miguelangel is a 73 year old, presenting for the following:  Wellness Visit        11/28/2023    11:08 AM   Additional Questions   Roomed by Jed Bishop CMA       Are you in the first 12 months of your Medicare coverage?  No    Healthy Habits:     In general, how would you rate your overall health?  Good    Frequency of exercise:  2-3 days/week    Duration of exercise:  15-30 minutes    Do you usually eat at least 4 servings of fruit and vegetables a day, include whole grains    & fiber and avoid regularly eating high fat or \"junk\" foods?  No    Taking medications regularly:  Yes    Ability to successfully perform activities of daily living:  No assistance needed    Home Safety:  No safety concerns identified    Hearing Impairment:  No hearing concerns    In the past 6 months, have you been bothered by leaking of urine?  No    In general, how would you rate your overall mental or emotional health?  Good    Additional concerns today:  No      Today's PHQ-2 Score:       11/28/2023    11:03 AM   PHQ-2 ( 1999 [employer])   Q1: Little interest or pleasure in doing things 0   Q2: Feeling down, depressed or hopeless 0   PHQ-2 Score 0   Q1: Little interest or pleasure in doing things Not at all   Q2: Feeling down, depressed or hopeless Not at all   PHQ-2 Score 0       Miguelangel is here today for physical and general follow-up.  Stated overall he is doing well, no specific concerns today.  He takes levothyroxine for hypothyroidism as prescribed.  No major medical care or procedure done since the last physical couple years ago. No headache, dizziness, acute visual change. No runny nose, nasal congestion, coughing, fever or chill. No CP/SOB. No N/V/D/C or problem with urination.  No abdominal pain. No leg swelling, orthopnea or dyspnea.  No joint pain exercise daily with walking.  Drink about 2 drinks of beer a day but no drug use.  Quit smoking send 2085. No problem with sleeping.  Feels safe, lives with his wife.  No " weight change and denied of being depressed.  No other concern today       Have you ever done Advance Care Planning? (For example, a Health Directive, POLST, or a discussion with a medical provider or your loved ones about your wishes): No, advance care planning information given to patient to review.  Patient declined advance care planning discussion at this time.       Fall risk  Fallen 2 or more times in the past year?: No  Any fall with injury in the past year?: No    Cognitive Screening   1) Repeat 3 items (Leader, Season, Table)    2) Clock draw: +NORMAL  3) 3 item recall: Recalls 3 objects  Results: 3 items recalled: COGNITIVE IMPAIRMENT LESS LIKELY    Mini-CogTM Copyright S Ivette. Licensed by the author for use in [facility]; reprinted with permission (alejandro@[email or web address]). All rights reserved.      Do you have sleep apnea, excessive snoring or daytime drowsiness? : no    Reviewed and updated as needed this visit by clinical staff   Tobacco  Allergies  Meds  Problems  Med Hx  Surg Hx  Fam Hx  Soc   Hx        Reviewed and updated as needed this visit by Provider   Tobacco  Allergies  Meds  Problems  Med Hx  Surg Hx  Fam Hx  Soc   Hx       Social History     Tobacco Use    Smoking status: Former     Types: Cigarettes     Quit date: 1985     Years since quittin.9    Smokeless tobacco: Never   Substance Use Topics    Alcohol use: Yes     Alcohol/week: 14.0 standard drinks of alcohol     Types: 7 Cans of beer, 7 Shots of liquor per week     Comment: 2 daily             2023    11:03 AM   Alcohol Use   Prescreen: >3 drinks/day or >7 drinks/week? Yes   AUDIT SCORE  7         2023    11:03 AM   AUDIT - Alcohol Use Disorders Identification Test - Reproduced from the World Health Organization Audit 2001 (Second Edition)   1.  How often do you have a drink containing alcohol? 4 or more times a week   2.  How many drinks containing alcohol do you have on a typical day when you  are drinking? 1 or 2   3.  How often do you have five or more drinks on one occasion? Weekly   4.  How often during the last year have you found that you were not able to stop drinking once you had started? Never   5.  How often during the last year have you failed to do what was normally expected of you because of drinking? Never   6.  How often during the last year have you needed a first drink in the morning to get yourself going after a heavy drinking session? Never   7.  How often during the last year have you had a feeling of guilt or remorse after drinking? Never   8.  How often during the last year have you been unable to remember what happened the night before because of your drinking? Never   9.  Have you or someone else been injured because of your drinking? No   10. Has a relative, friend, doctor or other health care worker been concerned about your drinking or suggested you cut down? No   TOTAL SCORE 7     Do you have a current opioid prescription? No  Do you use any other controlled substances or medications that are not prescribed by a provider? Alcohol        Current providers sharing in care for this patient include:   Patient Care Team:  No Ref-Primary, Physician as PCP - General  Yumiko Alba MD as Assigned PCP    The following health maintenance items are reviewed in Epic and correct as of today:  Health Maintenance   Topic Date Due    ANNUAL REVIEW OF  ORDERS  Never done    ZOSTER IMMUNIZATION (1 of 2) Never done    RSV VACCINE (Pregnancy & 60+) (1 - 1-dose 60+ series) Never done    AORTIC ANEURYSM SCREENING (SYSTEM ASSIGNED)  Never done    TSH W/FREE T4 REFLEX  11/09/2024    MEDICARE ANNUAL WELLNESS VISIT  11/28/2024    FALL RISK ASSESSMENT  11/28/2024    COLORECTAL CANCER SCREENING  01/19/2025    LIPID  12/03/2026    ADVANCE CARE PLANNING  12/10/2028    DTAP/TDAP/TD IMMUNIZATION (3 - Td or Tdap) 11/29/2033    HEPATITIS C SCREENING  Completed    PHQ-2 (once per calendar year)  Completed     INFLUENZA VACCINE  Completed    Pneumococcal Vaccine: 65+ Years  Completed    COVID-19 Vaccine  Completed    IPV IMMUNIZATION  Aged Out    HPV IMMUNIZATION  Aged Out    MENINGITIS IMMUNIZATION  Aged Out    RSV MONOCLONAL ANTIBODY  Aged Out     BP Readings from Last 3 Encounters:   11/28/23 (!) 150/82   08/07/23 122/82   03/03/23 (!) 135/96    Wt Readings from Last 3 Encounters:   11/28/23 105.2 kg (232 lb)   08/07/23 100.7 kg (222 lb)   03/03/23 104.1 kg (229 lb 9.6 oz)                  Patient Active Problem List   Diagnosis    S/P rotator cuff repair    Kidney stone    Other specified hypothyroidism    Primary hypertension     Past Surgical History:   Procedure Laterality Date    ARTHROSCOPY SHOULDER BICEPS TENODESIS REPAIR Right 12/12/2017    Procedure: ARTHROSCOPY SHOULDER BICEPS TENODESIS REPAIR;;  Surgeon: Jose Jones DO;  Location[facility]    ARTHROSCOPY SHOULDER DECOMPRESSION Right 12/12/2017    Procedure: ARTHROSCOPY SHOULDER DECOMPRESSION;;  Surgeon: Jose Jones DO;  Location[facility]    ARTHROSCOPY SHOULDER DISTAL CLAVICLE REPAIR Right 12/12/2017    Procedure: ARTHROSCOPY SHOULDER DISTAL CLAVICLE RESECTION;;  Surgeon: Jose Jones DO;  Location[facility]    ARTHROSCOPY SHOULDER, OPEN ROTATOR CUFF REPAIR, COMBINED Right 12/12/2017    Procedure: COMBINED ARTHROSCOPY SHOULDER, OPEN ROTATOR CUFF REPAIR;  right shoulder arthroscopy with open mini rotator cuff repair, biceps tenodesis, distal clavicle and subacromial decompression with partial acromioplasty and removal of foreing body;  Surgeon: Jose Jones DO;  Location: [facility]    COLONOSCOPY N/A 1/19/2022    Procedure: COLONOSCOPY, WITH POLYPECTOMY;  Surgeon: John Mathis MD;  Location: [facility]    HC KNEE SCOPE, DIAGNOSTIC      Arthroscopy, Knee    HC REPAIR ROTATOR CUFF,CHRONIC      PHACOEMULSIFICATION WITH STANDARD INTRAOCULAR LENS IMPLANT Right 10/17/2019    Procedure: PHACOEMULSIFICATION, CATARACT Right  WITH STANDARD IOL INSERTION;  Surgeon: Jf Montiel MD;  [facility]    PHACOEMULSIFICATION WITH STANDARD INTRAOCULAR LENS IMPLANT Left 10/31/2019    Procedure: PHACOEMULSIFICATION, CATARACT Left eye WITH STANDARD IOL INSERTION;  Surgeon: Jf Montiel MD;  Location: [facility]    REMOVE FOREIGN BODY UPPER EXTREMITY Right 2017    Procedure: REMOVE FOREIGN BODY UPPER EXTREMITY;;  Surgeon: Jose Jones DO;  Location: [facility]       Social History     Tobacco Use    Smoking status: Former     Types: Cigarettes     Quit date: 1985     Years since quittin.9    Smokeless tobacco: Never   Substance Use Topics    Alcohol use: Yes     Alcohol/week: 14.0 standard drinks of alcohol     Types: 7 Cans of beer, 7 Shots of liquor per week     Comment: 2 daily     Family History   Problem Relation Age of Onset    Cancer Mother          of cancer - pancreatic cancer    Alzheimer Disease Father     Alzheimer Disease Sister     Diabetes Sister     Alzheimer Disease Sister     Diabetes Sister     Alzheimer Disease Sister     Unknown/Adopted Sister     No Known Problems Sister     Coronary Artery Disease Sister          of CHF    Coronary Artery Disease Brother          of MI    Alzheimer Disease Brother     Diabetes Brother     Diabetes Brother     Diabetes Brother     Diabetes Brother     Coronary Artery Disease Brother     Diabetes Brother     No Known Problems Daughter     No Known Problems Son          Current Outpatient Medications   Medication Sig Dispense Refill    levothyroxine (SYNTHROID/LEVOTHROID) 25 MCG tablet Take 1 tablet (25 mcg) by mouth daily 90 tablet 3    losartan (COZAAR) 25 MG tablet Take 1 tablet (25 mg) by mouth daily 30 tablet 1     Allergies   Allergen Reactions    No Known Drug Allergy      Recent Labs   Lab Test 23  1006 23  1322 23  2137 22  0806 21  0936 20  1023 19  1017   0000   LDL  --   --   --   --  81  --   "108*  --    HDL  --   --   --   --  50  --  47  --    TRIG  --   --   --   --  212*  --  173*  --    ALT  --   --   --   --  22  --  29  --    CR  --  1.07 0.87  --  0.81 0.82 0.95   < >   GFRESTIMATED  --  74 >90  --  >90 >90 83   < >   GFRESTBLACK  --   --   --   --   --  >90 >90  --    POTASSIUM  --  4.3 3.9  --  3.9 4.2 4.2  --    TSH 5.23* 8.30*  --    < > 6.49*  --   --   --     < > = values in this interval not displayed.          Review of Systems   Constitutional:  Negative for chills and fever.   HENT:  Negative for congestion, ear pain, hearing loss and sore throat.    Eyes:  Negative for pain and visual disturbance.   Respiratory:  Negative for cough and shortness of breath.    Cardiovascular:  Negative for chest pain, palpitations and peripheral edema.   Gastrointestinal:  Negative for abdominal pain, constipation, diarrhea, heartburn, hematochezia and nausea.   Genitourinary:  Negative for dysuria, frequency, genital sores, hematuria, impotence, penile discharge and urgency.   Musculoskeletal:  Positive for arthralgias. Negative for joint swelling and myalgias.   Skin:  Negative for rash.   Neurological:  Negative for dizziness, weakness, headaches and paresthesias.   Psychiatric/Behavioral:  Negative for mood changes. The patient is not nervous/anxious.          OBJECTIVE:   BP (!) 150/82   Pulse 82   Temp 97.3  F (36.3  C) (Temporal)   Resp 14   Ht 1.803 m (5' 11\")   Wt 105.2 kg (232 lb)   SpO2 93%   BMI 32.36 kg/m   Estimated body mass index is 32.36 kg/m  as calculated from the following:    Height as of this encounter: 1.803 m (5' 11\").    Weight as of this encounter: 105.2 kg (232 lb).  Physical Exam  GENERAL: healthy, alert and no distress, speaking in full sentences  EYES: Eyes grossly normal to inspection, PERRL and conjunctivae and sclerae normal.  No nystagmus.  All 4 visual fields are intact  HENT: Ear canals and TM's normal.  Nares are non-congested. Oropharynx is pink and moist. No " tender with palpation to the sinuses.  NECK: no adenopathy, supple, no lymphadenopathy or thyromegaly.  No tender with palpation to the cervical spine or its paraspinous muscle.  No JV distention or carotid bruits  RESP: lungs clear to auscultation - no rales, rhonchi or wheezes  CV: regular rate and rhythm, no murmur.  ABDOMEN: soft, nondistended, nontender, no palpable masses or organomegaly with normal bowel sounds.  MS: no gross musculoskeletal defects noted, no edema.  Walk with no limping, normal gait.  All 4 extremities are equally in strength. Ankle, knees, hips, shoulders, elbows and wrists exams normal.  Normal fine motor skills on fingers.  Back is straight, no lordosis or scoliosis.  No tender with palpation to the spine.  SKIN: no suspicious lesions or rashes  NEURO: Normal strength and tone, mentation intact and speech normal.  Cranial nerves II through XII intact, DTR +2 throughout, no focal neurological deficit.  PSYCH: mentation appears normal, affect normal/bright.  Thoughts intact, no hallucination.  No suicidal or homicidal ideation.  LYMPH: no cervical, supraclavicular or axillary adenopathy.   (male): Offered but he declined.  He has no concern about it       Diagnostic Test Results:  Labs reviewed in Epic  No results found for any visits on 11/28/23.    Labs on 11/9/23: TSH of 5.23 with normal free T4 level.  Labs on 8/23/2023: Hemoglobin was 14.3, potassium 4.5, creatinine of 0.39 with a GFR of 88%  Lipid panel, 2021 was normal.     ASSESSMENT / PLAN:   (Z00.00) Encounter for Medicare annual wellness exam  (primary encounter diagnosis)  Comment: Overall Miguelangel is healthy and is doing well.  UTD for immunization except of the shingle and RSV vaccinations, he was recommended to get them it through his pharmacy.  Discussed about safety issue, healthy diet, exercising, weight management, good sleeping hygiene, advanced directive care, falling prevention, and depression prevention. Recommended  daily exercise for at least 30 minutes.  Emphasized on healthy diet with adequate fluid intake and resting.  No safety or mental health concerns.  Follow in 1 year for physical, earlier as needed.      Colon cancer screening: Last colonoscopy was in January 2022 with 3 polyps; 2023 was positive for tubular adenoma and the rectal polyp was hyperplastic polyp.  He was recommended to repeat in 3 years.    Prostate cancer screening:  Discussed with him about the pros and cons of prostate screening cancer with PSA.  Also educated about the potential false positive which may require unnecessary work-up.  He was informed of negative/normal PSA leve does not rule out prostate cancer completely although it is very unlikely.  He declined prostate cancer screening with a PSA level today.  Also declined prostate digital exam.    Cholesterol: Last cholesterol check was in 2021 it was normal.  No history of high cholesterol.  Offered to recheck the cholesterol level today versus to recheck it every 4-5 years based on the current guidelines.  He wanted to hold off on the cholesterol checked today.    DM Screening: Recent done nonfasting blood sugar was 111.  No history of diabetes.  Denies diabetic symptoms.  Discussed about diabetic screening but he declined.    Discussed about aortic aneurysm screening but he declined.  He quit smoking in 1985.    All of his questions were answered.       (I10) Primary hypertension  Comment: New diagnoses and his BP is high today. Reviewed medical record and his blood pressure has been high in the last 6 months.   Never been on any medication for this.  Discussed about the potential complications associated with uncontrolled high blood pressure.  The goal for his blood pressure to be around 140/90.  He otherwise healthy.  Will restart him on losartan at 25 mg daily and potential side effects discussed.  Healthy/low-salt diet, exercising and weight management discussed and encouraged.  Also  "encouraged to avoid high caffeine intake.  Return to the clinic for blood pressure check with the nursing staff in a week to 10 days, medication adjustmen to be considered if indicated.    Plan: losartan (COZAAR) 25 MG tablet            (E03.9) Hypothyroidism, unspecified type  Comment: TSH level a month ago and it was slightly high with normal free T4.  Will continue with the current levothyroxine 25 mcg daily.  Recheck the level in 3 months.    Plan: levothyroxine (SYNTHROID/LEVOTHROID) 25 MCG         tablet            Patient has been advised of split billing requirements and indicates understanding: Yes      COUNSELING:  Reviewed preventive health counseling, as reflected in patient instructions       Regular exercise       Healthy diet/nutrition       Vision screening       Hearing screening       Dental care       Fall risk prevention       Alcohol Use        Colon cancer screening       Prostate cancer screening      BMI:   Estimated body mass index is 32.36 kg/m  as calculated from the following:    Height as of this encounter: 1.803 m (5' 11\").    Weight as of this encounter: 105.2 kg (232 lb).   Weight management plan: Discussed healthy diet and exercise guidelines      He reports that he quit smoking about 38 years ago. He has never used smokeless tobacco.      Appropriate preventive services were discussed with this patient, including applicable screening as appropriate for fall prevention, nutrition, physical activity, Tobacco-use cessation, weight loss and cognition.  Checklist reviewing preventive services available has been given to the patient.    Reviewed patients plan of care and provided an AVS. The Basic Care Plan (routine screening as documented in Health Maintenance) for Miguelangel meets the Care Plan requirement. This Care Plan has been established and reviewed with the Patient.          Yumiko Denton Mai, MD  [facility]    Identified Health Risks:  I have reviewed Opioid Use " Disorder and Substance Use Disorder risk factors and made any needed referrals.

## 2023-12-28 RX ORDER — RESPIRATORY SYNCYTIAL VIRUS VACCINE 120MCG/0.5
0.5 KIT INTRAMUSCULAR ONCE
Qty: 1 EACH | Refills: 0 | Status: CANCELLED | OUTPATIENT
Start: 2023-12-28 | End: 2023-12-28

## 2023-12-29 ENCOUNTER — OFFICE VISIT (OUTPATIENT)
Dept: FAMILY MEDICINE | Facility: CLINIC | Age: 73
End: 2023-12-29
Payer: COMMERCIAL

## 2023-12-29 VITALS
OXYGEN SATURATION: 97 % | TEMPERATURE: 97.9 F | WEIGHT: 230.6 LBS | DIASTOLIC BLOOD PRESSURE: 74 MMHG | BODY MASS INDEX: 32.28 KG/M2 | RESPIRATION RATE: 18 BRPM | HEIGHT: 71 IN | HEART RATE: 94 BPM | SYSTOLIC BLOOD PRESSURE: 138 MMHG

## 2023-12-29 DIAGNOSIS — E03.9 HYPOTHYROIDISM, UNSPECIFIED TYPE: ICD-10-CM

## 2023-12-29 DIAGNOSIS — T14.8XXA MUSCLE STRAIN: Primary | ICD-10-CM

## 2023-12-29 DIAGNOSIS — I10 PRIMARY HYPERTENSION: ICD-10-CM

## 2023-12-29 PROCEDURE — 99214 OFFICE O/P EST MOD 30 MIN: CPT | Performed by: NURSE PRACTITIONER

## 2023-12-29 ASSESSMENT — ENCOUNTER SYMPTOMS
ABDOMINAL PAIN: 1
ABDOMINAL DISTENTION: 0
FEVER: 0
VOMITING: 0
NAUSEA: 0
CONSTIPATION: 0
DIARRHEA: 0
UNEXPECTED WEIGHT CHANGE: 0
HEARTBURN: 0

## 2023-12-29 ASSESSMENT — PAIN SCALES - GENERAL: PAINLEVEL: NO PAIN (0)

## 2023-12-29 NOTE — PATIENT INSTRUCTIONS
DASH Diet: Care Instructions  Your Care Instructions     The DASH diet is an eating plan that can help lower your blood pressure. DASH stands for Dietary Approaches to Stop Hypertension. Hypertension is high blood pressure.  The DASH diet focuses on eating foods that are high in calcium, potassium, and magnesium. These nutrients can lower blood pressure. The foods that are highest in these nutrients are fruits, vegetables, low-fat dairy products, nuts, seeds, and legumes. But taking calcium, potassium, and magnesium supplements instead of eating foods that are high in those nutrients does not have the same effect. The DASH diet also includes whole grains, fish, and poultry.  The DASH diet is one of several lifestyle changes your doctor may recommend to lower your high blood pressure. Your doctor may also want you to decrease the amount of sodium in your diet. Lowering sodium while following the DASH diet can lower blood pressure even further than just the DASH diet alone.  Follow-up care is a key part of your treatment and safety. Be sure to make and go to all appointments, and call your doctor if you are having problems. It's also a good idea to know your test results and keep a list of the medicines you take.  How can you care for yourself at home?  Following the DASH diet  Eat 4 to 5 servings of fruit each day. A serving is 1 medium-sized piece of fruit,   cup chopped or canned fruit, 1/4 cup dried fruit, or 4 ounces (  cup) of fruit juice. Choose fruit more often than fruit juice.  Eat 4 to 5 servings of vegetables each day. A serving is 1 cup of lettuce or raw leafy vegetables,   cup of chopped or cooked vegetables, or 4 ounces (  cup) of vegetable juice. Choose vegetables more often than vegetable juice.  Get 2 to 3 servings of low-fat and fat-free dairy each day. A serving is 8 ounces of milk, 1 cup of yogurt, or 1   ounces of cheese.  Eat 6 to 8 servings of grains each day. A serving is 1 slice of bread, 1  ounce of dry cereal, or   cup of cooked rice, pasta, or cooked cereal. Try to choose whole-grain products as much as possible.  Limit lean meat, poultry, and fish to 2 servings each day. A serving is 3 ounces, about the size of a deck of cards.  Eat 4 to 5 servings of nuts, seeds, and legumes (cooked dried beans, lentils, and split peas) each week. A serving is 1/3 cup of nuts, 2 tablespoons of seeds, or   cup of cooked beans or peas.  Limit fats and oils to 2 to 3 servings each day. A serving is 1 teaspoon of vegetable oil or 2 tablespoons of salad dressing.  Limit sweets and added sugars to 5 servings or less a week. A serving is 1 tablespoon jelly or jam,   cup sorbet, or 1 cup of lemonade.  Eat less than 2,300 milligrams (mg) of sodium a day. If you limit your sodium to 1,500 mg a day, you can lower your blood pressure even more.  Be aware that all of these are the suggested number of servings for people who eat 1,800 to 2,000 calories a day. Your recommended number of servings may be different if you need more or fewer calories.  Tips for success  Start small. Do not try to make dramatic changes to your diet all at once. You might feel that you are missing out on your favorite foods and then be more likely to not follow the plan. Make small changes, and stick with them. Once those changes become habit, add a few more changes.  Try some of the following:  Make it a goal to eat a fruit or vegetable at every meal and at snacks. This will make it easy to get the recommended amount of fruits and vegetables each day.  Try yogurt topped with fruit and nuts for a snack or healthy dessert.  Add lettuce, tomato, cucumber, and onion to sandwiches.  Combine a ready-made pizza crust with low-fat mozzarella cheese and lots of vegetable toppings. Try using tomatoes, squash, spinach, broccoli, carrots, cauliflower, and onions.  Have a variety of cut-up vegetables with a low-fat dip as an appetizer instead of chips and  "dip.  Sprinkle sunflower seeds or chopped almonds over salads. Or try adding chopped walnuts or almonds to cooked vegetables.  Try some vegetarian meals using beans and peas. Add garbanzo or kidney beans to salads. Make burritos and tacos with mashed vivar beans or black beans.  Where can you learn more?  Go to https://www.[employer].net/patiented  Enter H967 in the search box to learn more about \"DASH Diet: Care Instructions.\"  Current as of: March 1, 2023               Content Version: 13.7    5672-5836 [employer].   Care instructions adapted under license by your healthcare professional. If you have questions about a medical condition or this instruction, always ask your healthcare professional. [employer] disclaims any warranty or liability for your use of this information.   "

## 2023-12-29 NOTE — PROGRESS NOTES
Assessment & Plan     Muscle strain  -discussed with patient his symptoms and exam are consistent with abdominal muscle strain, likely brought on by chopping wood. Recommended continuing gentle exercises and stretching, can use a heating pack as needed or tylenol. Hand out on abdominal exercises provided.     Primary hypertension  -Was started on losartan 25 by PCP recently. Went in for his nurse only blood pressure recheck on 12/6/23  but left after waiting for an hour. Blood pressure today in clinic initially elevated but came down to 138/74. Patient denies side effects with medications. Encouraged him to start checking his blood pressure at home with his wife's blood pressure monitor and notify his PCP if his readings are consistently above 140/90.     Hypothyroidism, unspecified type  -Patient asked to have his lab results explained to him and why his PCP recommended increasing his dose of levothyroxine. Patient said the higher dose of levothyroxine made him sick so he is only taking 25 mcg at this time.       Review of the result(s) of each unique test - TSH, bmp      POONAM Peace CNP  M [facility] BRADLEY Means is a 73 year old, presenting for the following health issues:  Abdominal Pain and Hypertension        12/29/2023     1:02 PM   Additional Questions   Roomed by Darby NOVA   Accompanied by self       Was splitting wood a week ago. Bent down and felt like he tore something in his right upper abdomen. Tried standing and stretching it out. Now having continuing discomfort. Nothing makes it feel better or worse. Tried a Rolaid with no improvement. No history of stomach surgery.     History of Present Illness       Reason for visit:  Stomache  Symptom onset:  1-2 weeks ago  Symptom intensity:  Mild  Symptom progression:  Staying the same  Had these symptoms before:  Yes  Has tried/received treatment for these symptoms:  No  What makes it worse:  No  What makes it better:  " No    He eats 0-1 servings of fruits and vegetables daily.He consumes 1 sweetened beverage(s) daily.He exercises with enough effort to increase his heart rate 20 to 29 minutes per day.  He exercises with enough effort to increase his heart rate 6 days per week.   He is taking medications regularly.          Review of Systems   Constitutional:  Negative for fever and unexpected weight change.   Gastrointestinal:  Positive for abdominal pain. Negative for abdominal distention, constipation, diarrhea, heartburn, nausea and vomiting.            Objective    /74 (BP Location: Right arm, Patient Position: Sitting, Cuff Size: Adult Large)   Pulse 94   Temp 97.9  F (36.6  C) (Tympanic)   Resp 18   Ht 1.803 m (5' 11\")   Wt 104.6 kg (230 lb 9.6 oz)   SpO2 97%   BMI 32.16 kg/m    Body mass index is 32.16 kg/m .  Physical Exam  Constitutional:       Appearance: Normal appearance. He is not ill-appearing.   HENT:      Right Ear: Tympanic membrane, ear canal and external ear normal.      Left Ear: Tympanic membrane, ear canal and external ear normal.      Nose: Nose normal.      Mouth/Throat:      Mouth: Mucous membranes are moist.      Pharynx: Oropharynx is clear. No oropharyngeal exudate.   Eyes:      Extraocular Movements: Extraocular movements intact.      Pupils: Pupils are equal, round, and reactive to light.   Cardiovascular:      Rate and Rhythm: Normal rate and regular rhythm.      Pulses: Normal pulses.      Heart sounds: Normal heart sounds. No murmur heard.     No friction rub. No gallop.   Pulmonary:      Effort: Pulmonary effort is normal.      Breath sounds: Normal breath sounds. No wheezing, rhonchi or rales.   Abdominal:      General: Abdomen is flat. Bowel sounds are normal. There is no distension.      Palpations: Abdomen is soft. There is no hepatomegaly, mass or pulsatile mass.      Tenderness: There is no abdominal tenderness. There is no guarding or rebound.      Hernia: No hernia is present. "      Comments: Diastasis recti    Musculoskeletal:         General: Normal range of motion.      Cervical back: Normal range of motion and neck supple.   Lymphadenopathy:      Cervical: No cervical adenopathy.   Skin:     General: Skin is warm and dry.   Neurological:      General: No focal deficit present.      Mental Status: He is alert and oriented to person, place, and time.   Psychiatric:         Mood and Affect: Mood normal.         Behavior: Behavior normal.         Thought Content: Thought content normal.         Judgment: Judgment normal.            Lab on 11/09/2023   Component Date Value Ref Range Status    TSH 11/09/2023 5.23 (H)  0.30 - 4.20 uIU/mL Final    Free T4 11/09/2023 1.07  0.90 - 1.70 ng/dL Final

## 2024-01-30 DIAGNOSIS — I10 PRIMARY HYPERTENSION: ICD-10-CM

## 2024-01-30 RX ORDER — LOSARTAN POTASSIUM 25 MG/1
25 TABLET ORAL DAILY
Qty: 30 TABLET | Refills: 1 | Status: SHIPPED | OUTPATIENT
Start: 2024-01-30 | End: 2024-03-28

## 2024-03-28 DIAGNOSIS — I10 PRIMARY HYPERTENSION: ICD-10-CM

## 2024-03-28 RX ORDER — LOSARTAN POTASSIUM 25 MG/1
25 TABLET ORAL DAILY
Qty: 90 TABLET | Refills: 1 | Status: SHIPPED | OUTPATIENT
Start: 2024-03-28 | End: 2024-10-03

## 2024-08-19 NOTE — TELEPHONE ENCOUNTER
Caller: Miguelangel Graham    Procedure: colonoscopy     Date, Location, and Surgeon of Procedure Cancelled: 1/18/2022, [facility]    Ordering Provider:Gregory Horowitz MD    Reason for cancel (please be detailed, any staff messages or encounters to note?): Dr. Mathis needed to change the length of his procedures, which shifted the cases.         Rescheduled: YeS     If rescheduled:    Date: 1/19/2022   Location:    Note any change or update to original order/sedation: no                    Pt po challenged.

## 2024-10-03 DIAGNOSIS — I10 PRIMARY HYPERTENSION: ICD-10-CM

## 2024-10-03 RX ORDER — LOSARTAN POTASSIUM 25 MG/1
25 TABLET ORAL DAILY
Qty: 30 TABLET | Refills: 0 | Status: SHIPPED | OUTPATIENT
Start: 2024-10-03 | End: 2024-11-07

## 2024-10-08 NOTE — TELEPHONE ENCOUNTER
Spoke with patient.  He states understanding that he needs an appointment scheduled for next refill of his bp med.  Appointment made; next available opening was 12/6.  He will call when needs his next refill.  Cee PADILLA RN

## 2024-12-06 PROBLEM — Z86.0100 HISTORY OF COLONIC POLYPS: Status: ACTIVE | Noted: 2024-12-06

## 2024-12-09 ENCOUNTER — TELEPHONE (OUTPATIENT)
Dept: FAMILY MEDICINE | Facility: CLINIC | Age: 74
End: 2024-12-09
Payer: COMMERCIAL

## 2024-12-09 NOTE — LETTER
December 10, 2024      Miguelangel Graham  [address] [id number]        Dear ,    We are writing to inform you of your test results.    Please let patient know that his kidney function, liver enzymes and electrolytes were normal.  His blood sugar was slightly elevated.  I recommend to check a hemoglobin A1c when he comes in for the TSH level check in 3 months.  His thyroid level is slightly low, I will increase the levothyroxine dose from 25 mcg to 50 mcg daily.  The prescription was sent to the pharmacy and please take it as prescribed.  Recheck the TSH level in 3 months.  His cholesterol looked good except of the triglyceride level is quite high which is likely due to false high due to nonfasting blood sample.  Will recheck the cholesterol level in a year.  Thanks you.     If you have any questions or concerns, please call the clinic at the number listed above.       Sincerely,  Care Team

## 2024-12-09 NOTE — TELEPHONE ENCOUNTER
Yumiko Alba MD  [facility]  Please let the patient know that I increased his levothyroxine dose to 50 mcg daily, double of what he is taking.  Recheck a TSH level in 3 months.  No change in his high blood pressure medication.

## 2025-03-06 ENCOUNTER — TELEPHONE (OUTPATIENT)
Dept: FAMILY MEDICINE | Facility: CLINIC | Age: 75
End: 2025-03-06

## 2025-03-06 ENCOUNTER — LAB (OUTPATIENT)
Dept: LAB | Facility: CLINIC | Age: 75
End: 2025-03-06
Payer: COMMERCIAL

## 2025-03-06 DIAGNOSIS — I10 PRIMARY HYPERTENSION: ICD-10-CM

## 2025-03-06 DIAGNOSIS — I10 PRIMARY HYPERTENSION: Primary | ICD-10-CM

## 2025-03-06 DIAGNOSIS — E03.8 OTHER SPECIFIED HYPOTHYROIDISM: ICD-10-CM

## 2025-03-06 DIAGNOSIS — R73.9 HYPERGLYCEMIA: ICD-10-CM

## 2025-03-06 DIAGNOSIS — E11.9 TYPE 2 DIABETES MELLITUS WITHOUT COMPLICATION, WITHOUT LONG-TERM CURRENT USE OF INSULIN (H): Primary | ICD-10-CM

## 2025-03-06 LAB
CHOLEST SERPL-MCNC: 178 MG/DL
EST. AVERAGE GLUCOSE BLD GHB EST-MCNC: 140 MG/DL
HBA1C MFR BLD: 6.5 %
HDLC SERPL-MCNC: 44 MG/DL
LDLC SERPL CALC-MCNC: 104 MG/DL
NONHDLC SERPL-MCNC: 134 MG/DL
TRIGL SERPL-MCNC: 152 MG/DL
TSH SERPL DL<=0.005 MIU/L-ACNC: 3.7 UIU/ML (ref 0.3–4.2)

## 2025-03-06 NOTE — TELEPHONE ENCOUNTER
Patient has been notified.     Scheduled a lab appt on 8/6/2025 7:30 AM      Please pend future lab orders.     Kendall Jara, VF

## 2025-03-06 NOTE — TELEPHONE ENCOUNTER
----- Message from Yumiko Denton Mai sent at 3/6/2025 11:32 AM CST -----  Please let patient know that his cholesterol looked good, slightly worsened as compared to 3 months ago but overall controlled.  Hemoglobin A1c showed that he is diabetic with a hemoglobin A1c of 6.5.  I recommend him to work on exercising and healthy diet, recheck the level again in 4 to 6 months.  If he has any concerns or question about the results, please follow-up.  I am okay with virtual visit.  Yumiko

## 2025-03-06 NOTE — TELEPHONE ENCOUNTER
Lab called pt needing Lipid panel ordered.     Pended orders, please review and sign.     Sommer BELTRAN, VF

## 2025-06-30 DIAGNOSIS — E03.8 OTHER SPECIFIED HYPOTHYROIDISM: Primary | ICD-10-CM

## 2025-06-30 RX ORDER — LEVOTHYROXINE SODIUM 50 UG/1
50 TABLET ORAL DAILY
Qty: 90 TABLET | Refills: 1 | Status: SHIPPED | OUTPATIENT
Start: 2025-06-30

## (undated) DEVICE — SYR 50ML LL W/O NDL 309653

## (undated) DEVICE — DRSG STERI STRIP 1/4X3" R1541

## (undated) DEVICE — DRAPE IOBAN INCISE 36X23" 6651EZ

## (undated) DEVICE — BNDG COBAN 4"X5YDS STERILE

## (undated) DEVICE — PREP CHLORAPREP 26ML TINTED ORANGE  260815

## (undated) DEVICE — PACK OPEN SHOULDER SOP15OCFSC

## (undated) DEVICE — GLOVE PROTEXIS W/NEU-THERA 7.5  2D73TE75

## (undated) DEVICE — GLOVE EXAM NITRILE LG

## (undated) DEVICE — GLOVE PROTEXIS W/NEU-THERA 6.5  2D73TE65

## (undated) DEVICE — BUR STRK SHAVER ARTHRO 4MM BARREL 12 FLUTE 375941012

## (undated) DEVICE — SU VICRYL 2-0 CP-2 18" UND J762D

## (undated) DEVICE — DRSG ABDOMINAL 07 1/2X8" 7197D

## (undated) DEVICE — CAST PADDING 4" COTTON WEBRIL UNSTERILE 9084

## (undated) DEVICE — SU ETHILON 3-0 PS-2 18" 1669H

## (undated) DEVICE — SU MONOCRYL 4-0 PS-2 18" UND Y496G

## (undated) DEVICE — NDL ECLIPSE 18GA 1.5"

## (undated) DEVICE — DRAPE STERI TOWEL SM 1000

## (undated) DEVICE — GLOVE ESTEEM BLUE W/NEU-THERA 8.0  2D73PB80

## (undated) DEVICE — IMM KIT SHOULDER STABILIZATION 7210573

## (undated) DEVICE — LUBRICATING JELLY 4.25OZ

## (undated) DEVICE — KIT ENDO TURNOVER/PROCEDURE CARRY-ON 101822

## (undated) DEVICE — DRAPE U SPLIT 74X120" 29440

## (undated) DEVICE — DRAPE MAYO STAND 23X54 8337

## (undated) DEVICE — NDL 19GA 1.5" FILTER 305200

## (undated) DEVICE — ARTHROSCOPIC CANNULA CLEAR-TRAC 8.0X72MM 72200425

## (undated) DEVICE — ESU ARTHROWAND 90DEG RF AMBIENT SUPER TURBOVAC ASHA4250-01

## (undated) DEVICE — IMM KIT SHOULDER TMAX MASK FACE 7210559

## (undated) DEVICE — BLADE KNIFE SURG 11 371111

## (undated) DEVICE — TAPE MEDIFIX 4"

## (undated) DEVICE — TUBING SUCTION 12"X1/4" N612

## (undated) DEVICE — ESU GROUND PAD UNIVERSAL W/O CORD

## (undated) DEVICE — Device

## (undated) DEVICE — TUBING INFLOW CROSSFLOW 0450-000-100

## (undated) DEVICE — ARTHROSCOPIC CANNULA 5.5X70MM GRAY  9718

## (undated) DEVICE — DRAPE STERI U 1015

## (undated) DEVICE — SOL WATER IRRIG 1000ML BOTTLE 07139-09

## (undated) DEVICE — BNDG ELASTIC 4" DBL LENGTH UNSTERILE 6611-14

## (undated) DEVICE — TAPE MEDIPORE 4"X10YD 2964

## (undated) DEVICE — SOL NACL 0.9% IRRIG 3000ML BAG 07972-08

## (undated) DEVICE — DRSG XEROFORM 1X8"

## (undated) DEVICE — DRAPE CONVERTORS U-DRAPE 60X72" 8476

## (undated) DEVICE — SU NDL MAYO TROCAR MED 217003

## (undated) DEVICE — NDL SPINAL 18GA 3.5" 405184

## (undated) DEVICE — GLOVE PROTEXIS BLUE W/NEU-THERA 6.5  2D73EB65

## (undated) DEVICE — GLOVE PROTEXIS BLUE W/NEU-THERA 8.0  2D73EB80

## (undated) DEVICE — BLADE SAW SAGITTAL 25.5X9.5X.4MM FINE LINVATEC 5023-138

## (undated) RX ORDER — KETAMINE HCL IN 0.9 % NACL 20 MG/2 ML
SYRINGE (ML) INTRAVENOUS
Status: DISPENSED
Start: 2019-10-31

## (undated) RX ORDER — LIDOCAINE HYDROCHLORIDE 10 MG/ML
INJECTION, SOLUTION INFILTRATION; PERINEURAL
Status: DISPENSED
Start: 2017-12-12

## (undated) RX ORDER — EPINEPHRINE 1 MG/ML
INJECTION, SOLUTION, CONCENTRATE INTRAVENOUS
Status: DISPENSED
Start: 2017-12-12

## (undated) RX ORDER — ACETAMINOPHEN 10 MG/ML
INJECTION, SOLUTION INTRAVENOUS
Status: DISPENSED
Start: 2017-12-12

## (undated) RX ORDER — DEXAMETHASONE SODIUM PHOSPHATE 10 MG/ML
INJECTION INTRAMUSCULAR; INTRAVENOUS
Status: DISPENSED
Start: 2017-12-12

## (undated) RX ORDER — PROPOFOL 10 MG/ML
INJECTION, EMULSION INTRAVENOUS
Status: DISPENSED
Start: 2017-12-12

## (undated) RX ORDER — EPHEDRINE SULFATE 50 MG/ML
INJECTION, SOLUTION INTRAMUSCULAR; INTRAVENOUS; SUBCUTANEOUS
Status: DISPENSED
Start: 2017-12-12

## (undated) RX ORDER — KETAMINE HCL IN 0.9 % NACL 20 MG/2 ML
SYRINGE (ML) INTRAVENOUS
Status: DISPENSED
Start: 2019-10-17

## (undated) RX ORDER — LIDOCAINE HYDROCHLORIDE 20 MG/ML
INJECTION, SOLUTION EPIDURAL; INFILTRATION; INTRACAUDAL; PERINEURAL
Status: DISPENSED
Start: 2022-01-19

## (undated) RX ORDER — PROPOFOL 10 MG/ML
INJECTION, EMULSION INTRAVENOUS
Status: DISPENSED
Start: 2022-01-19

## (undated) RX ORDER — ONDANSETRON 2 MG/ML
INJECTION INTRAMUSCULAR; INTRAVENOUS
Status: DISPENSED
Start: 2017-12-12

## (undated) RX ORDER — FENTANYL CITRATE 50 UG/ML
INJECTION, SOLUTION INTRAMUSCULAR; INTRAVENOUS
Status: DISPENSED
Start: 2019-10-17

## (undated) RX ORDER — LIDOCAINE HYDROCHLORIDE 20 MG/ML
INJECTION, SOLUTION EPIDURAL; INFILTRATION; INTRACAUDAL; PERINEURAL
Status: DISPENSED
Start: 2017-12-12

## (undated) RX ORDER — FENTANYL CITRATE 50 UG/ML
INJECTION, SOLUTION INTRAMUSCULAR; INTRAVENOUS
Status: DISPENSED
Start: 2017-12-12